# Patient Record
Sex: FEMALE | Race: WHITE | NOT HISPANIC OR LATINO
[De-identification: names, ages, dates, MRNs, and addresses within clinical notes are randomized per-mention and may not be internally consistent; named-entity substitution may affect disease eponyms.]

---

## 2017-06-06 ENCOUNTER — APPOINTMENT (OUTPATIENT)
Dept: FAMILY MEDICINE | Facility: CLINIC | Age: 82
End: 2017-06-06

## 2017-08-31 ENCOUNTER — APPOINTMENT (OUTPATIENT)
Dept: FAMILY MEDICINE | Facility: CLINIC | Age: 82
End: 2017-08-31
Payer: MEDICARE

## 2017-08-31 VITALS
SYSTOLIC BLOOD PRESSURE: 130 MMHG | HEIGHT: 64 IN | WEIGHT: 130.44 LBS | HEART RATE: 59 BPM | RESPIRATION RATE: 78 BRPM | DIASTOLIC BLOOD PRESSURE: 58 MMHG | OXYGEN SATURATION: 98 % | BODY MASS INDEX: 22.27 KG/M2

## 2017-08-31 DIAGNOSIS — G56.32 LESION OF RADIAL NERVE, LEFT UPPER LIMB: ICD-10-CM

## 2017-08-31 DIAGNOSIS — Z87.440 PERSONAL HISTORY OF URINARY (TRACT) INFECTIONS: ICD-10-CM

## 2017-08-31 PROCEDURE — 99214 OFFICE O/P EST MOD 30 MIN: CPT

## 2017-09-06 ENCOUNTER — OTHER (OUTPATIENT)
Age: 82
End: 2017-09-06

## 2017-09-07 ENCOUNTER — OTHER (OUTPATIENT)
Age: 82
End: 2017-09-07

## 2017-09-25 ENCOUNTER — OTHER (OUTPATIENT)
Age: 82
End: 2017-09-25

## 2017-09-28 ENCOUNTER — OTHER (OUTPATIENT)
Age: 82
End: 2017-09-28

## 2017-11-29 ENCOUNTER — OTHER (OUTPATIENT)
Age: 82
End: 2017-11-29

## 2017-11-30 ENCOUNTER — APPOINTMENT (OUTPATIENT)
Dept: FAMILY MEDICINE | Facility: CLINIC | Age: 82
End: 2017-11-30

## 2017-12-04 ENCOUNTER — APPOINTMENT (OUTPATIENT)
Dept: FAMILY MEDICINE | Facility: CLINIC | Age: 82
End: 2017-12-04

## 2017-12-05 ENCOUNTER — OTHER (OUTPATIENT)
Age: 82
End: 2017-12-05

## 2018-03-29 ENCOUNTER — APPOINTMENT (OUTPATIENT)
Dept: FAMILY MEDICINE | Facility: CLINIC | Age: 83
End: 2018-03-29
Payer: MEDICARE

## 2018-03-29 VITALS
HEIGHT: 64 IN | DIASTOLIC BLOOD PRESSURE: 74 MMHG | SYSTOLIC BLOOD PRESSURE: 122 MMHG | HEART RATE: 56 BPM | OXYGEN SATURATION: 91 % | RESPIRATION RATE: 12 BRPM | WEIGHT: 127.13 LBS | BODY MASS INDEX: 21.71 KG/M2

## 2018-03-29 DIAGNOSIS — M25.519 PAIN IN UNSPECIFIED SHOULDER: ICD-10-CM

## 2018-03-29 DIAGNOSIS — H18.423 BAND KERATOPATHY, BILATERAL: ICD-10-CM

## 2018-03-29 PROCEDURE — 99214 OFFICE O/P EST MOD 30 MIN: CPT

## 2018-03-29 RX ORDER — MELOXICAM 7.5 MG/1
7.5 TABLET ORAL DAILY
Qty: 14 | Refills: 0 | Status: DISCONTINUED | COMMUNITY
Start: 2017-08-31 | End: 2018-03-29

## 2018-03-31 PROBLEM — M25.519 SHOULDER PAIN: Status: ACTIVE | Noted: 2018-03-29

## 2018-07-11 ENCOUNTER — OUTPATIENT (OUTPATIENT)
Dept: OUTPATIENT SERVICES | Facility: HOSPITAL | Age: 83
LOS: 1 days | Discharge: HOME | End: 2018-07-11
Payer: MEDICARE

## 2018-07-11 DIAGNOSIS — S91.309A UNSPECIFIED OPEN WOUND, UNSPECIFIED FOOT, INITIAL ENCOUNTER: ICD-10-CM

## 2018-07-11 PROCEDURE — 87070 CULTURE OTHR SPECIMN AEROBIC: CPT

## 2018-07-11 PROCEDURE — 99204 OFFICE O/P NEW MOD 45 MIN: CPT

## 2018-07-11 PROCEDURE — 87186 SC STD MICRODIL/AGAR DIL: CPT

## 2018-07-11 PROCEDURE — G0463: CPT

## 2018-07-12 ENCOUNTER — APPOINTMENT (OUTPATIENT)
Dept: FAMILY MEDICINE | Facility: CLINIC | Age: 83
End: 2018-07-12

## 2018-07-13 ENCOUNTER — APPOINTMENT (OUTPATIENT)
Dept: FAMILY MEDICINE | Facility: CLINIC | Age: 83
End: 2018-07-13

## 2018-07-13 DIAGNOSIS — M12.9 ARTHROPATHY, UNSPECIFIED: ICD-10-CM

## 2018-07-13 DIAGNOSIS — I25.10 ATHEROSCLEROTIC HEART DISEASE OF NATIVE CORONARY ARTERY WITHOUT ANGINA PECTORIS: ICD-10-CM

## 2018-07-13 DIAGNOSIS — I83.12 VARICOSE VEINS OF LEFT LOWER EXTREMITY WITH INFLAMMATION: ICD-10-CM

## 2018-07-13 DIAGNOSIS — Z79.82 LONG TERM (CURRENT) USE OF ASPIRIN: ICD-10-CM

## 2018-07-13 DIAGNOSIS — Z96.641 PRESENCE OF RIGHT ARTIFICIAL HIP JOINT: ICD-10-CM

## 2018-07-13 DIAGNOSIS — I10 ESSENTIAL (PRIMARY) HYPERTENSION: ICD-10-CM

## 2018-07-13 DIAGNOSIS — L03.116 CELLULITIS OF LEFT LOWER LIMB: ICD-10-CM

## 2018-07-13 DIAGNOSIS — Z79.899 OTHER LONG TERM (CURRENT) DRUG THERAPY: ICD-10-CM

## 2018-07-13 DIAGNOSIS — Z95.3 PRESENCE OF XENOGENIC HEART VALVE: ICD-10-CM

## 2018-07-13 DIAGNOSIS — S81.812D LACERATION WITHOUT FOREIGN BODY, LEFT LOWER LEG, SUBSEQUENT ENCOUNTER: ICD-10-CM

## 2018-07-13 LAB
-  AMPICILLIN/SULBACTAM: SIGNIFICANT CHANGE UP
-  CEFAZOLIN: SIGNIFICANT CHANGE UP
-  CLINDAMYCIN: SIGNIFICANT CHANGE UP
-  DAPTOMYCIN: SIGNIFICANT CHANGE UP
-  ERYTHROMYCIN: SIGNIFICANT CHANGE UP
-  GENTAMICIN: SIGNIFICANT CHANGE UP
-  LINEZOLID: SIGNIFICANT CHANGE UP
-  OXACILLIN: SIGNIFICANT CHANGE UP
-  PENICILLIN: SIGNIFICANT CHANGE UP
-  RIFAMPIN: SIGNIFICANT CHANGE UP
-  TETRACYCLINE: SIGNIFICANT CHANGE UP
-  TRIMETHOPRIM/SULFAMETHOXAZOLE: SIGNIFICANT CHANGE UP
-  VANCOMYCIN: SIGNIFICANT CHANGE UP
CULTURE RESULTS: SIGNIFICANT CHANGE UP
METHOD TYPE: SIGNIFICANT CHANGE UP
ORGANISM # SPEC MICROSCOPIC CNT: SIGNIFICANT CHANGE UP
ORGANISM # SPEC MICROSCOPIC CNT: SIGNIFICANT CHANGE UP
SPECIMEN SOURCE: SIGNIFICANT CHANGE UP

## 2018-07-17 ENCOUNTER — OUTPATIENT (OUTPATIENT)
Dept: OUTPATIENT SERVICES | Facility: HOSPITAL | Age: 83
LOS: 1 days | Discharge: HOME | End: 2018-07-17
Payer: MEDICARE

## 2018-07-17 DIAGNOSIS — L03.116 CELLULITIS OF LEFT LOWER LIMB: ICD-10-CM

## 2018-07-17 PROCEDURE — 99214 OFFICE O/P EST MOD 30 MIN: CPT

## 2018-07-17 PROCEDURE — G0463: CPT

## 2018-07-19 DIAGNOSIS — Z79.899 OTHER LONG TERM (CURRENT) DRUG THERAPY: ICD-10-CM

## 2018-07-19 DIAGNOSIS — S81.812D LACERATION WITHOUT FOREIGN BODY, LEFT LOWER LEG, SUBSEQUENT ENCOUNTER: ICD-10-CM
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
## 2018-07-19 DIAGNOSIS — I10 ESSENTIAL (PRIMARY) HYPERTENSION: ICD-10-CM

## 2018-07-19 DIAGNOSIS — I83.12 VARICOSE VEINS OF LEFT LOWER EXTREMITY WITH INFLAMMATION: ICD-10-CM

## 2018-07-19 DIAGNOSIS — Z96.641 PRESENCE OF RIGHT ARTIFICIAL HIP JOINT: ICD-10-CM
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
## 2018-07-19 DIAGNOSIS — M12.9 ARTHROPATHY, UNSPECIFIED: ICD-10-CM

## 2018-07-19 DIAGNOSIS — I25.10 ATHEROSCLEROTIC HEART DISEASE OF NATIVE CORONARY ARTERY WITHOUT ANGINA PECTORIS: ICD-10-CM
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
## 2018-07-19 DIAGNOSIS — A49.02 METHICILLIN RESISTANT STAPHYLOCOCCUS AUREUS INFECTION, UNSPECIFIED SITE: ICD-10-CM

## 2018-07-19 DIAGNOSIS — Z95.3 PRESENCE OF XENOGENIC HEART VALVE: ICD-10-CM

## 2018-07-19 DIAGNOSIS — L03.116 CELLULITIS OF LEFT LOWER LIMB: ICD-10-CM

## 2018-07-19 DIAGNOSIS — Z79.82 LONG TERM (CURRENT) USE OF ASPIRIN: ICD-10-CM

## 2018-07-25 ENCOUNTER — OUTPATIENT (OUTPATIENT)
Dept: OUTPATIENT SERVICES | Facility: HOSPITAL | Age: 83
LOS: 1 days | Discharge: HOME | End: 2018-07-25
Payer: MEDICARE

## 2018-07-25 DIAGNOSIS — L03.116 CELLULITIS OF LEFT LOWER LIMB: ICD-10-CM

## 2018-07-25 PROCEDURE — G0463: CPT

## 2018-07-25 PROCEDURE — 99214 OFFICE O/P EST MOD 30 MIN: CPT
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
## 2018-07-26 DIAGNOSIS — Z79.82 LONG TERM (CURRENT) USE OF ASPIRIN: ICD-10-CM

## 2018-07-26 DIAGNOSIS — I10 ESSENTIAL (PRIMARY) HYPERTENSION: ICD-10-CM

## 2018-07-26 DIAGNOSIS — I83.12 VARICOSE VEINS OF LEFT LOWER EXTREMITY WITH INFLAMMATION: ICD-10-CM
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
## 2018-07-26 DIAGNOSIS — Z96.641 PRESENCE OF RIGHT ARTIFICIAL HIP JOINT: ICD-10-CM

## 2018-07-26 DIAGNOSIS — S81.812D LACERATION WITHOUT FOREIGN BODY, LEFT LOWER LEG, SUBSEQUENT ENCOUNTER: ICD-10-CM

## 2018-07-26 DIAGNOSIS — I25.10 ATHEROSCLEROTIC HEART DISEASE OF NATIVE CORONARY ARTERY WITHOUT ANGINA PECTORIS: ICD-10-CM

## 2018-07-26 DIAGNOSIS — Z95.3 PRESENCE OF XENOGENIC HEART VALVE: ICD-10-CM

## 2018-07-26 DIAGNOSIS — M12.9 ARTHROPATHY, UNSPECIFIED: ICD-10-CM

## 2018-07-26 DIAGNOSIS — A49.02 METHICILLIN RESISTANT STAPHYLOCOCCUS AUREUS INFECTION, UNSPECIFIED SITE: ICD-10-CM
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
## 2018-07-26 DIAGNOSIS — Z79.899 OTHER LONG TERM (CURRENT) DRUG THERAPY: ICD-10-CM
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
## 2018-07-26 DIAGNOSIS — L53.9 ERYTHEMATOUS CONDITION, UNSPECIFIED: ICD-10-CM

## 2018-08-03 ENCOUNTER — OUTPATIENT (OUTPATIENT)
Dept: OUTPATIENT SERVICES | Facility: HOSPITAL | Age: 83
LOS: 1 days | Discharge: HOME | End: 2018-08-03
Payer: MEDICARE

## 2018-08-03 DIAGNOSIS — L03.116 CELLULITIS OF LEFT LOWER LIMB: ICD-10-CM

## 2018-08-03 PROCEDURE — 99213 OFFICE O/P EST LOW 20 MIN: CPT

## 2018-08-03 PROCEDURE — G0463: CPT

## 2018-08-06 DIAGNOSIS — Z79.899 OTHER LONG TERM (CURRENT) DRUG THERAPY: ICD-10-CM

## 2018-08-06 DIAGNOSIS — Z79.82 LONG TERM (CURRENT) USE OF ASPIRIN: ICD-10-CM
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
## 2018-08-06 DIAGNOSIS — I25.10 ATHEROSCLEROTIC HEART DISEASE OF NATIVE CORONARY ARTERY WITHOUT ANGINA PECTORIS: ICD-10-CM

## 2018-08-06 DIAGNOSIS — Z96.641 PRESENCE OF RIGHT ARTIFICIAL HIP JOINT: ICD-10-CM

## 2018-08-06 DIAGNOSIS — S81.812D LACERATION WITHOUT FOREIGN BODY, LEFT LOWER LEG, SUBSEQUENT ENCOUNTER: ICD-10-CM

## 2018-08-06 DIAGNOSIS — M12.9 ARTHROPATHY, UNSPECIFIED: ICD-10-CM

## 2018-08-06 DIAGNOSIS — I83.91 ASYMPTOMATIC VARICOSE VEINS OF RIGHT LOWER EXTREMITY: ICD-10-CM
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
## 2018-08-06 DIAGNOSIS — A49.02 METHICILLIN RESISTANT STAPHYLOCOCCUS AUREUS INFECTION, UNSPECIFIED SITE: ICD-10-CM

## 2018-08-06 DIAGNOSIS — Z95.3 PRESENCE OF XENOGENIC HEART VALVE: ICD-10-CM
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
## 2018-08-06 DIAGNOSIS — I10 ESSENTIAL (PRIMARY) HYPERTENSION: ICD-10-CM

## 2018-08-06 DIAGNOSIS — I83.12 VARICOSE VEINS OF LEFT LOWER EXTREMITY WITH INFLAMMATION: ICD-10-CM

## 2018-08-10 ENCOUNTER — OUTPATIENT (OUTPATIENT)
Dept: OUTPATIENT SERVICES | Facility: HOSPITAL | Age: 83
LOS: 1 days | Discharge: HOME | End: 2018-08-10
Payer: MEDICARE

## 2018-08-10 DIAGNOSIS — S81.812D LACERATION WITHOUT FOREIGN BODY, LEFT LOWER LEG, SUBSEQUENT ENCOUNTER: ICD-10-CM

## 2018-08-10 PROCEDURE — G0463: CPT

## 2018-08-11 DIAGNOSIS — Z79.82 LONG TERM (CURRENT) USE OF ASPIRIN: ICD-10-CM

## 2018-08-11 DIAGNOSIS — I83.12 VARICOSE VEINS OF LEFT LOWER EXTREMITY WITH INFLAMMATION: ICD-10-CM

## 2018-08-11 DIAGNOSIS — I83.91 ASYMPTOMATIC VARICOSE VEINS OF RIGHT LOWER EXTREMITY: ICD-10-CM
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
## 2018-08-11 DIAGNOSIS — S81.812D LACERATION WITHOUT FOREIGN BODY, LEFT LOWER LEG, SUBSEQUENT ENCOUNTER: ICD-10-CM
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
## 2018-08-11 DIAGNOSIS — I10 ESSENTIAL (PRIMARY) HYPERTENSION: ICD-10-CM

## 2018-08-11 DIAGNOSIS — Z95.3 PRESENCE OF XENOGENIC HEART VALVE: ICD-10-CM

## 2018-08-11 DIAGNOSIS — A49.02 METHICILLIN RESISTANT STAPHYLOCOCCUS AUREUS INFECTION, UNSPECIFIED SITE: ICD-10-CM

## 2018-08-11 DIAGNOSIS — Z79.899 OTHER LONG TERM (CURRENT) DRUG THERAPY: ICD-10-CM

## 2018-08-11 DIAGNOSIS — M12.9 ARTHROPATHY, UNSPECIFIED: ICD-10-CM

## 2018-08-11 DIAGNOSIS — Z96.641 PRESENCE OF RIGHT ARTIFICIAL HIP JOINT: ICD-10-CM

## 2018-08-11 DIAGNOSIS — I25.10 ATHEROSCLEROTIC HEART DISEASE OF NATIVE CORONARY ARTERY WITHOUT ANGINA PECTORIS: ICD-10-CM
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
## 2018-08-17 ENCOUNTER — OUTPATIENT (OUTPATIENT)
Dept: OUTPATIENT SERVICES | Facility: HOSPITAL | Age: 83
LOS: 1 days | Discharge: HOME | End: 2018-08-17
Payer: MEDICARE

## 2018-08-17 DIAGNOSIS — S81.812D LACERATION WITHOUT FOREIGN BODY, LEFT LOWER LEG, SUBSEQUENT ENCOUNTER: ICD-10-CM

## 2018-08-17 PROCEDURE — G0463: CPT

## 2018-08-18 DIAGNOSIS — A49.02 METHICILLIN RESISTANT STAPHYLOCOCCUS AUREUS INFECTION, UNSPECIFIED SITE: ICD-10-CM
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
## 2018-08-18 DIAGNOSIS — I25.10 ATHEROSCLEROTIC HEART DISEASE OF NATIVE CORONARY ARTERY WITHOUT ANGINA PECTORIS: ICD-10-CM

## 2018-08-18 DIAGNOSIS — I83.91 ASYMPTOMATIC VARICOSE VEINS OF RIGHT LOWER EXTREMITY: ICD-10-CM

## 2018-08-18 DIAGNOSIS — S81.812D LACERATION WITHOUT FOREIGN BODY, LEFT LOWER LEG, SUBSEQUENT ENCOUNTER: ICD-10-CM

## 2018-08-18 DIAGNOSIS — Z79.899 OTHER LONG TERM (CURRENT) DRUG THERAPY: ICD-10-CM

## 2018-08-18 DIAGNOSIS — Z79.82 LONG TERM (CURRENT) USE OF ASPIRIN: ICD-10-CM

## 2018-08-18 DIAGNOSIS — I10 ESSENTIAL (PRIMARY) HYPERTENSION: ICD-10-CM
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
## 2018-08-18 DIAGNOSIS — I83.12 VARICOSE VEINS OF LEFT LOWER EXTREMITY WITH INFLAMMATION: ICD-10-CM

## 2018-08-18 DIAGNOSIS — Z95.3 PRESENCE OF XENOGENIC HEART VALVE: ICD-10-CM

## 2018-08-18 DIAGNOSIS — Z96.641 PRESENCE OF RIGHT ARTIFICIAL HIP JOINT: ICD-10-CM

## 2018-08-18 DIAGNOSIS — M12.9 ARTHROPATHY, UNSPECIFIED: ICD-10-CM

## 2018-08-28 ENCOUNTER — OUTPATIENT (OUTPATIENT)
Dept: OUTPATIENT SERVICES | Facility: HOSPITAL | Age: 83
LOS: 1 days | Discharge: HOME | End: 2018-08-28
Payer: MEDICARE

## 2018-08-28 DIAGNOSIS — S81.812D LACERATION WITHOUT FOREIGN BODY, LEFT LOWER LEG, SUBSEQUENT ENCOUNTER: ICD-10-CM

## 2018-08-28 PROCEDURE — 97602 WOUND(S) CARE NON-SELECTIVE: CPT

## 2018-08-30 DIAGNOSIS — M12.9 ARTHROPATHY, UNSPECIFIED: ICD-10-CM
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
## 2018-08-30 DIAGNOSIS — I83.91 ASYMPTOMATIC VARICOSE VEINS OF RIGHT LOWER EXTREMITY: ICD-10-CM

## 2018-08-30 DIAGNOSIS — Z95.3 PRESENCE OF XENOGENIC HEART VALVE: ICD-10-CM
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
## 2018-08-30 DIAGNOSIS — I83.12 VARICOSE VEINS OF LEFT LOWER EXTREMITY WITH INFLAMMATION: ICD-10-CM

## 2018-08-30 DIAGNOSIS — I10 ESSENTIAL (PRIMARY) HYPERTENSION: ICD-10-CM

## 2018-08-30 DIAGNOSIS — Z96.641 PRESENCE OF RIGHT ARTIFICIAL HIP JOINT: ICD-10-CM

## 2018-08-30 DIAGNOSIS — Z79.82 LONG TERM (CURRENT) USE OF ASPIRIN: ICD-10-CM
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
## 2018-08-30 DIAGNOSIS — Z79.899 OTHER LONG TERM (CURRENT) DRUG THERAPY: ICD-10-CM

## 2018-08-30 DIAGNOSIS — I25.10 ATHEROSCLEROTIC HEART DISEASE OF NATIVE CORONARY ARTERY WITHOUT ANGINA PECTORIS: ICD-10-CM

## 2018-08-30 DIAGNOSIS — S81.812D LACERATION WITHOUT FOREIGN BODY, LEFT LOWER LEG, SUBSEQUENT ENCOUNTER: ICD-10-CM

## 2018-09-04 ENCOUNTER — OUTPATIENT (OUTPATIENT)
Dept: OUTPATIENT SERVICES | Facility: HOSPITAL | Age: 83
LOS: 1 days | Discharge: HOME | End: 2018-09-04
Payer: MEDICARE

## 2018-09-04 DIAGNOSIS — S81.812D LACERATION WITHOUT FOREIGN BODY, LEFT LOWER LEG, SUBSEQUENT ENCOUNTER: ICD-10-CM

## 2018-09-04 PROCEDURE — 97602 WOUND(S) CARE NON-SELECTIVE: CPT

## 2018-09-06 DIAGNOSIS — M12.9 ARTHROPATHY, UNSPECIFIED: ICD-10-CM

## 2018-09-06 DIAGNOSIS — I25.10 ATHEROSCLEROTIC HEART DISEASE OF NATIVE CORONARY ARTERY WITHOUT ANGINA PECTORIS: ICD-10-CM
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
## 2018-09-06 DIAGNOSIS — Z79.899 OTHER LONG TERM (CURRENT) DRUG THERAPY: ICD-10-CM

## 2018-09-06 DIAGNOSIS — I83.91 ASYMPTOMATIC VARICOSE VEINS OF RIGHT LOWER EXTREMITY: ICD-10-CM

## 2018-09-06 DIAGNOSIS — I10 ESSENTIAL (PRIMARY) HYPERTENSION: ICD-10-CM
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
## 2018-09-06 DIAGNOSIS — Z96.641 PRESENCE OF RIGHT ARTIFICIAL HIP JOINT: ICD-10-CM

## 2018-09-06 DIAGNOSIS — Z79.82 LONG TERM (CURRENT) USE OF ASPIRIN: ICD-10-CM
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
## 2018-09-06 DIAGNOSIS — I83.12 VARICOSE VEINS OF LEFT LOWER EXTREMITY WITH INFLAMMATION: ICD-10-CM

## 2018-09-06 DIAGNOSIS — Z95.3 PRESENCE OF XENOGENIC HEART VALVE: ICD-10-CM

## 2018-09-06 DIAGNOSIS — S81.812D LACERATION WITHOUT FOREIGN BODY, LEFT LOWER LEG, SUBSEQUENT ENCOUNTER: ICD-10-CM

## 2018-09-10 ENCOUNTER — OTHER (OUTPATIENT)
Age: 83
End: 2018-09-10

## 2018-09-18 ENCOUNTER — OUTPATIENT (OUTPATIENT)
Dept: OUTPATIENT SERVICES | Facility: HOSPITAL | Age: 83
LOS: 1 days | Discharge: HOME | End: 2018-09-18
Payer: MEDICARE

## 2018-09-18 ENCOUNTER — APPOINTMENT (OUTPATIENT)
Dept: FAMILY MEDICINE | Facility: CLINIC | Age: 83
End: 2018-09-18

## 2018-09-18 DIAGNOSIS — S81.812D LACERATION WITHOUT FOREIGN BODY, LEFT LOWER LEG, SUBSEQUENT ENCOUNTER: ICD-10-CM

## 2018-09-18 PROCEDURE — 97602 WOUND(S) CARE NON-SELECTIVE: CPT
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
## 2018-09-19 DIAGNOSIS — S81.812D LACERATION WITHOUT FOREIGN BODY, LEFT LOWER LEG, SUBSEQUENT ENCOUNTER: ICD-10-CM

## 2018-09-19 DIAGNOSIS — I83.91 ASYMPTOMATIC VARICOSE VEINS OF RIGHT LOWER EXTREMITY: ICD-10-CM

## 2018-09-19 DIAGNOSIS — I25.10 ATHEROSCLEROTIC HEART DISEASE OF NATIVE CORONARY ARTERY WITHOUT ANGINA PECTORIS: ICD-10-CM
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
## 2018-09-19 DIAGNOSIS — Z79.899 OTHER LONG TERM (CURRENT) DRUG THERAPY: ICD-10-CM

## 2018-09-19 DIAGNOSIS — I83.12 VARICOSE VEINS OF LEFT LOWER EXTREMITY WITH INFLAMMATION: ICD-10-CM
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
## 2018-09-19 DIAGNOSIS — Z96.641 PRESENCE OF RIGHT ARTIFICIAL HIP JOINT: ICD-10-CM

## 2018-09-19 DIAGNOSIS — I10 ESSENTIAL (PRIMARY) HYPERTENSION: ICD-10-CM

## 2018-09-19 DIAGNOSIS — M12.9 ARTHROPATHY, UNSPECIFIED: ICD-10-CM
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
## 2018-09-19 DIAGNOSIS — Z95.3 PRESENCE OF XENOGENIC HEART VALVE: ICD-10-CM

## 2018-09-19 DIAGNOSIS — Z79.82 LONG TERM (CURRENT) USE OF ASPIRIN: ICD-10-CM

## 2018-09-25 ENCOUNTER — OUTPATIENT (OUTPATIENT)
Dept: OUTPATIENT SERVICES | Facility: HOSPITAL | Age: 83
LOS: 1 days | Discharge: HOME | End: 2018-09-25
Payer: MEDICARE

## 2018-09-25 DIAGNOSIS — S81.812D LACERATION WITHOUT FOREIGN BODY, LEFT LOWER LEG, SUBSEQUENT ENCOUNTER: ICD-10-CM

## 2018-09-25 PROCEDURE — G0463: CPT
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
## 2018-09-26 DIAGNOSIS — I25.10 ATHEROSCLEROTIC HEART DISEASE OF NATIVE CORONARY ARTERY WITHOUT ANGINA PECTORIS: ICD-10-CM
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
## 2018-09-26 DIAGNOSIS — I83.12 VARICOSE VEINS OF LEFT LOWER EXTREMITY WITH INFLAMMATION: ICD-10-CM
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
## 2018-09-26 DIAGNOSIS — M12.9 ARTHROPATHY, UNSPECIFIED: ICD-10-CM

## 2018-09-26 DIAGNOSIS — I83.91 ASYMPTOMATIC VARICOSE VEINS OF RIGHT LOWER EXTREMITY: ICD-10-CM

## 2018-09-26 DIAGNOSIS — Z79.899 OTHER LONG TERM (CURRENT) DRUG THERAPY: ICD-10-CM

## 2018-09-26 DIAGNOSIS — I10 ESSENTIAL (PRIMARY) HYPERTENSION: ICD-10-CM

## 2018-09-26 DIAGNOSIS — Z95.3 PRESENCE OF XENOGENIC HEART VALVE: ICD-10-CM

## 2018-09-26 DIAGNOSIS — Z79.82 LONG TERM (CURRENT) USE OF ASPIRIN: ICD-10-CM

## 2018-09-26 DIAGNOSIS — Z96.641 PRESENCE OF RIGHT ARTIFICIAL HIP JOINT: ICD-10-CM

## 2018-09-26 DIAGNOSIS — S81.812D LACERATION WITHOUT FOREIGN BODY, LEFT LOWER LEG, SUBSEQUENT ENCOUNTER: ICD-10-CM

## 2018-10-02 ENCOUNTER — APPOINTMENT (OUTPATIENT)
Dept: FAMILY MEDICINE | Facility: CLINIC | Age: 83
End: 2018-10-02
Payer: MEDICARE

## 2018-10-02 ENCOUNTER — OUTPATIENT (OUTPATIENT)
Dept: OUTPATIENT SERVICES | Facility: HOSPITAL | Age: 83
LOS: 1 days | Discharge: HOME | End: 2018-10-02
Payer: MEDICARE

## 2018-10-02 ENCOUNTER — OTHER (OUTPATIENT)
Age: 83
End: 2018-10-02

## 2018-10-02 VITALS
HEIGHT: 64 IN | SYSTOLIC BLOOD PRESSURE: 199 MMHG | BODY MASS INDEX: 20.14 KG/M2 | DIASTOLIC BLOOD PRESSURE: 62 MMHG | OXYGEN SATURATION: 96 % | WEIGHT: 118 LBS | HEART RATE: 53 BPM | RESPIRATION RATE: 12 BRPM

## 2018-10-02 DIAGNOSIS — D64.9 ANEMIA, UNSPECIFIED: ICD-10-CM

## 2018-10-02 DIAGNOSIS — Z00.00 ENCOUNTER FOR GENERAL ADULT MEDICAL EXAMINATION W/OUT ABNORMAL FINDINGS: ICD-10-CM

## 2018-10-02 DIAGNOSIS — E55.9 VITAMIN D DEFICIENCY, UNSPECIFIED: ICD-10-CM

## 2018-10-02 DIAGNOSIS — Z23 ENCOUNTER FOR IMMUNIZATION: ICD-10-CM

## 2018-10-02 DIAGNOSIS — S81.812D LACERATION WITHOUT FOREIGN BODY, LEFT LOWER LEG, SUBSEQUENT ENCOUNTER: ICD-10-CM

## 2018-10-02 PROCEDURE — G0439: CPT

## 2018-10-02 PROCEDURE — 90686 IIV4 VACC NO PRSV 0.5 ML IM: CPT

## 2018-10-02 PROCEDURE — G0463: CPT

## 2018-10-02 PROCEDURE — G0008: CPT

## 2018-10-02 RX ORDER — CEPHALEXIN 500 MG/1
500 CAPSULE ORAL
Qty: 14 | Refills: 1 | Status: DISCONTINUED | COMMUNITY
Start: 2017-09-07 | End: 2018-10-02

## 2018-10-02 NOTE — PHYSICAL EXAM
[No Acute Distress] : no acute distress [Well Nourished] : well nourished [Well Developed] : well developed [EOMI] : extraocular movements intact [No Respiratory Distress] : no respiratory distress  [Clear to Auscultation] : lungs were clear to auscultation bilaterally [No Accessory Muscle Use] : no accessory muscle use [No Edema] : there was no peripheral edema [No Joint Swelling] : no joint swelling

## 2018-10-03 DIAGNOSIS — S81.812D LACERATION WITHOUT FOREIGN BODY, LEFT LOWER LEG, SUBSEQUENT ENCOUNTER: ICD-10-CM
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
## 2018-10-03 DIAGNOSIS — Z79.82 LONG TERM (CURRENT) USE OF ASPIRIN: ICD-10-CM

## 2018-10-03 DIAGNOSIS — M12.9 ARTHROPATHY, UNSPECIFIED: ICD-10-CM
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
## 2018-10-03 DIAGNOSIS — I10 ESSENTIAL (PRIMARY) HYPERTENSION: ICD-10-CM

## 2018-10-03 DIAGNOSIS — I25.10 ATHEROSCLEROTIC HEART DISEASE OF NATIVE CORONARY ARTERY WITHOUT ANGINA PECTORIS: ICD-10-CM
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
## 2018-10-03 DIAGNOSIS — Z96.641 PRESENCE OF RIGHT ARTIFICIAL HIP JOINT: ICD-10-CM

## 2018-10-03 DIAGNOSIS — Z79.899 OTHER LONG TERM (CURRENT) DRUG THERAPY: ICD-10-CM

## 2018-10-03 DIAGNOSIS — I83.12 VARICOSE VEINS OF LEFT LOWER EXTREMITY WITH INFLAMMATION: ICD-10-CM

## 2018-10-03 DIAGNOSIS — Z95.3 PRESENCE OF XENOGENIC HEART VALVE: ICD-10-CM

## 2018-10-03 DIAGNOSIS — I83.91 ASYMPTOMATIC VARICOSE VEINS OF RIGHT LOWER EXTREMITY: ICD-10-CM

## 2018-10-04 ENCOUNTER — MED ADMIN CHARGE (OUTPATIENT)
Age: 83
End: 2018-10-04

## 2018-10-05 ENCOUNTER — INPATIENT (INPATIENT)
Facility: HOSPITAL | Age: 83
LOS: 12 days | Discharge: NURSING, REHAB OR LONG-TERM CARE | DRG: 64 | End: 2018-10-18
Attending: STUDENT IN AN ORGANIZED HEALTH CARE EDUCATION/TRAINING PROGRAM | Admitting: INTERNAL MEDICINE
Payer: MEDICARE

## 2018-10-05 VITALS
HEART RATE: 66 BPM | SYSTOLIC BLOOD PRESSURE: 212 MMHG | RESPIRATION RATE: 14 BRPM | TEMPERATURE: 97 F | DIASTOLIC BLOOD PRESSURE: 59 MMHG

## 2018-10-05 DIAGNOSIS — I63.9 CEREBRAL INFARCTION, UNSPECIFIED: ICD-10-CM

## 2018-10-05 DIAGNOSIS — I10 ESSENTIAL (PRIMARY) HYPERTENSION: ICD-10-CM

## 2018-10-05 DIAGNOSIS — Z96.641 PRESENCE OF RIGHT ARTIFICIAL HIP JOINT: Chronic | ICD-10-CM

## 2018-10-05 DIAGNOSIS — Z95.2 PRESENCE OF PROSTHETIC HEART VALVE: Chronic | ICD-10-CM

## 2018-10-05 DIAGNOSIS — R29.898 OTHER SYMPTOMS AND SIGNS INVOLVING THE MUSCULOSKELETAL SYSTEM: ICD-10-CM

## 2018-10-05 DIAGNOSIS — Z29.9 ENCOUNTER FOR PROPHYLACTIC MEASURES, UNSPECIFIED: ICD-10-CM

## 2018-10-05 DIAGNOSIS — Z98.890 OTHER SPECIFIED POSTPROCEDURAL STATES: Chronic | ICD-10-CM

## 2018-10-05 LAB
ALBUMIN SERPL ELPH-MCNC: 3.9 G/DL — SIGNIFICANT CHANGE UP (ref 3.3–5)
ALP SERPL-CCNC: 50 U/L — SIGNIFICANT CHANGE UP (ref 40–120)
ALT FLD-CCNC: 11 U/L DA — SIGNIFICANT CHANGE UP (ref 10–45)
ANION GAP SERPL CALC-SCNC: 10 MMOL/L — SIGNIFICANT CHANGE UP (ref 5–17)
APTT BLD: 23 SEC — LOW (ref 27.5–37.4)
AST SERPL-CCNC: 24 U/L — SIGNIFICANT CHANGE UP (ref 10–40)
BILIRUB SERPL-MCNC: 0.4 MG/DL — SIGNIFICANT CHANGE UP (ref 0.2–1.2)
BUN SERPL-MCNC: 43 MG/DL — HIGH (ref 7–23)
CALCIUM SERPL-MCNC: 10.1 MG/DL — SIGNIFICANT CHANGE UP (ref 8.4–10.5)
CHLORIDE SERPL-SCNC: 100 MMOL/L — SIGNIFICANT CHANGE UP (ref 96–108)
CO2 SERPL-SCNC: 26 MMOL/L — SIGNIFICANT CHANGE UP (ref 22–31)
CREAT SERPL-MCNC: 1.6 MG/DL — HIGH (ref 0.5–1.3)
GIANT PLATELETS BLD QL SMEAR: PRESENT — SIGNIFICANT CHANGE UP
GLUCOSE SERPL-MCNC: 123 MG/DL — HIGH (ref 70–99)
HCT VFR BLD CALC: 39 % — SIGNIFICANT CHANGE UP (ref 34.5–45)
HGB BLD-MCNC: 12.5 G/DL — SIGNIFICANT CHANGE UP (ref 11.5–15.5)
INR BLD: 1 RATIO — SIGNIFICANT CHANGE UP (ref 0.88–1.16)
LYMPHOCYTES # BLD AUTO: 7 % — LOW (ref 13–44)
MACROCYTES BLD QL: SLIGHT — SIGNIFICANT CHANGE UP
MCHC RBC-ENTMCNC: 32.1 GM/DL — SIGNIFICANT CHANGE UP (ref 32–36)
MCHC RBC-ENTMCNC: 32.4 PG — SIGNIFICANT CHANGE UP (ref 27–34)
MCV RBC AUTO: 101 FL — HIGH (ref 80–100)
MONOCYTES NFR BLD AUTO: 3 % — SIGNIFICANT CHANGE UP (ref 2–14)
NEUTROPHILS NFR BLD AUTO: 90 % — HIGH (ref 43–77)
PLAT MORPH BLD: NORMAL — SIGNIFICANT CHANGE UP
PLATELET # BLD AUTO: 193 K/UL — SIGNIFICANT CHANGE UP (ref 150–400)
POTASSIUM SERPL-MCNC: 3.8 MMOL/L — SIGNIFICANT CHANGE UP (ref 3.5–5.3)
POTASSIUM SERPL-SCNC: 3.8 MMOL/L — SIGNIFICANT CHANGE UP (ref 3.5–5.3)
PROT SERPL-MCNC: 9 G/DL — HIGH (ref 6–8.3)
PROTHROM AB SERPL-ACNC: 11.1 SEC — SIGNIFICANT CHANGE UP (ref 9.8–12.7)
RBC # BLD: 3.87 M/UL — SIGNIFICANT CHANGE UP (ref 3.8–5.2)
RBC # FLD: 12.5 % — SIGNIFICANT CHANGE UP (ref 10.3–14.5)
RBC BLD AUTO: ABNORMAL
SODIUM SERPL-SCNC: 136 MMOL/L — SIGNIFICANT CHANGE UP (ref 135–145)
WBC # BLD: 8.2 K/UL — SIGNIFICANT CHANGE UP (ref 3.8–10.5)
WBC # FLD AUTO: 8.2 K/UL — SIGNIFICANT CHANGE UP (ref 3.8–10.5)

## 2018-10-05 PROCEDURE — 70450 CT HEAD/BRAIN W/O DYE: CPT | Mod: 26

## 2018-10-05 PROCEDURE — 99291 CRITICAL CARE FIRST HOUR: CPT

## 2018-10-05 PROCEDURE — 71045 X-RAY EXAM CHEST 1 VIEW: CPT | Mod: 26

## 2018-10-05 PROCEDURE — 93010 ELECTROCARDIOGRAM REPORT: CPT

## 2018-10-05 RX ORDER — SIMVASTATIN 20 MG/1
40 TABLET, FILM COATED ORAL AT BEDTIME
Qty: 0 | Refills: 0 | Status: DISCONTINUED | OUTPATIENT
Start: 2018-10-05 | End: 2018-10-05

## 2018-10-05 RX ORDER — PANTOPRAZOLE SODIUM 20 MG/1
40 TABLET, DELAYED RELEASE ORAL DAILY
Qty: 0 | Refills: 0 | Status: DISCONTINUED | OUTPATIENT
Start: 2018-10-05 | End: 2018-10-18

## 2018-10-05 RX ORDER — INFLUENZA VIRUS VACCINE 15; 15; 15; 15 UG/.5ML; UG/.5ML; UG/.5ML; UG/.5ML
0.5 SUSPENSION INTRAMUSCULAR ONCE
Qty: 0 | Refills: 0 | Status: DISCONTINUED | OUTPATIENT
Start: 2018-10-05 | End: 2018-10-10

## 2018-10-05 RX ORDER — ATORVASTATIN CALCIUM 80 MG/1
20 TABLET, FILM COATED ORAL AT BEDTIME
Qty: 0 | Refills: 0 | Status: DISCONTINUED | OUTPATIENT
Start: 2018-10-05 | End: 2018-10-06

## 2018-10-05 RX ORDER — LOSARTAN POTASSIUM 100 MG/1
50 TABLET, FILM COATED ORAL EVERY 12 HOURS
Qty: 0 | Refills: 0 | Status: DISCONTINUED | OUTPATIENT
Start: 2018-10-05 | End: 2018-10-06

## 2018-10-05 RX ORDER — HEPARIN SODIUM 5000 [USP'U]/ML
5000 INJECTION INTRAVENOUS; SUBCUTANEOUS EVERY 12 HOURS
Qty: 0 | Refills: 0 | Status: DISCONTINUED | OUTPATIENT
Start: 2018-10-05 | End: 2018-10-18

## 2018-10-05 RX ORDER — NICARDIPINE HYDROCHLORIDE 30 MG/1
5 CAPSULE, EXTENDED RELEASE ORAL
Qty: 40 | Refills: 0 | Status: DISCONTINUED | OUTPATIENT
Start: 2018-10-05 | End: 2018-10-06

## 2018-10-05 RX ORDER — ASPIRIN/CALCIUM CARB/MAGNESIUM 324 MG
81 TABLET ORAL DAILY
Qty: 0 | Refills: 0 | Status: DISCONTINUED | OUTPATIENT
Start: 2018-10-05 | End: 2018-10-18

## 2018-10-05 RX ORDER — METOPROLOL TARTRATE 50 MG
50 TABLET ORAL EVERY 12 HOURS
Qty: 0 | Refills: 0 | Status: DISCONTINUED | OUTPATIENT
Start: 2018-10-05 | End: 2018-10-06

## 2018-10-05 RX ORDER — AMLODIPINE BESYLATE 2.5 MG/1
10 TABLET ORAL ONCE
Qty: 0 | Refills: 0 | Status: COMPLETED | OUTPATIENT
Start: 2018-10-05 | End: 2018-10-05

## 2018-10-05 RX ADMIN — Medication 1 PATCH: at 15:57

## 2018-10-05 RX ADMIN — Medication 50 MILLIGRAM(S): at 17:16

## 2018-10-05 RX ADMIN — LOSARTAN POTASSIUM 50 MILLIGRAM(S): 100 TABLET, FILM COATED ORAL at 17:12

## 2018-10-05 RX ADMIN — HEPARIN SODIUM 5000 UNIT(S): 5000 INJECTION INTRAVENOUS; SUBCUTANEOUS at 17:12

## 2018-10-05 RX ADMIN — NICARDIPINE HYDROCHLORIDE 25 MG/HR: 30 CAPSULE, EXTENDED RELEASE ORAL at 15:48

## 2018-10-05 RX ADMIN — Medication 81 MILLIGRAM(S): at 15:55

## 2018-10-05 RX ADMIN — PANTOPRAZOLE SODIUM 40 MILLIGRAM(S): 20 TABLET, DELAYED RELEASE ORAL at 17:13

## 2018-10-05 RX ADMIN — ATORVASTATIN CALCIUM 20 MILLIGRAM(S): 80 TABLET, FILM COATED ORAL at 21:30

## 2018-10-05 RX ADMIN — AMLODIPINE BESYLATE 10 MILLIGRAM(S): 2.5 TABLET ORAL at 13:27

## 2018-10-05 NOTE — H&P ADULT - ATTENDING COMMENTS
pt seen and examined in ED  pt alert, responsive  case d/w Dr. Ibanez who we evaluated pt bedside together  noted Left sided weakness and left hemineglect  as per family HTN chronic and poorly controlled    Assessment  Acute CVA with Left HP x  1 day, not a candidate for acute tpa  uncontrolled bp, elevated BP  VHD s/p AVR    Plan   admit to ICU  permissive HTN goal sbp <180  asa, statin  neuro f/u  echo  us carotids  supportive care

## 2018-10-05 NOTE — H&P ADULT - PROBLEM SELECTOR PLAN 1
Suspected acute R MCA CVA.  Maintain -180, will check HA1c, lipid profile.  For echocardiogram and carotid duplex, ASA and statin therapies.  Repeat CT head in AM.  PT/OT.

## 2018-10-05 NOTE — H&P ADULT - PSH
History of total hip replacement, right  2014 at [facility],  No complications  S/P AVR  2008 at [facility], Dr. Bermudez.  No complications  S/p bilateral carotid endarterectomy  2008 st [facility], no complications

## 2018-10-05 NOTE — H&P ADULT - NSHPREVIEWOFSYSTEMS_GEN_ALL_CORE
Review of Systems:   	CONSTITUTIONAL: Denies fever, weight loss, or fatigue, change in appetite   	EYES: Denies photophobia, visual disturbances  	ENT:  Denies difficulty hearing, tinnitus, vertigo  	NECK: Denies neck , neck stiffness  	RESPIRATORY: Denies cough, wheeze, hemoptysis, shortness of breath  	CARDIOVASCULAR: Denies chest pain, palpitations, irregular HR  	GASTROINTESTINAL:  Denies nausea, vomiting, abdominal pain, diarrhea, constipation, melena, BRBPR, food intolerances  	GENITOURINARY: Denies dysuria, frequency, hematuria, and incontinence  	NEUROLOGICAL: See HPI  	SKIN: Denies rashes, masses, bruising, lesions   	ENDOCRINE: Denies heat or cold intolerance; No hair loss  	MUSCULOSKELETAL: Denies history of OA or gout, joint swelling  	HEME: Denies history of DVT/PE, blood transfusion

## 2018-10-05 NOTE — H&P ADULT - NSHPLABSRESULTS_GEN_ALL_CORE
EXAM:  CT BRAIN STROKE PROTOCOL    PROCEDURE DATE:  10/05/2018    INTERPRETATION:  CT brain without contrast  History facial droop and slurred speech.  There are no prior studies for comparison  There is no acute intracranial hemorrhage, mass effect, cortical edema or   hydrocephalus. The [place] vessels are symmetric in attenuation.   There are small chronic right parietal and left cerebellar infarcts.   There is no skull fracture.  IMPRESSION:  No acute findings  JULIUS KRAFT M.D., ATTENDING RADIOLOGIST  This document has been electronically signed. Oct  5 2018 10:55AM    On review of images with neurology, there seems to be an acute infarct in the right MCA parietal territory

## 2018-10-05 NOTE — H&P ADULT - HISTORY OF PRESENT ILLNESS
89 year old female with PMH HTN and AVR presented to ED at [facility] today via EMS.  Per , patient developed left arm weakness and left facial droop at about 9am yesterday, at that time they did not seek medical intervention.  This morning patient was unsteady in bathroom and  assisted her to the floor.  Called EMS to help with transfer out of bathroom, noted to be hypertensive by EMS and brought to ED against husbands wishes.  In ED SBP's found to be greater than 200.  Denies fever, chills, chest pain, shortness of breath, abdominal pain, lightheadedness, dizziness, headache, visual changes, tremor, syncope.

## 2018-10-05 NOTE — ED PROVIDER NOTE - PROGRESS NOTE DETAILS
AWILDA Gonzalez- CT head negative for acute stroke- will admit for further neuro work up- accepted by Dr. Hampton

## 2018-10-05 NOTE — HEALTH RISK ASSESSMENT
[Good] : ~his/her~  mood as  good [None] : None [With Significant Other] : lives with significant other [] :  [Fully functional (bathing, dressing, toileting, transferring, walking, feeding)] : Fully functional (bathing, dressing, toileting, transferring, walking, feeding) [Fully functional (using the telephone, shopping, preparing meals, housekeeping, doing laundry, using] : Fully functional and needs no help or supervision to perform IADLs (using the telephone, shopping, preparing meals, housekeeping, doing laundry, using transportation, managing medications and managing finances) [Designated Healthcare Proxy] : Designated healthcare proxy [No falls in past year] : Patient reported no falls in the past year [0] : 2) Feeling down, depressed, or hopeless: Not at all (0) [Change in mental status noted] : No change in mental status noted [Language] : denies difficulty with language [Behavior] : difficulty with behavior [Learning/Retaining New Information] : denies difficulty learning/retaining new information [Handling Complex Tasks] : denies difficulty handling complex tasks [Reasoning] : denies difficulty with reasoning [Spatial Ability and Orientation] : denies difficulty with spatial ability and orientation [Sexually Active] : not sexually active [Reports changes in hearing] : Reports no changes in hearing [Reports changes in dental health] : Reports no changes in dental health [Smoke Detector] : no smoke detector [Carbon Monoxide Detector] : no carbon monoxide detector [Safety elements used in home] : no safety elements used in home [Seat Belt] : does not use seat belt [Sunscreen] : does not use sunscreen [Travel to Developing Areas] : does not  travel to developing areas [TB Exposure] : is not being exposed to tuberculosis [Caregiver Concerns] : does not have caregiver concerns [de-identified] : daughter one mile away helps her out [FreeTextEntry4] : discussed living will copy of molts available here\par daughter states her mother does have an advanced directive

## 2018-10-05 NOTE — H&P ADULT - NSHPSOCIALHISTORY_GEN_ALL_CORE
Lives in private home with  who is a retired dentist  One glass of tomato juice and vodka daily, 1 beer once or twice weekly  Denies history of ETOH use or abuse  Denies history of substance abuse

## 2018-10-05 NOTE — H&P ADULT - PROBLEM SELECTOR PLAN 2
Uncontrolled with SBP > 200.  Will start Clonidine TD with home antihypertensives, may require Cardene infusion for BP control.  Restart home antihypertensives with SBP goal 140-180

## 2018-10-05 NOTE — ED ADULT NURSE NOTE - OBJECTIVE STATEMENT
Pt presents as stroke code, pt is A&O to person and place.   States slipped and fell this morning and could not get up, as per  pt did not fall,  states that he lower wife slowly to the ground because she felt week.  states that left arm numbness to hand and fingers started yesterday, states pt's PCP was contacted yesterday.   Lives with , who called EMS today because could not help pt off the floor.   Pt with wound to left lower extremity, states from previous fall and is currently being treated by wound specialist, dressing in place, dry-normal appearing.  Pt states does not ambulate regularly due to weakness and has experienced frequent falls.   No obvious signs of injury noted, no deformity, skin intact.  Hx. 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

## 2018-10-05 NOTE — ED PROVIDER NOTE - OBJECTIVE STATEMENT
89 y/o F with h/o HTN presenting with left arm weakness and left facial droop since 9am yesterday. Lives at home. Denies blood thinner use. Denies fever, chills, chest pain, shortness of breath, abdominal pain.  states she fell on bathroom floor this am. Called EMS just to have her picked up off the floor not to be evaluated for stroke. 89 y/o F with h/o HTN presenting with left arm weakness and left facial droop since 9am yesterday. Lives at home with . Denies blood thinner use. Denies fever, chills, chest pain, shortness of breath, abdominal pain.  via phone states she fell on bathroom floor this am- no head trauma, no LOC. Called EMS just to have her picked up off the floor NOT to be evaluated for stroke.   upon arrival

## 2018-10-05 NOTE — ED PROVIDER NOTE - MEDICAL DECISION MAKING DETAILS
89 y/o F with h/o HTN presenting s/p fall this am with left sided facial droop, left arm weakness and slurred speech since yesterday 9am- Stroke protocol- CT scan head, labs, Admit for further neuro work up

## 2018-10-05 NOTE — ED ADULT NURSE REASSESSMENT NOTE - NS ED NURSE REASSESS COMMENT FT1
Patient pending admission, Tele admit on hold, consult from ICU due to HTN.   Will continue to monitor further, MD Escalante aware of pt's BP.

## 2018-10-05 NOTE — H&P ADULT - NSHPPHYSICALEXAM_GEN_ALL_CORE
T(F): 96.8 (05 Oct 2018 10:56), Max: 96.8 (05 Oct 2018 10:56)  HR: 67 (05 Oct 2018 13:25) (60 - 67)  BP: 192/61 (05 Oct 2018 13:25) (191/72 - 212/59)  RR: 16 (05 Oct 2018 13:25) (14 - 17)  SpO2: 97% RA (05 Oct 2018 13:25) (95% - 97%)    Physical Exam  Gen:  WN/WD Female resting in bed, NAD  ENT:  NC/AT, no JVD noted  Thorax:  Symmetric, no retractions  Lung:  CTA b/l  CV:  S1, S2. RRR  Abd:  Soft, NT/ND.  BS normoactive, no masses to palp  Extrem:  No C/C/E, DP/radial pulses +2  Neuro:  A&O x 3.  Left facial droop noted with left tounge deviation.  Left visual field hemianopsia. LUE strength 2/5, LLE 4/5.

## 2018-10-05 NOTE — H&P ADULT - ASSESSMENT
89 year old female with PMH HTN and AVR, developed right sided weakness yesterday and brought to ED via EMS after  unable to transfer patient out of bathroom, found to have profound systolic hypertension, suspected R MCA CVA.

## 2018-10-05 NOTE — PATIENT PROFILE ADULT. - PSH
Redundant Refill Request for Lipitor refused;    Medication Detail      Disp Refills Start End SYMONE   atorvastatin (LIPITOR) 40 MG tablet 90 tablet 4 5/11/2018  No   Sig - Route: Take 1 tablet (40 mg) by mouth At Bedtime - Oral   Class: E-Prescribe   Order: [id number]   E-Prescribing Status: Receipt confirmed by pharmacy (5/11/2018  2:21 PM CDT)   Printout Tracking   External Result Report   Pharmacy   [id number] - [place], [place] - [address]     Writer notes that this Rx request from the pharmacy was already responded to on 8/1/18 with 7/30/18 refill encounter. Additional refill request has been received at this time. Call placed to [facility] in [place]. Spoke to diane Salas. Advised him of refill encounter on 7/30/18 as well as dose change with new Rx as noted above on 5/11/18. Pharmacist states understanding. Did receive new Rx as noted above on 5/11/18. Will refill Rx as noted above at this time. Nothing further needed per this writer at this time. Writer will refuse Rx request in this encounter.    Unable to complete prescription refill per RN Medication Refill Policy. Armando Gunn 8/7/2018 4:40 PM   History of total hip replacement, right  2014 at [facility],  No complications  S/P AVR  2008 at [facility], Dr. Bermudez.  No complications  S/p bilateral carotid endarterectomy  2008 st [facility], no complications

## 2018-10-05 NOTE — ED PROVIDER NOTE - ATTENDING CONTRIBUTION TO CARE
pt with onset of L sided weakness slurred speech and facial droop 26 hours PTA.  EMS only brought to hospital since  could not get her back into bed.  Pt denies any pain at this time.    Gen: Well appearing in NAD  Head: NC/AT  Neck: trachea midline  Resp:  No distress  CV: RRR  Ext: no deformities  Neuro:  A&O There is L facial droop, LUE flacid paralysis and slurred speech  Skin:  Warm and dry as visualized    Pt with elevated BP and CVA type symptoms.  Out of window for TPA or even endovascular intervention.  WIll get CT and labs and will need admission.  Discussed care multiple times with family

## 2018-10-05 NOTE — CONSULT NOTE ADULT - ASSESSMENT
89 yo woman with acute right hemispheric MCA infarct.  There is h/o HTN, s/p bilateral carotid endarterectomy, AVR.  No TPA since infarct occurred yesterday AM. R/O left MCA/ICA disease vs a cardioembolic event, R/O A Fib.    REC:  I'd not aggressively treat the blood pressure in setting of acute stroke in someone of this age with chronic hypertension in spite of systolic of 220 in ED, continue usual antihypertensives, add ASA 81 mg, Statin, speech swallow eval, PT/OT, carotid sonogram, TTE, lipids, DVT prophylaxis, aspiration precautions, repeat CT head 10/7. 89 yo woman with acute right hemispheric MCA infarct.  There is h/o HTN, s/p bilateral carotid endarterectomy, AVR.  No TPA since infarct occurred yesterday AM. R/O right  MCA/ICA disease vs a cardioembolic event, R/O A Fib.    REC:  I'd not aggressively treat the blood pressure in setting of acute stroke in someone of this age with chronic hypertension in spite of systolic of 220 in ED, continue usual antihypertensives, add ASA 81 mg, Statin, speech swallow eval, PT/OT, carotid sonogram, TTE, lipids, DVT prophylaxis, aspiration precautions, repeat CT head 10/7.

## 2018-10-05 NOTE — PROGRESS NOTE ADULT - SUBJECTIVE AND OBJECTIVE BOX
Patient is a 89y old  Female who presents with a chief complaint of Left sided Weakness (05 Oct 2018 16:24)    PAST MEDICAL & SURGICAL HISTORY:  Hypertension, unspecified type  History of total hip replacement, right: 2014 at [facility],  No complications  S/p bilateral carotid endarterectomy: 2008 [facility], no complications  S/P AVR: 2008 at [facility], Dr. Bermudez.  No complications    AMY STEPHENSON   89y    Female    BRIEF HOSPITAL COURSE:    Review of Systems:                       All other ROS are negative.    Allergies    ACE inhibitors (Other)  hydrALAZINE (Other)    Intolerances          ICU Vital Signs Last 24 Hrs  T(C): 36.6 (05 Oct 2018 21:00), Max: 36.9 (05 Oct 2018 15:53)  T(F): 97.8 (05 Oct 2018 21:00), Max: 98.5 (05 Oct 2018 15:53)  HR: 62 (05 Oct 2018 23:00) (60 - 73)  BP: 163/64 (05 Oct 2018 23:00) (116/97 - 212/61)  BP(mean): 90 (05 Oct 2018 23:00) (72 - 104)  ABP: --  ABP(mean): --  RR: 16 (05 Oct 2018 23:00) (14 - 27)  SpO2: 97% (05 Oct 2018 23:00) (88% - 100%)    Physical Examination:    General:     HEENT:     PULM:     CVS:     ABD:     EXT:     SKIN:     Neuro:          LABS:                        12.5   8.2   )-----------( 193      ( 05 Oct 2018 10:45 )             39.0     10-05    136  |  100  |  43<H>  ----------------------------<  123<H>  3.8   |  26  |  1.60<H>    Ca    10.1      05 Oct 2018 10:45    TPro  9.0<H>  /  Alb  3.9  /  TBili  0.4  /  DBili  x   /  AST  24  /  ALT  11  /  AlkPhos  50  10-05          CAPILLARY BLOOD GLUCOSE      POCT Blood Glucose.: 125 mg/dL (05 Oct 2018 10:38)    PT/INR - ( 05 Oct 2018 10:45 )   PT: 11.1 sec;   INR: 1.00 ratio         PTT - ( 05 Oct 2018 10:45 )  PTT:23.0 sec    CULTURES:      Medications:  MEDICATIONS  (STANDING):  aspirin  chewable 81 milliGRAM(s) Oral daily  atorvastatin 20 milliGRAM(s) Oral at bedtime  cloNIDine Patch 0.1 mG/24Hr(s) 1 patch Transdermal every 7 days  heparin  Injectable 5000 Unit(s) SubCutaneous every 12 hours  influenza   Vaccine 0.5 milliLiter(s) IntraMuscular once  losartan 50 milliGRAM(s) Oral every 12 hours  metoprolol succinate ER 50 milliGRAM(s) Oral every 12 hours  niCARdipine Infusion 5 mG/Hr (25 mL/Hr) IV Continuous <Continuous>  pantoprazole    Tablet 40 milliGRAM(s) Oral daily    MEDICATIONS  (PRN):        10-05 @ 07:01  -  10-05 @ 23:52  --------------------------------------------------------  IN: 412.5 mL / OUT: 250 mL / NET: 162.5 mL        RADIOLOGY/IMAGING/ECHO  < from: CT Brain Stroke Protocol (10.05.18 @ 10:49) >  EXAM:  CT BRAIN STROKE PROTOCOL      PROCEDURE DATE:  10/05/2018        INTERPRETATION:  CT brain without contrast    History facial droop and slurred speech.    There are no prior studies for comparison    There is no acute intracranial hemorrhage, mass effect, cortical edema or   hydrocephalus. The [place] vessels are symmetric in attenuation.   There are small chronic right parietal and left cerebellar infarcts.   There is no skull fracture.    IMPRESSION:    No acute findings      < from: Xray Chest 1 View- PORTABLE-Routine (10.05.18 @ 11:13) >    CXR  clear            Assessment/Plan: Patient is a 89y old  Female who presents with a chief complaint of Left sided Weakness (05 Oct 2018 16:24)    PAST MEDICAL & SURGICAL HISTORY:  Hypertension, unspecified type  History of total hip replacement, right: 2014 at [facility],  No complications  S/p bilateral carotid endarterectomy: 2008 st [facility], no complications  S/P AVR: 2008 at [facility], Dr. Bermudez.  No complications    AMY STEPHENSON   89y    Female    BRIEF HOSPITAL COURSE:  admit with clinical CVA    Review of Systems:  No HA + L arm pain L vc cut                       All other ROS are negative.    Allergies    ACE inhibitors (Other)  hydrALAZINE (Other)    Intolerances          ICU Vital Signs Last 24 Hrs  T(C): 36.6 (05 Oct 2018 21:00), Max: 36.9 (05 Oct 2018 15:53)  T(F): 97.8 (05 Oct 2018 21:00), Max: 98.5 (05 Oct 2018 15:53)  HR: 62 (05 Oct 2018 23:00) (60 - 73)  BP: 163/64 (05 Oct 2018 23:00) (116/97 - 212/61)  BP(mean): 90 (05 Oct 2018 23:00) (72 - 104)  ABP: --  ABP(mean): --  RR: 16 (05 Oct 2018 23:00) (14 - 27)  SpO2: 97% (05 Oct 2018 23:00) (88% - 100%)    Physical Examination:    General:  alert awake     HEENT:  L VC cut    PULM: bilateral BS    CVS: s1 s2 reg     ABD: soft     EXT: no edema     SKIN: warm     Neuro:  L HP           LABS:                        12.5   8.2   )-----------( 193      ( 05 Oct 2018 10:45 )             39.0     10-05    136  |  100  |  43<H>  ----------------------------<  123<H>  3.8   |  26  |  1.60<H>    Ca    10.1      05 Oct 2018 10:45    TPro  9.0<H>  /  Alb  3.9  /  TBili  0.4  /  DBili  x   /  AST  24  /  ALT  11  /  AlkPhos  50  10-05          CAPILLARY BLOOD GLUCOSE      POCT Blood Glucose.: 125 mg/dL (05 Oct 2018 10:38)    PT/INR - ( 05 Oct 2018 10:45 )   PT: 11.1 sec;   INR: 1.00 ratio         PTT - ( 05 Oct 2018 10:45 )  PTT:23.0 sec    CULTURES:      Medications:  MEDICATIONS  (STANDING):  aspirin  chewable 81 milliGRAM(s) Oral daily  atorvastatin 20 milliGRAM(s) Oral at bedtime  cloNIDine Patch 0.1 mG/24Hr(s) 1 patch Transdermal every 7 days  heparin  Injectable 5000 Unit(s) SubCutaneous every 12 hours  influenza   Vaccine 0.5 milliLiter(s) IntraMuscular once  losartan 50 milliGRAM(s) Oral every 12 hours  metoprolol succinate ER 50 milliGRAM(s) Oral every 12 hours  niCARdipine Infusion 5 mG/Hr (25 mL/Hr) IV Continuous <Continuous>  pantoprazole    Tablet 40 milliGRAM(s) Oral daily    MEDICATIONS  (PRN):        10-05 @ 07:01  -  10-05 @ 23:52  --------------------------------------------------------  IN: 412.5 mL / OUT: 250 mL / NET: 162.5 mL        RADIOLOGY/IMAGING/ECHO  < from: CT Brain Stroke Protocol (10.05.18 @ 10:49) >  EXAM:  CT BRAIN STROKE PROTOCOL      PROCEDURE DATE:  10/05/2018        INTERPRETATION:  CT brain without contrast    History facial droop and slurred speech.    There are no prior studies for comparison    There is no acute intracranial hemorrhage, mass effect, cortical edema or   hydrocephalus. The [place] vessels are symmetric in attenuation.   There are small chronic right parietal and left cerebellar infarcts.   There is no skull fracture.    IMPRESSION:    No acute findings      < from: Xray Chest 1 View- PORTABLE-Routine (10.05.18 @ 11:13) >    CXR  clear        Assessment/Plan:      89F  hx HTN AVR carotid endarterectomies admit with clinical R MCA with L HP.  Initial CT neg by radiologist.  Outside of the TPA and catheter based therapies window.  Her BP was > 220 systolic.      Post stroke protocol  carotid doppler repeat CT.  MRI if unrevealing   Statin lipid profile.

## 2018-10-05 NOTE — ED ADULT NURSE NOTE - NSIMPLEMENTINTERV_GEN_ALL_ED
Implemented All Fall with Harm Risk Interventions:  [place] to call system. Call bell, personal items and telephone within reach. Instruct patient to call for assistance. Room bathroom lighting operational. Non-slip footwear when patient is off stretcher. Physically safe environment: no spills, clutter or unnecessary equipment. Stretcher in lowest position, wheels locked, appropriate side rails in place. Provide visual cue, wrist band, yellow gown, etc. Monitor gait and stability. Monitor for mental status changes and reorient to person, place, and time. Review medications for side effects contributing to fall risk. Reinforce activity limits and safety measures with patient and family. Provide visual clues: red socks.

## 2018-10-05 NOTE — ED PROVIDER NOTE - CRITICAL CARE PROVIDED
interpretation of diagnostic studies/consultation with other physicians/telephone consultation with the patient's family/additional history taking/direct patient care (not related to procedure)/documentation

## 2018-10-05 NOTE — CONSULT NOTE ADULT - SUBJECTIVE AND OBJECTIVE BOX
Neurology consult    AMY [id number]     Patient is a 89y old  Female who presents with a chief complaint of left hemiparesis since yesterday morning.  H/o hypertension, bilateral carotid endarterectomy and AVR replacement years ago.    HPI: patient noted to have left hemiparesis on awakening yesterday AM and  today presents for admission.  BP systolic 220 in ED and admitted to CCU.  No headache seizure confusion trauma.  No Prior h/o CVA      REVIEW OF SYSTEMS:    Constitutional: No fever, chills, fatigue, weakness  Eyes: no eye pain, visual disturbances, or discharge  ENT:  No difficulty hearing, tinnitus, vertigo; No sinus or throat pain  Neck: No pain or stiffness  Respiratory: No cough, dyspnea, wheezing   Cardiovascular: No chest pain, palpitations,   Gastrointestinal: No abdominal or epigastric pain. No nausea, vomiting  No diarrhea or constipation.   Genitourinary: No dysuria, frequency, hematuria or incontinence  Neurological: No headaches, lightheadedness, vertigo, numbness or tremors  Psychiatric: No depression, anxiety, mood swings or difficulty sleeping  Musculoskeletal: No joint pain or swelling; No muscle, back or extremity pain  Skin: No itching, burning, rashes or lesions   Lymph Nodes: No enlarged glands  Endocrine: No heat or cold intolerance; No hair loss, No h/o diabetes or thyroid dysfunction  Allergy and Immunologic: No hives or eczema    MEDICATIONS        PMH: Hypertension, unspecified type       PSH: History of total hip replacement, right  S/p bilateral carotid endarterectomy  S/P AVR  No significant past surgical history      Family history:   FAMILY HISTORY:  No pertinent family history in first degree relatives      SOCIAL HISTORY:  No history of tobacco or alcohol use     Allergies    ACE inhibitors (Swelling)  hydrALAZINE (Rash)    Intolerances            Vital Signs Last 24 Hrs  T(C): 36 (05 Oct 2018 10:56), Max: 36 (05 Oct 2018 10:56)  T(F): 96.8 (05 Oct 2018 10:56), Max: 96.8 (05 Oct 2018 10:56)  HR: 67 (05 Oct 2018 13:25) (60 - 67)  BP: 192/61 (05 Oct 2018 13:25) (191/72 - 212/59)  BP(mean): --  RR: 16 (05 Oct 2018 13:25) (14 - 17)  SpO2: 97% (05 Oct 2018 13:25) (95% - 97%)      On Neurological Examination:    Head: normocephalic Neck: supple no carotid bruits    Mental Status - Patient is alert, awake, oriented and no dysarthria no aphasia    Cranial Nerves - PERRL, right gaze preference, left VF cut, left VII paresis, tongue deviates left    Motor Exam :  left hemiparesis arm>leg at 2/5 upper limb and 3 to 4/5 lower limb    Sensory    left sided DSS extinction to touch    Reflexes:  increase left with left babinski.                                                                   RADIOLOGY  CT: My review reveals the acute right MCA predominately parietal infarct.

## 2018-10-05 NOTE — HISTORY OF PRESENT ILLNESS
[FreeTextEntry1] : here for medicare susbsequent visit [de-identified] : here weith daughter\par recentyly cardio, Dr. corey, [facility] \par had ekg which was unchanged, echo pending\par patient asymtpomtic, no chest pain, sob\par sees wound care in [place]

## 2018-10-05 NOTE — ED ADULT TRIAGE NOTE - CHIEF COMPLAINT QUOTE
arrived with [facility] from home with complaints of stroke like symptoms that started approx 9am yesterday, pt fell today and  could not get her up.

## 2018-10-06 LAB
ANION GAP SERPL CALC-SCNC: 10 MMOL/L — SIGNIFICANT CHANGE UP (ref 5–17)
BUN SERPL-MCNC: 38 MG/DL — HIGH (ref 7–23)
CALCIUM SERPL-MCNC: 9.3 MG/DL — SIGNIFICANT CHANGE UP (ref 8.4–10.5)
CHLORIDE SERPL-SCNC: 100 MMOL/L — SIGNIFICANT CHANGE UP (ref 96–108)
CHOLEST SERPL-MCNC: 185 MG/DL — SIGNIFICANT CHANGE UP (ref 10–199)
CO2 SERPL-SCNC: 25 MMOL/L — SIGNIFICANT CHANGE UP (ref 22–31)
CREAT SERPL-MCNC: 1.61 MG/DL — HIGH (ref 0.5–1.3)
GLUCOSE SERPL-MCNC: 178 MG/DL — HIGH (ref 70–99)
HBA1C BLD-MCNC: 5.7 % — HIGH (ref 4–5.6)
HCT VFR BLD CALC: 36.1 % — SIGNIFICANT CHANGE UP (ref 34.5–45)
HDLC SERPL-MCNC: 92 MG/DL — SIGNIFICANT CHANGE UP
HGB BLD-MCNC: 11.7 G/DL — SIGNIFICANT CHANGE UP (ref 11.5–15.5)
LIPID PNL WITH DIRECT LDL SERPL: 77 MG/DL — SIGNIFICANT CHANGE UP
MAGNESIUM SERPL-MCNC: 1.8 MG/DL — SIGNIFICANT CHANGE UP (ref 1.6–2.6)
MCHC RBC-ENTMCNC: 32.3 GM/DL — SIGNIFICANT CHANGE UP (ref 32–36)
MCHC RBC-ENTMCNC: 32.4 PG — SIGNIFICANT CHANGE UP (ref 27–34)
MCV RBC AUTO: 100.3 FL — HIGH (ref 80–100)
PHOSPHATE SERPL-MCNC: 3.1 MG/DL — SIGNIFICANT CHANGE UP (ref 2.5–4.5)
PLATELET # BLD AUTO: 200 K/UL — SIGNIFICANT CHANGE UP (ref 150–400)
POTASSIUM SERPL-MCNC: 3.8 MMOL/L — SIGNIFICANT CHANGE UP (ref 3.5–5.3)
POTASSIUM SERPL-SCNC: 3.8 MMOL/L — SIGNIFICANT CHANGE UP (ref 3.5–5.3)
RBC # BLD: 3.6 M/UL — LOW (ref 3.8–5.2)
RBC # FLD: 12.4 % — SIGNIFICANT CHANGE UP (ref 10.3–14.5)
SODIUM SERPL-SCNC: 135 MMOL/L — SIGNIFICANT CHANGE UP (ref 135–145)
TOTAL CHOLESTEROL/HDL RATIO MEASUREMENT: 2 RATIO — LOW (ref 3.3–7.1)
TRIGL SERPL-MCNC: 81 MG/DL — SIGNIFICANT CHANGE UP (ref 10–149)
WBC # BLD: 5.6 K/UL — SIGNIFICANT CHANGE UP (ref 3.8–10.5)
WBC # FLD AUTO: 5.6 K/UL — SIGNIFICANT CHANGE UP (ref 3.8–10.5)

## 2018-10-06 PROCEDURE — 70450 CT HEAD/BRAIN W/O DYE: CPT | Mod: 26

## 2018-10-06 RX ORDER — METOPROLOL TARTRATE 50 MG
50 TABLET ORAL EVERY 12 HOURS
Qty: 0 | Refills: 0 | Status: DISCONTINUED | OUTPATIENT
Start: 2018-10-06 | End: 2018-10-06

## 2018-10-06 RX ORDER — LOSARTAN POTASSIUM 100 MG/1
50 TABLET, FILM COATED ORAL EVERY 12 HOURS
Qty: 0 | Refills: 0 | Status: DISCONTINUED | OUTPATIENT
Start: 2018-10-06 | End: 2018-10-09

## 2018-10-06 RX ORDER — METOPROLOL TARTRATE 50 MG
50 TABLET ORAL EVERY 12 HOURS
Qty: 0 | Refills: 0 | Status: DISCONTINUED | OUTPATIENT
Start: 2018-10-06 | End: 2018-10-09

## 2018-10-06 RX ORDER — ATORVASTATIN CALCIUM 80 MG/1
40 TABLET, FILM COATED ORAL AT BEDTIME
Qty: 0 | Refills: 0 | Status: DISCONTINUED | OUTPATIENT
Start: 2018-10-06 | End: 2018-10-15

## 2018-10-06 RX ORDER — METOPROLOL TARTRATE 50 MG
2.5 TABLET ORAL ONCE
Qty: 0 | Refills: 0 | Status: COMPLETED | OUTPATIENT
Start: 2018-10-06 | End: 2018-10-06

## 2018-10-06 RX ORDER — DEXAMETHASONE 0.5 MG/5ML
4 ELIXIR ORAL ONCE
Qty: 0 | Refills: 0 | Status: COMPLETED | OUTPATIENT
Start: 2018-10-06 | End: 2018-10-06

## 2018-10-06 RX ORDER — ACETAMINOPHEN 500 MG
650 TABLET ORAL EVERY 6 HOURS
Qty: 0 | Refills: 0 | Status: DISCONTINUED | OUTPATIENT
Start: 2018-10-06 | End: 2018-10-09

## 2018-10-06 RX ORDER — DOCUSATE SODIUM 100 MG
100 CAPSULE ORAL
Qty: 0 | Refills: 0 | Status: DISCONTINUED | OUTPATIENT
Start: 2018-10-06 | End: 2018-10-11

## 2018-10-06 RX ADMIN — Medication 81 MILLIGRAM(S): at 11:30

## 2018-10-06 RX ADMIN — Medication 650 MILLIGRAM(S): at 10:08

## 2018-10-06 RX ADMIN — Medication 1 PATCH: at 11:32

## 2018-10-06 RX ADMIN — ATORVASTATIN CALCIUM 40 MILLIGRAM(S): 80 TABLET, FILM COATED ORAL at 21:33

## 2018-10-06 RX ADMIN — Medication 100 MILLIGRAM(S): at 21:35

## 2018-10-06 RX ADMIN — Medication 650 MILLIGRAM(S): at 22:33

## 2018-10-06 RX ADMIN — Medication 650 MILLIGRAM(S): at 23:30

## 2018-10-06 RX ADMIN — Medication 650 MILLIGRAM(S): at 00:40

## 2018-10-06 RX ADMIN — Medication 650 MILLIGRAM(S): at 10:38

## 2018-10-06 RX ADMIN — LOSARTAN POTASSIUM 50 MILLIGRAM(S): 100 TABLET, FILM COATED ORAL at 18:37

## 2018-10-06 RX ADMIN — Medication 50 MILLIGRAM(S): at 17:32

## 2018-10-06 RX ADMIN — Medication 4 MILLIGRAM(S): at 02:03

## 2018-10-06 RX ADMIN — Medication 50 MILLIGRAM(S): at 08:36

## 2018-10-06 RX ADMIN — HEPARIN SODIUM 5000 UNIT(S): 5000 INJECTION INTRAVENOUS; SUBCUTANEOUS at 05:57

## 2018-10-06 RX ADMIN — Medication 650 MILLIGRAM(S): at 01:00

## 2018-10-06 RX ADMIN — PANTOPRAZOLE SODIUM 40 MILLIGRAM(S): 20 TABLET, DELAYED RELEASE ORAL at 11:30

## 2018-10-06 RX ADMIN — Medication 2.5 MILLIGRAM(S): at 22:27

## 2018-10-06 RX ADMIN — LOSARTAN POTASSIUM 50 MILLIGRAM(S): 100 TABLET, FILM COATED ORAL at 08:36

## 2018-10-06 RX ADMIN — HEPARIN SODIUM 5000 UNIT(S): 5000 INJECTION INTRAVENOUS; SUBCUTANEOUS at 17:32

## 2018-10-06 NOTE — PROGRESS NOTE ADULT - SUBJECTIVE AND OBJECTIVE BOX
Follow-up Critical Care Progress Note  Chief Complaint : Other symptom or sign involving musculoskeletal system      BP improved  neuro status unchanged  protecting airway  overnight dtr bedside stated wanted steroids for h/o carpel tunel spasm, extensive discussion had that pt likely had acute cva and steroids can effect glucose as well as recovery from cva. at this time will recommend to avoid steroids until evaluated by neuro .  Pending repeat CT head this am.     Allergies :ACE inhibitors (Other)  hydrALAZINE (Other)      PAST MEDICAL & SURGICAL HISTORY:  Hypertension, unspecified type  History of total hip replacement, right: 2014 at [facility],  No complications  S/p bilateral carotid endarterectomy: 2008 [facility], no complications  S/P AVR: 2008 at [facility], Dr. Bermudez.  No complications      Medications:  MEDICATIONS  (STANDING):  aspirin  chewable 81 milliGRAM(s) Oral daily  atorvastatin 40 milliGRAM(s) Oral at bedtime  cloNIDine Patch 0.1 mG/24Hr(s) 1 patch Transdermal every 7 days  heparin  Injectable 5000 Unit(s) SubCutaneous every 12 hours  influenza   Vaccine 0.5 milliLiter(s) IntraMuscular once  losartan 50 milliGRAM(s) Oral every 12 hours  pantoprazole    Tablet 40 milliGRAM(s) Oral daily    MEDICATIONS  (PRN):  acetaminophen   Tablet .. 650 milliGRAM(s) Oral every 6 hours PRN Mild Pain (1 - 3)      LABS:                        11.7   5.6   )-----------( 200      ( 06 Oct 2018 05:30 )             36.1     10-06    135  |  100  |  38<H>  ----------------------------<  178<H>  3.8   |  25  |  1.61<H>    Ca    9.3      06 Oct 2018 05:30  Phos  3.1     10-06  Mg     1.8     10-06    TPro  9.0<H>  /  Alb  3.9  /  TBili  0.4  /  DBili  x   /  AST  24  /  ALT  11  /  AlkPhos  50  10-05            PT/INR - ( 05 Oct 2018 10:45 )   PT: 11.1 sec;   INR: 1.00 ratio         PTT - ( 05 Oct 2018 10:45 )  PTT:23.0 sec            CULTURES: (if applicable)          CAPILLARY BLOOD GLUCOSE      POCT Blood Glucose.: 125 mg/dL (05 Oct 2018 10:38)      RADIOLOGY  CXR:  < from: Xray Chest 1 View- PORTABLE-Routine (10.05.18 @ 11:13) >  Impression: No acute pulmonary disease.    < end of copied text >      CT:    Repeat CT pending  ECHO:  Pending    VITALS:  T(C): 36.6 (10-06-18 @ 03:00), Max: 36.9 (10-05-18 @ 15:53)  T(F): 97.8 (10-06-18 @ 03:00), Max: 98.5 (10-05-18 @ 15:53)  HR: 65 (10-06-18 @ 08:38) (60 - 73)  BP: 174/52 (10-06-18 @ 08:38) (116/97 - 212/61)  BP(mean): 89 (10-06-18 @ 08:38) (72 - 104)  ABP: --  ABP(mean): --  RR: 20 (10-06-18 @ 08:38) (13 - 38)  SpO2: 95% (10-06-18 @ 08:38) (88% - 100%)  CVP(mm Hg): --  CVP(cm H2O): --    Ins and Outs     10-05-18 @ 07:01  -  10-06-18 @ 07:00  --------------------------------------------------------  IN: 462.5 mL / OUT: 250 mL / NET: 212.5 mL    10-06-18 @ 07:01  -  10-06-18 @ 08:51  --------------------------------------------------------  IN: 240 mL / OUT: 0 mL / NET: 240 mL        Height (cm): 160.02 (10-05-18 @ 15:13)

## 2018-10-06 NOTE — PROGRESS NOTE ADULT - ASSESSMENT
Physical Examination:  GENERAL:               Alert, Oriented, No acute distress.    HEENT:                    Pupils equal, reactive to light does not pass midline to the left  Symmetric. No JVD, Moist MM  PULM:                     Bilateral air entry, Clear to auscultation bilaterally, no significant sputum production, No Rales, No Rhonchi, No Wheezing  CVS:                         S1, S2,  +Murmur  ABD:                        Soft, nondistended, nontender, normoactive bowel sounds,   EXT:                         No edema, nontender, No Cyanosis or Clubbing   Vascular:                Warm Extremities, Normal Capillary refill, Normal Distal Pulses  SKIN:                       Warm and well perfused, no rashes noted.   NEURO:                  Alert, oriented, interactive, Left HP with 2/5 LUE - LLE 4/5 Right side 5/5, left hemineglect  PSYC:                      Calm, + Insight.        Assessment  Acute CVA with Left HP x  1 day, not a candidate for acute tpa  uncontrolled bp, elevated BP  VHD s/p AVR    Plan   BP control keep < 180  Repeat CT head  asa, statin  Echo  US Coratids   neuro f/u  echo  us carotids  supportive care .    PMD:			Kita	                   Notified(Date): 9/5 attempt by Dr. Escalante in ED - but on vacation  Family Updated: 	DTR	     bedside                            Date: 9/6      Sedation & Analgesia:	on hold  Diet/Nutrition:		as tolerated  GI PPx:			PPI    DVT Ppx:		Heparin  	RISK                                                          Points  	[ ] Previous VTE                                           	3  	[ ] Thrombophilia                                        	2  	[ ] Lower limb paralysis                              	2   	[ ] Current Cancer                                       	2   	[ x] Immobilization > 24 hrs                        	1  	[ x] ICU/CCU stay > 24 hours                       	1  	[x ] Age > 60                                                   	1  		Total:[ 3]      Activity:		               OOB PT  Head of Bed:               35-45 deg  Glycemic Control:        Insulin    Lines:  PIV    Restraints were deemed necessary to prevent removal of life-sustaining devices [  ] YES   [x    ]  NO    Disposition: Can transfer to [facility], case d/w Dr. Bartholomew who will Accept tpt upon transfer.     Goals of Care:Full Code. Physical Examination:  GENERAL:               Alert, Oriented, No acute distress.    HEENT:                    Pupils equal, reactive to light does not pass midline to the left  Symmetric. No JVD, Moist MM  PULM:                     Bilateral air entry, Clear to auscultation bilaterally, no significant sputum production, No Rales, No Rhonchi, No Wheezing  CVS:                         S1, S2,  +Murmur  ABD:                        Soft, nondistended, nontender, normoactive bowel sounds,   EXT:                         No edema, nontender, No Cyanosis or Clubbing   Vascular:                Warm Extremities, Normal Capillary refill, Normal Distal Pulses  SKIN:                       Warm and well perfused, no rashes noted.   NEURO:                  Alert, oriented, interactive, Left HP with 2/5 LUE - LLE 4/5 Right side 5/5, left hemineglect  PSYC:                      Calm, + Insight.        Assessment  Acute CVA with Left HP x  1 day, not a candidate for acute tpa  uncontrolled bp, elevated BP  VHD s/p AVR    Plan   BP control keep < 180  Repeat CT head  asa, statin  Echo  US Coratids   neuro f/u  echo  us carotids  supportive care .    PMD:			Kita	                   Notified(Date): 10/5 attempt by Dr. Escalante in ED - but on vacation  Family Updated: 	DTR	     bedside                            Date: 10/6      Sedation & Analgesia:	on hold  Diet/Nutrition:		as tolerated  GI PPx:			PPI    DVT Ppx:		Heparin  	RISK                                                          Points  	[ ] Previous VTE                                           	3  	[ ] Thrombophilia                                        	2  	[ ] Lower limb paralysis                              	2   	[ ] Current Cancer                                       	2   	[ x] Immobilization > 24 hrs                        	1  	[ x] ICU/CCU stay > 24 hours                       	1  	[x ] Age > 60                                                   	1  		Total:[ 3]      Activity:		               OOB PT  Head of Bed:               35-45 deg  Glycemic Control:        Insulin    Lines:  PIV    Restraints were deemed necessary to prevent removal of life-sustaining devices [  ] YES   [x    ]  NO    Disposition: Can transfer to [facility], case d/w Dr. Bartholomew who will Accept tpt upon transfer.     Goals of Care:Full Code.

## 2018-10-07 DIAGNOSIS — I63.9 CEREBRAL INFARCTION, UNSPECIFIED: ICD-10-CM

## 2018-10-07 DIAGNOSIS — R73.03 PREDIABETES: ICD-10-CM

## 2018-10-07 DIAGNOSIS — I10 ESSENTIAL (PRIMARY) HYPERTENSION: ICD-10-CM

## 2018-10-07 DIAGNOSIS — N17.9 ACUTE KIDNEY FAILURE, UNSPECIFIED: ICD-10-CM

## 2018-10-07 DIAGNOSIS — E87.1 HYPO-OSMOLALITY AND HYPONATREMIA: ICD-10-CM

## 2018-10-07 DIAGNOSIS — D53.9 NUTRITIONAL ANEMIA, UNSPECIFIED: ICD-10-CM

## 2018-10-07 DIAGNOSIS — Z95.2 PRESENCE OF PROSTHETIC HEART VALVE: ICD-10-CM

## 2018-10-07 PROBLEM — Z23 ENCOUNTER FOR IMMUNIZATION: Status: ACTIVE | Noted: 2017-11-29

## 2018-10-07 LAB
ANION GAP SERPL CALC-SCNC: 9 MMOL/L — SIGNIFICANT CHANGE UP (ref 5–17)
BUN SERPL-MCNC: 36 MG/DL — HIGH (ref 7–23)
CALCIUM SERPL-MCNC: 9.3 MG/DL — SIGNIFICANT CHANGE UP (ref 8.4–10.5)
CHLORIDE SERPL-SCNC: 99 MMOL/L — SIGNIFICANT CHANGE UP (ref 96–108)
CO2 SERPL-SCNC: 25 MMOL/L — SIGNIFICANT CHANGE UP (ref 22–31)
CREAT SERPL-MCNC: 1.41 MG/DL — HIGH (ref 0.5–1.3)
GLUCOSE SERPL-MCNC: 109 MG/DL — HIGH (ref 70–99)
HCT VFR BLD CALC: 35.8 % — SIGNIFICANT CHANGE UP (ref 34.5–45)
HGB BLD-MCNC: 11.4 G/DL — LOW (ref 11.5–15.5)
MAGNESIUM SERPL-MCNC: 1.9 MG/DL — SIGNIFICANT CHANGE UP (ref 1.6–2.6)
MCHC RBC-ENTMCNC: 32 GM/DL — SIGNIFICANT CHANGE UP (ref 32–36)
MCHC RBC-ENTMCNC: 32.2 PG — SIGNIFICANT CHANGE UP (ref 27–34)
MCV RBC AUTO: 100.5 FL — HIGH (ref 80–100)
PHOSPHATE SERPL-MCNC: 3.2 MG/DL — SIGNIFICANT CHANGE UP (ref 2.5–4.5)
PLATELET # BLD AUTO: 184 K/UL — SIGNIFICANT CHANGE UP (ref 150–400)
POTASSIUM SERPL-MCNC: 3.6 MMOL/L — SIGNIFICANT CHANGE UP (ref 3.5–5.3)
POTASSIUM SERPL-SCNC: 3.6 MMOL/L — SIGNIFICANT CHANGE UP (ref 3.5–5.3)
RBC # BLD: 3.56 M/UL — LOW (ref 3.8–5.2)
RBC # FLD: 12.3 % — SIGNIFICANT CHANGE UP (ref 10.3–14.5)
SODIUM SERPL-SCNC: 133 MMOL/L — LOW (ref 135–145)
WBC # BLD: 7 K/UL — SIGNIFICANT CHANGE UP (ref 3.8–10.5)
WBC # FLD AUTO: 7 K/UL — SIGNIFICANT CHANGE UP (ref 3.8–10.5)

## 2018-10-07 PROCEDURE — 93880 EXTRACRANIAL BILAT STUDY: CPT | Mod: 26

## 2018-10-07 PROCEDURE — 99222 1ST HOSP IP/OBS MODERATE 55: CPT

## 2018-10-07 PROCEDURE — 93306 TTE W/DOPPLER COMPLETE: CPT | Mod: 26

## 2018-10-07 PROCEDURE — 99233 SBSQ HOSP IP/OBS HIGH 50: CPT

## 2018-10-07 RX ORDER — NIFEDIPINE 30 MG
30 TABLET, EXTENDED RELEASE 24 HR ORAL ONCE
Qty: 0 | Refills: 0 | Status: COMPLETED | OUTPATIENT
Start: 2018-10-07 | End: 2018-10-07

## 2018-10-07 RX ADMIN — PANTOPRAZOLE SODIUM 40 MILLIGRAM(S): 20 TABLET, DELAYED RELEASE ORAL at 11:13

## 2018-10-07 RX ADMIN — LOSARTAN POTASSIUM 50 MILLIGRAM(S): 100 TABLET, FILM COATED ORAL at 05:49

## 2018-10-07 RX ADMIN — Medication 100 MILLIGRAM(S): at 05:48

## 2018-10-07 RX ADMIN — HEPARIN SODIUM 5000 UNIT(S): 5000 INJECTION INTRAVENOUS; SUBCUTANEOUS at 05:48

## 2018-10-07 RX ADMIN — HEPARIN SODIUM 5000 UNIT(S): 5000 INJECTION INTRAVENOUS; SUBCUTANEOUS at 17:32

## 2018-10-07 RX ADMIN — ATORVASTATIN CALCIUM 40 MILLIGRAM(S): 80 TABLET, FILM COATED ORAL at 21:19

## 2018-10-07 RX ADMIN — Medication 100 MILLIGRAM(S): at 17:33

## 2018-10-07 RX ADMIN — Medication 30 MILLIGRAM(S): at 00:59

## 2018-10-07 RX ADMIN — Medication 50 MILLIGRAM(S): at 17:33

## 2018-10-07 RX ADMIN — LOSARTAN POTASSIUM 50 MILLIGRAM(S): 100 TABLET, FILM COATED ORAL at 17:33

## 2018-10-07 RX ADMIN — Medication 81 MILLIGRAM(S): at 11:13

## 2018-10-07 RX ADMIN — Medication 50 MILLIGRAM(S): at 05:48

## 2018-10-07 NOTE — PROGRESS NOTE ADULT - SUBJECTIVE AND OBJECTIVE BOX
Follow-up Critical Care Progress Note  Chief Complaint : Other symptom or sign involving musculoskeletal system    patient transffered out of ICU yesterday  bp now 180s systolic, but overnight episodes of HTN noted  pt comfortable no acute changes  d/w DTR bedside  d/w Dr. Ibanez this am  D/w Dr. Mishra this am.      Allergies :ACE inhibitors (Other)  hydrALAZINE (Other)      PAST MEDICAL & SURGICAL HISTORY:  Hypertension, unspecified type  History of total hip replacement, right: 2014 at [facility],  No complications  S/p bilateral carotid endarterectomy: 2008 [facility], no complications  S/P AVR: 2008 at [facility], Dr. Bermudez.  No complications      Medications:  MEDICATIONS  (STANDING):  aspirin  chewable 81 milliGRAM(s) Oral daily  atorvastatin 40 milliGRAM(s) Oral at bedtime  cloNIDine Patch 0.1 mG/24Hr(s) 1 patch Transdermal every 7 days  docusate sodium 100 milliGRAM(s) Oral two times a day  heparin  Injectable 5000 Unit(s) SubCutaneous every 12 hours  influenza   Vaccine 0.5 milliLiter(s) IntraMuscular once  losartan 50 milliGRAM(s) Oral every 12 hours  metoprolol succinate ER 50 milliGRAM(s) Oral every 12 hours  pantoprazole    Tablet 40 milliGRAM(s) Oral daily    MEDICATIONS  (PRN):  acetaminophen   Tablet .. 650 milliGRAM(s) Oral every 6 hours PRN Mild Pain (1 - 3)      LABS:                        11.4   7.0   )-----------( 184      ( 07 Oct 2018 05:32 )             35.8     10-07    133<L>  |  99  |  36<H>  ----------------------------<  109<H>  3.6   |  25  |  1.41<H>    Ca    9.3      07 Oct 2018 05:32  Phos  3.2     10-07  Mg     1.9     10-07    TPro  9.0<H>  /  Alb  3.9  /  TBili  0.4  /  DBili  x   /  AST  24  /  ALT  11  /  AlkPhos  50  10-05            PT/INR - ( 05 Oct 2018 10:45 )   PT: 11.1 sec;   INR: 1.00 ratio         PTT - ( 05 Oct 2018 10:45 )  PTT:23.0 sec        RADIOLOGY  CXR:      CT:  < from: CT Head No Cont (10.06.18 @ 11:04) >  IMPRESSION: Evolving acute/subacute infarcts within the high right paramedian parietal and right occipital lobes. No hemorrhagic transformation.    < end of copied text >    ECHO:  Pending         VITALS:  T(C): 36.4 (10-07-18 @ 10:08), Max: 36.6 (10-06-18 @ 19:30)  T(F): 97.5 (10-07-18 @ 10:08), Max: 97.8 (10-06-18 @ 19:30)  HR: 55 (10-07-18 @ 10:08) (55 - 69)  BP: 180/53 (10-07-18 @ 10:08) (180/53 - 204/67)  BP(mean): --  ABP: --  ABP(mean): --  RR: 18 (10-07-18 @ 10:08) (15 - 18)  SpO2: 94% (10-07-18 @ 10:08) (94% - 98%)  CVP(mm Hg): --  CVP(cm H2O): --    Ins and Outs     10-06-18 @ 07:01  -  10-07-18 @ 07:00  --------------------------------------------------------  IN: 440 mL / OUT: 0 mL / NET: 440 mL        Height (cm): 160.02 (10-05-18 @ 15:13)            Physical Examination:  GENERAL:               Alert, Oriented, No acute distress.    HEENT:                    Pupils equal, reactive to light.  Symmetric. No JVD, Moist MM  PULM:                     Bilateral air entry, Clear to auscultation bilaterally, no significant sputum production, No Rales, No Rhonchi, No Wheezing  CVS:                         S1, S2,  No Murmur  ABD:                        Soft, nondistended, nontender, normoactive bowel sounds,   EXT:                         No edema, nontender, No Cyanosis or Clubbing   Vascular:                Warm Extremities, Normal Capillary refill, Normal Distal Pulses  SKIN:                       Warm and well perfused, no rashes noted.   NEURO:                  Alert, oriented, interactive, nonfocal, follows commands  PSYC:                      Calm, + Insight. Follow-up Critical Care Progress Note  Chief Complaint : Other symptom or sign involving musculoskeletal system    patient transffered out of ICU yesterday  bp now 180s systolic, but overnight episodes of HTN noted  pt comfortable no acute changes  d/w DTR bedside  d/w Dr. Ibanez this am  D/w Dr. Mishra this am.      Allergies :ACE inhibitors (Other)  hydrALAZINE (Other)      PAST MEDICAL & SURGICAL HISTORY:  Hypertension, unspecified type  History of total hip replacement, right: 2014 at [facility],  No complications  S/p bilateral carotid endarterectomy: 2008 [facility], no complications  S/P AVR: 2008 at [facility], Dr. Bermudez.  No complications      Medications:  MEDICATIONS  (STANDING):  aspirin  chewable 81 milliGRAM(s) Oral daily  atorvastatin 40 milliGRAM(s) Oral at bedtime  cloNIDine Patch 0.1 mG/24Hr(s) 1 patch Transdermal every 7 days  docusate sodium 100 milliGRAM(s) Oral two times a day  heparin  Injectable 5000 Unit(s) SubCutaneous every 12 hours  influenza   Vaccine 0.5 milliLiter(s) IntraMuscular once  losartan 50 milliGRAM(s) Oral every 12 hours  metoprolol succinate ER 50 milliGRAM(s) Oral every 12 hours  pantoprazole    Tablet 40 milliGRAM(s) Oral daily    MEDICATIONS  (PRN):  acetaminophen   Tablet .. 650 milliGRAM(s) Oral every 6 hours PRN Mild Pain (1 - 3)      LABS:                        11.4   7.0   )-----------( 184      ( 07 Oct 2018 05:32 )             35.8     10-07    133<L>  |  99  |  36<H>  ----------------------------<  109<H>  3.6   |  25  |  1.41<H>    Ca    9.3      07 Oct 2018 05:32  Phos  3.2     10-07  Mg     1.9     10-07    TPro  9.0<H>  /  Alb  3.9  /  TBili  0.4  /  DBili  x   /  AST  24  /  ALT  11  /  AlkPhos  50  10-05            PT/INR - ( 05 Oct 2018 10:45 )   PT: 11.1 sec;   INR: 1.00 ratio         PTT - ( 05 Oct 2018 10:45 )  PTT:23.0 sec        RADIOLOGY  CXR:      CT:  < from: CT Head No Cont (10.06.18 @ 11:04) >  IMPRESSION: Evolving acute/subacute infarcts within the high right paramedian parietal and right occipital lobes. No hemorrhagic transformation.    < end of copied text >    ECHO:  Pending         VITALS:  T(C): 36.4 (10-07-18 @ 10:08), Max: 36.6 (10-06-18 @ 19:30)  T(F): 97.5 (10-07-18 @ 10:08), Max: 97.8 (10-06-18 @ 19:30)  HR: 55 (10-07-18 @ 10:08) (55 - 69)  BP: 180/53 (10-07-18 @ 10:08) (180/53 - 204/67)  BP(mean): --  ABP: --  ABP(mean): --  RR: 18 (10-07-18 @ 10:08) (15 - 18)  SpO2: 94% (10-07-18 @ 10:08) (94% - 98%)  CVP(mm Hg): --  CVP(cm H2O): --    Ins and Outs     10-06-18 @ 07:01  -  10-07-18 @ 07:00  --------------------------------------------------------  IN: 440 mL / OUT: 0 mL / NET: 440 mL        Height (cm): 160.02 (10-05-18 @ 15:13)

## 2018-10-07 NOTE — PHYSICAL THERAPY INITIAL EVALUATION ADULT - CRITERIA FOR SKILLED THERAPEUTIC INTERVENTIONS
functional limitations in following categories/therapy frequency/rehab potential/anticipated discharge recommendation

## 2018-10-07 NOTE — CHART NOTE - NSCHARTNOTEFT_GEN_A_CORE
Earlier tonight, BP systolic persistent >200. Daughter reports pt is sx with pain in the neck and flushed and this is her usual symptomatology for high blood pressure.   Lopressor 2.5mg IV x 1 dose given.   called by RN. Repeat pressure now with  and pt with neck pain.   Daughter at bedside, noticeably frustrated and did not appreciate my re-evaluation at bedside.   Advised that I am present to evaluate whether I should treat the pt's pain which will alleviate the BP vs Elevated BP and to reassess pt.   Pt is comfortable and denied any HA, dizziness, SOB or CP or neck pain. She admits to chronic R shoulder pain but is completely unchanged from baseline and no meds or lidocaine patch has helped in the past.   Chart reviewed - neuro consult appreciated to avoid treating HTN aggressively even in setting of . Pt with chronic severe HTN and daughter confirms that pt's BP is difficult to control at baseline and usually can be in the systolic 200s and 220s.   Daughter concerned with elevated BP as I am and wanted me to give meds to aggressively bring it back down in the systolic 140s-150 range. Advised again against aggressive BP control that would be detrimental to cerebral perfusion pressure s/p CVA.  Daughter states that pt is usually on Procardia Xl 30mg at home. will restart procardia XL 30mg x 1 dose now.   Of note, pt was given Dexamethasone 4mg IV earlier which may contribute to her elevated BP.   Daughter wants me to give pt 4mg Dilaudid for her shoulder pain, I advised against this as her pain is no different than her baseline and pt is comfortable.

## 2018-10-07 NOTE — PROGRESS NOTE ADULT - SUBJECTIVE AND OBJECTIVE BOX
Patient is a 89y old  Female who presents with a chief complaint of Weakness (07 Oct 2018 10:40)      Patient seen and examined at bedside.    ALLERGIES:  ACE inhibitors (Other)  hydrALAZINE (Other)    MEDICATIONS:  acetaminophen   Tablet .. 650 milliGRAM(s) Oral every 6 hours PRN  aspirin  chewable 81 milliGRAM(s) Oral daily  atorvastatin 40 milliGRAM(s) Oral at bedtime  cloNIDine Patch 0.1 mG/24Hr(s) 1 patch Transdermal every 7 days  docusate sodium 100 milliGRAM(s) Oral two times a day  heparin  Injectable 5000 Unit(s) SubCutaneous every 12 hours  influenza   Vaccine 0.5 milliLiter(s) IntraMuscular once  losartan 50 milliGRAM(s) Oral every 12 hours  metoprolol succinate ER 50 milliGRAM(s) Oral every 12 hours  pantoprazole    Tablet 40 milliGRAM(s) Oral daily    Vital Signs Last 24 Hrs  T(F): 97.5 (07 Oct 2018 10:08), Max: 97.8 (06 Oct 2018 19:30)  HR: 55 (07 Oct 2018 10:08) (55 - 69)  BP: 180/53 (07 Oct 2018 10:08) (180/53 - 204/67)  RR: 18 (07 Oct 2018 10:08) (15 - 18)  SpO2: 94% (07 Oct 2018 10:08) (94% - 98%)  I&O's Summary    06 Oct 2018 07:01  -  07 Oct 2018 07:00  --------------------------------------------------------  IN: 440 mL / OUT: 0 mL / NET: 440 mL        PHYSICAL EXAM:  General: NAD, A/O x 2  ENT: MMM  Neck: Supple, No JVD  Lungs: Clear to auscultation bilaterally  Cardio: RRR, S1/S2, No murmurs  Abdomen: Soft, Nontender, Nondistended; Bowel sounds present  Extremities: No cyanosis, No edema  neuro: left hemiparesis     LABS:                        11.4   7.0   )-----------( 184      ( 07 Oct 2018 05:32 )             35.8     10-07    133  |  99  |  36  ----------------------------<  109  3.6   |  25  |  1.41    Ca    9.3      07 Oct 2018 05:32  Phos  3.2     10-07  Mg     1.9     10-07    TPro  9.0  /  Alb  3.9  /  TBili  0.4  /  DBili  x   /  AST  24  /  ALT  11  /  AlkPhos  50  10-05    eGFR if Non African American: 32 mL/min/1.73M2 (10-07-18 @ 05:32)  eGFR if African American: 37 mL/min/1.73M2 (10-07-18 @ 05:32)    PT/INR - ( 05 Oct 2018 10:45 )   PT: 11.1 sec;   INR: 1.00 ratio         PTT - ( 05 Oct 2018 10:45 )  PTT:23.0 sec        10-06 Chol 185 mg/dL LDL 77 mg/dL HDL 92 mg/dL Trig 81 mg/dL        CAPILLARY BLOOD GLUCOSE        10-06 [id number] 5.7          RADIOLOGY & ADDITIONAL TESTS:    Care Discussed with Consultants/Other Providers:

## 2018-10-07 NOTE — CONSULT NOTE ADULT - PROBLEM SELECTOR RECOMMENDATION 9
D/w Dr. Ibanez, neurology consultatant.  Carotid duplex, telemetry, echo.  Statin.  Consider event recorder or ILR.  Poor candidate for SENA

## 2018-10-07 NOTE — CONSULT NOTE ADULT - SUBJECTIVE AND OBJECTIVE BOX
CHIEF COMPLAINT:  Patient is a 89y old  Female who presents with a chief complaint of Weakness (07 Oct 2018 10:58)    HPI:  89 year old female with PMH HTN and AVR presented to ED at [facility] today via EMS.  Per , patient developed left arm weakness and left facial droop at about 9am yesterday, at that time they did not seek medical intervention.  This morning patient was unsteady in bathroom and  assisted her to the floor.  Called EMS to help with transfer out of bathroom, noted to be hypertensive by EMS and brought to ED against husbands wishes.  In ED SBP's found to be greater than 200.  Denies fever, chills, chest pain, shortness of breath, abdominal pain, lightheadedness, dizziness, headache, visual changes, tremor, syncope. (05 Oct 2018 13:31)      PMH:   Hypertension, unspecified type      PSH:   History of total hip replacement, right  S/p bilateral carotid endarterectomy  S/P AVR  No significant past surgical history        FAMILY HISTORY:  No pertinent family history in first degree relatives      SOCIAL HISTORY:  Smoking:  not currently  Alcohol: social      ALLERGIES:  ACE inhibitors (Other)  hydrALAZINE (Other)      Home Medications:  Norvasc 2.5 mg oral tablet: orally every 12 hours (05 Oct 2018 14:12)  Procardia XL 30 mg oral tablet, extended release: 1 tab(s) orally once a day (05 Oct 2018 14:11)  Toprol-XL 50 mg oral tablet, extended release: orally every 12 hours (05 Oct 2018 14:11)  valsartan 160 mg oral tablet: 1 tab(s) orally every 12 hours (05 Oct 2018 14:10)      MEDICATIONS:  acetaminophen   Tablet .. 650 milliGRAM(s) Oral every 6 hours PRN  aspirin  chewable 81 milliGRAM(s) Oral daily  atorvastatin 40 milliGRAM(s) Oral at bedtime  cloNIDine Patch 0.1 mG/24Hr(s) 1 patch Transdermal every 7 days  docusate sodium 100 milliGRAM(s) Oral two times a day  heparin  Injectable 5000 Unit(s) SubCutaneous every 12 hours  influenza   Vaccine 0.5 milliLiter(s) IntraMuscular once  losartan 50 milliGRAM(s) Oral every 12 hours  metoprolol succinate ER 50 milliGRAM(s) Oral every 12 hours  pantoprazole    Tablet 40 milliGRAM(s) Oral daily      REVIEW OF SYSTEMS:  CONSTITUTIONAL: No fever, weight loss, or fatigue  EYES: No eye pain, visual disturbances, or discharge  ENMT:  No difficulty hearing, tinnitus, vertigo; No sinus or throat pain  NECK: No pain or stiffness  BREASTS: No pain, masses, or nipple discharge  RESPIRATORY: No cough, wheezing, chills or hemoptysis; No shortness of breath  CARDIOVASCULAR: No chest pain, palpitations, dizziness, or leg swelling  GASTROINTESTINAL: No abdominal or epigastric pain. No nausea, vomiting, or hematemesis; No diarrhea or constipation. No melena or hematochezia.  GENITOURINARY: No dysuria, frequency, hematuria, or incontinence  NEUROLOGICAL:Weakness, as per HPI  SKIN: No itching, burning, rashes, or lesions   LYMPH NODES: No enlarged glands  ENDOCRINE: No heat or cold intolerance; No hair loss  MUSCULOSKELETAL: No joint pain or swelling; No muscle, back, or extremity pain  PSYCHIATRIC: No depression, anxiety, mood swings, or difficulty sleeping  HEME/LYMPH: No easy bruising, or bleeding gums  ALLERGY AND IMMUNOLOGIC: No hives or eczema    PHYSICAL EXAM:  T(C): 36.4 (10-07-18 @ 10:08), Max: 36.6 (10-06-18 @ 19:30)  HR: 55 (10-07-18 @ 10:08) (55 - 69)  BP: 180/53 (10-07-18 @ 10:08) (180/53 - 204/67)  RR: 18 (10-07-18 @ 10:08) (15 - 18)  SpO2: 94% (10-07-18 @ 10:08) (94% - 98%)  Wt(kg): --    GENERAL: NAD, well-groomed, well-developed  HEAD:  Atraumatic, Normocephalic  EYES: EOMI, conjunctiva and sclera clear  ENT: Moist mucous membranes,  NECK: Supple, No JVD, no bruits Neck scars noted  CHEST/LUNG: Clear to ausculation and percussion bilaterally; No rales, rhonchi, wheezing, or rubs  HEART: Regular rate and rhythm; No murmurs, rubs, or gallops PMI non displaced.  ABDOMEN: Soft, Nontender, Nondistended; Bowel sounds present  EXTREMITIES:  Peripheral Pulses, No clubbing, cyanosis, mild ankle edema    NERVOUS SYSTEM:  Alert weakness left     Cardiovascular Diagnostic Testing:  ECG:  < from: 12 Lead ECG (10.05.18 @ 10:58) >  Ventricular Rate 66 BPM    Atrial Rate 66 BPM    P-R Interval 180 ms    QRS Duration 90 ms    Q-T Interval 452 ms    QTC Calculation(Bezet) 473 ms    P Axis 80 degrees    R Axis 57 degrees    T Axis 101 degrees    Diagnosis Line Normal sinus rhythm  LVH , nstwc  Borderline ECG  No previous ECGs available  Confirmed by PALMIRA PALM, SOTERO MEJIAS () on 10/6/2018 8:50:38 AM    < end of copied text >      LABS:                        11.4   7.0   )-----------( 184      ( 07 Oct 2018 05:32 )             35.8     10-    133<L>  |  99  |  36<H>  ----------------------------<  109<H>  3.6   |  25  |  1.41<H>    Ca    9.3      07 Oct 2018 05:32  Phos  3.2     10-07  Mg     1.9     10-07      Total Cholesterol: 185  LDL: 77  HDL: 92  T    Hemoglobin A1C, Whole Blood: 5.7 % (10-06 @ 05:30)        Telemetry:    sinus    IMAGING:    < from: CT Head No Cont (10.06.18 @ 11:04) >    EXAM:  CT BRAIN      PROCEDURE DATE:  10/06/2018        INTERPRETATION:  .    CLINICAL INFORMATION: Cerebrovascular accident.    TECHNIQUE: Multiple axial CT images of the head were obtained without   contrast. Sagittal and coronal reconstructed images were acquired from   the source data.    COMPARISON: Prior head CT examination from 10/5/2018.    FINDINGS: An evolving transcortical infarct is notable within the high   right paramedian parietal lobe. Evolving infarct is also notable within   the right occipital lobe.    There is a chronic infarct within the right posterior frontal lobe. A   chronic infarct is also noted within the left cerebellar hemisphere.   There is also an area of encephalomalacia and gliosis within the right   basal frontal lobe related to prior insult.    There is diffuse cerebral volume loss with prominence of the sulci,   fissures, and cisternal spaces which is normal for the patient's age.   There is mild periventricular white matter hypoattenuation statistically   compatible with microvascular changes given calcific atherosclerotic   disease of the intracranial arteries.    There is no acute intracranial hemorrhage, shift of the midline   structures, or hydrocephalus.    The paranasal sinuses and mastoid air cells are clear. The calvarium   appears intact. Bilateral senile scleral plaques are noted. There is   evidence of bilateral cataract removal.     IMPRESSION: Evolving acute/subacute infarcts within the high right   paramedian parietal and right occipital lobes. No hemorrhagic   transformation.    Other chronic findings, as discussed    < end of copied text >  < from: Xray Chest 1 View- PORTABLE-Routine (10.05.18 @ 11:13) >  EXAM:  XR CHEST PORTABLE ROUTINE 1V      PROCEDURE DATE:  10/05/2018        INTERPRETATION:  History: Slurred speech and left-sided weakness.   Admission chest    Technique:  AP portable    Comparisons:  none    Findings:     No acute infiltrates, congestion or pleural effusions  .      The pulmonary vasculature and aorta are normal for age. Heart size is   unremarkable.     Status post median sternotomy and aortic valve replacement. Multiple   surgical clips clips are present in the right and left neck.    Impression: No acute pulmonary disease.    < end of copied text >

## 2018-10-07 NOTE — PROGRESS NOTE ADULT - SUBJECTIVE AND OBJECTIVE BOX
Neurology Progress Note    Subjective & Objective:  Improved.  Able to pic up images in the left visual field though left neglect persists with right head and eye deviation.  Also left VII and hemiparesis persists with reduced pin and DSS errors to touch.          Vital Signs Last 24 Hrs  T(C): 36.4 (07 Oct 2018 10:08), Max: 36.6 (06 Oct 2018 19:30)  T(F): 97.5 (07 Oct 2018 10:08), Max: 97.8 (06 Oct 2018 19:30)  HR: 55 (07 Oct 2018 10:08) (55 - 69)  BP: 180/53 (07 Oct 2018 10:08) (180/53 - 204/67)  BP(mean): --  RR: 18 (07 Oct 2018 10:08) (15 - 18)  SpO2: 94% (07 Oct 2018 10:08) (94% - 98%)                          total cholesterol 185 mg/dL  HDL 92 mg/dL  LDL 77 mg/dL                 Radiology: CT< from: CT Head No Cont (10.06.18 @ 11:04) >  CLINICAL INFORMATION: Cerebrovascular accident.    TECHNIQUE: Multiple axial CT images of the head were obtained without   contrast. Sagittal and coronal reconstructed images were acquired from   the source data.    COMPARISON: Prior head CT examination from 10/5/2018.    FINDINGS: An evolving transcortical infarct is notable within the high   right paramedian parietal lobe. Evolving infarct is also notable within   the right occipital lobe.    There is a chronic infarct within the right posterior frontal lobe. A   chronic infarct is also noted within the left cerebellar hemisphere.   There is also an area of encephalomalacia and gliosis within the right   basal frontal lobe related to prior insult.    There is diffuse cerebral volume loss with prominence of the sulci,   fissures, and cisternal spaces which is normal for the patient's age.   There is mild periventricular white matter hypoattenuation statistically   compatible with microvascular changes given calcific atherosclerotic   disease of the intracranial arteries.    There is no acute intracranial hemorrhage, shift of the midline   structures, or hydrocephalus.    The paranasal sinuses and mastoid air cells are clear. The calvarium   appears intact. Bilateral senile scleral plaques are noted. There is   evidence of bilateral cataract removal.     IMPRESSION: Evolving acute/subacute infarcts within the high right   paramedian parietal and right occipital lobes. No hemorrhagic     < end of copied text >                             MEDICATIONS  (STANDING):  aspirin  chewable 81 milliGRAM(s) Oral daily  atorvastatin 40 milliGRAM(s) Oral at bedtime  cloNIDine Patch 0.1 mG/24Hr(s) 1 patch Transdermal every 7 days  docusate sodium 100 milliGRAM(s) Oral two times a day  heparin  Injectable 5000 Unit(s) SubCutaneous every 12 hours  influenza   Vaccine 0.5 milliLiter(s) IntraMuscular once  losartan 50 milliGRAM(s) Oral every 12 hours  metoprolol succinate ER 50 milliGRAM(s) Oral every 12 hours  pantoprazole    Tablet 40 milliGRAM(s) Oral daily    MEDICATIONS  (PRN):  acetaminophen   Tablet .. 650 milliGRAM(s) Oral every 6 hours PRN Mild Pain (1 - 3) Neurology Progress Note    Subjective & Objective:  Improved.  Able to pic up images in the left visual field though left neglect persists with right head and eye deviation.  Also left VII and hemiparesis persists with reduced pin and DSS errors to touch.    Repeat head CT yesterday reveals the right MCA infarct and there is also an evolving right occipital infarct as well.          Vital Signs Last 24 Hrs  T(C): 36.4 (07 Oct 2018 10:08), Max: 36.6 (06 Oct 2018 19:30)  T(F): 97.5 (07 Oct 2018 10:08), Max: 97.8 (06 Oct 2018 19:30)  HR: 55 (07 Oct 2018 10:08) (55 - 69)  BP: 180/53 (07 Oct 2018 10:08) (180/53 - 204/67)  BP(mean): --  RR: 18 (07 Oct 2018 10:08) (15 - 18)  SpO2: 94% (07 Oct 2018 10:08) (94% - 98%)                          total cholesterol 185 mg/dL  HDL 92 mg/dL  LDL 77 mg/dL                 Radiology: CT< from: CT Head No Cont (10.06.18 @ 11:04) >  CLINICAL INFORMATION: Cerebrovascular accident.    TECHNIQUE: Multiple axial CT images of the head were obtained without   contrast. Sagittal and coronal reconstructed images were acquired from   the source data.    COMPARISON: Prior head CT examination from 10/5/2018.    FINDINGS: An evolving transcortical infarct is notable within the high   right paramedian parietal lobe. Evolving infarct is also notable within   the right occipital lobe.    There is a chronic infarct within the right posterior frontal lobe. A   chronic infarct is also noted within the left cerebellar hemisphere.   There is also an area of encephalomalacia and gliosis within the right   basal frontal lobe related to prior insult.    There is diffuse cerebral volume loss with prominence of the sulci,   fissures, and cisternal spaces which is normal for the patient's age.   There is mild periventricular white matter hypoattenuation statistically   compatible with microvascular changes given calcific atherosclerotic   disease of the intracranial arteries.    There is no acute intracranial hemorrhage, shift of the midline   structures, or hydrocephalus.    The paranasal sinuses and mastoid air cells are clear. The calvarium   appears intact. Bilateral senile scleral plaques are noted. There is   evidence of bilateral cataract removal.     IMPRESSION: Evolving acute/subacute infarcts within the high right   paramedian parietal and right occipital lobes. No hemorrhagic     < end of copied text >                             MEDICATIONS  (STANDING):  aspirin  chewable 81 milliGRAM(s) Oral daily  atorvastatin 40 milliGRAM(s) Oral at bedtime  cloNIDine Patch 0.1 mG/24Hr(s) 1 patch Transdermal every 7 days  docusate sodium 100 milliGRAM(s) Oral two times a day  heparin  Injectable 5000 Unit(s) SubCutaneous every 12 hours  influenza   Vaccine 0.5 milliLiter(s) IntraMuscular once  losartan 50 milliGRAM(s) Oral every 12 hours  metoprolol succinate ER 50 milliGRAM(s) Oral every 12 hours  pantoprazole    Tablet 40 milliGRAM(s) Oral daily    MEDICATIONS  (PRN):  acetaminophen   Tablet .. 650 milliGRAM(s) Oral every 6 hours PRN Mild Pain (1 - 3)

## 2018-10-07 NOTE — PROGRESS NOTE ADULT - ASSESSMENT
S/P right hemispheric infarct with signs of right cerebral dysfunction in setting of HTN, prior carotid endarterectomy and AVR.  No evidence for A Fib.    REC:  medications as ordered, speech, PT/OT, evaluate for rehab, telemetry to r/o A Fib, check echocardiogram, medications reviewed, cautious BP control as you are doing. S/P right hemispheric infarcts in the right parietal and occipital lobes seen on the most recent CT.  Therefore emboli should be considered in this setting though no evidence for A Fib.    REC:  medications as ordered, speech, PT/OT, evaluate for rehab, telemetry to r/o A Fib, check echocardiogram cautious BP control as you are doing, consider cardiac consult to r/o cardioembolic event.

## 2018-10-07 NOTE — PROGRESS NOTE ADULT - ASSESSMENT
Physical Examination:  GENERAL:               Alert, Oriented, No acute distress.    HEENT:                    Pupils equal, reactive to light does not pass midline to the left  Symmetric. No JVD, Moist MM  PULM:                     Bilateral air entry, Clear to auscultation bilaterally, no significant sputum production, No Rales, No Rhonchi, No Wheezing  CVS:                         S1, S2,  +Murmur  ABD:                        Soft, nondistended, nontender, normoactive bowel sounds,   EXT:                         No edema, nontender, No Cyanosis or Clubbing   Vascular:                Warm Extremities, Normal Capillary refill, Normal Distal Pulses  SKIN:                       Warm and well perfused, no rashes noted.   NEURO:                  Alert, oriented, interactive, Left HP with 2/5 LUE - LLE 4/5 Right side 5/5, left hemineglect  PSYC:                      Calm, + Insight.        Assessment  Acute CVA with Left HP , not a candidate for tpa on presentation  uncontrolled bp, HTN  VHD s/p AVR    Plan   BP control keep < 180, BP control   asa, statin  Echo  US Carotids   neuro f/u  supportive care .    PMD:			Kita	                   Notified(Date): 9/5 attempt by Dr. Escalante in ED - but on vacation  Family Updated: 	DTR	     bedside                            Date: 9/7      Sedation & Analgesia:	on hold  Diet/Nutrition:		as tolerated  GI PPx:			PPI    DVT Ppx:		Heparin    Activity:		               OOB PT  Head of Bed:               35-45 deg  Glycemic Control:        Insulin    Lines:  PIV    Restraints were deemed necessary to prevent removal of life-sustaining devices [  ] YES   [x    ]  NO    Disposition: continue care on tele    Goals of Care:Full Code. Physical Examination:  GENERAL:               Alert, Oriented, No acute distress.    HEENT:                    Pupils equal, reactive to light does not pass midline to the left  Symmetric. No JVD, Moist MM  PULM:                     Bilateral air entry, Clear to auscultation bilaterally, no significant sputum production, No Rales, No Rhonchi, No Wheezing  CVS:                         S1, S2,  +Murmur  ABD:                        Soft, nondistended, nontender, normoactive bowel sounds,   EXT:                         No edema, nontender, No Cyanosis or Clubbing   Vascular:                Warm Extremities, Normal Capillary refill, Normal Distal Pulses  SKIN:                       Warm and well perfused, no rashes noted.   NEURO:                  Alert, oriented, interactive, Left HP with 2/5 LUE - LLE 4/5 Right side 5/5, left hemineglect  PSYC:                      Calm, + Insight.        Assessment  Acute CVA with Left HP , not a candidate for tpa on presentation  uncontrolled bp, HTN  VHD s/p AVR    Plan   BP control keep < 180, BP control   asa, statin  Echo  US Carotids   neuro f/u  supportive care .    PMD:			Kita	                   Notified(Date): 10/5 attempt by Dr. Escalante in ED - but on vacation  Family Updated: 	DTR	     bedside                            Date: 10/7      Sedation & Analgesia:	on hold  Diet/Nutrition:		as tolerated  GI PPx:			PPI    DVT Ppx:		Heparin    Activity:		               OOB PT  Head of Bed:               35-45 deg  Glycemic Control:        Insulin    Lines:  PIV    Restraints were deemed necessary to prevent removal of life-sustaining devices [  ] YES   [x    ]  NO    Disposition: continue care on tele    Goals of Care:Full Code.

## 2018-10-07 NOTE — PHYSICAL THERAPY INITIAL EVALUATION ADULT - PATIENT/FAMILY/SIGNIFICANT OTHER GOALS STATEMENT, PT EVAL
Patient's daughter would like patient to go to acute rehab. 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

## 2018-10-07 NOTE — PROGRESS NOTE ADULT - PROBLEM SELECTOR PLAN 1
acute right mca and right occiptal cva cw asa statin  PT CONSULT  echo done awaiting results ck carotid doppler lipid panel complete  speech and swallow consult fu neuro  place cardio consult r/o cardio embolic disease cw tele monitoring

## 2018-10-07 NOTE — PHYSICAL THERAPY INITIAL EVALUATION ADULT - GAIT TRAINING, PT EVAL
In 1-3 sessions, patient will ambulate 50 feet using least restrictive assistive device with mod assist

## 2018-10-07 NOTE — PHYSICAL THERAPY INITIAL EVALUATION ADULT - RANGE OF MOTION EXAMINATION, REHAB EVAL
right shoulder not assessed due to painful arthritic right shoulder, elbow and wrist WFL, left UE shoulder to 90 degrees, elbow and wrist WFL

## 2018-10-08 LAB
ANION GAP SERPL CALC-SCNC: 8 MMOL/L — SIGNIFICANT CHANGE UP (ref 5–17)
BUN SERPL-MCNC: 39 MG/DL — HIGH (ref 7–23)
CALCIUM SERPL-MCNC: 9 MG/DL — SIGNIFICANT CHANGE UP (ref 8.4–10.5)
CHLORIDE SERPL-SCNC: 96 MMOL/L — SIGNIFICANT CHANGE UP (ref 96–108)
CO2 SERPL-SCNC: 25 MMOL/L — SIGNIFICANT CHANGE UP (ref 22–31)
CREAT SERPL-MCNC: 1.5 MG/DL — HIGH (ref 0.5–1.3)
GLUCOSE SERPL-MCNC: 103 MG/DL — HIGH (ref 70–99)
POTASSIUM SERPL-MCNC: 4.1 MMOL/L — SIGNIFICANT CHANGE UP (ref 3.5–5.3)
POTASSIUM SERPL-SCNC: 4.1 MMOL/L — SIGNIFICANT CHANGE UP (ref 3.5–5.3)
SODIUM SERPL-SCNC: 129 MMOL/L — LOW (ref 135–145)

## 2018-10-08 PROCEDURE — 99233 SBSQ HOSP IP/OBS HIGH 50: CPT | Mod: GC

## 2018-10-08 PROCEDURE — 70450 CT HEAD/BRAIN W/O DYE: CPT | Mod: 26

## 2018-10-08 RX ORDER — HYDRALAZINE HCL 50 MG
10 TABLET ORAL ONCE
Qty: 0 | Refills: 0 | Status: COMPLETED | OUTPATIENT
Start: 2018-10-08 | End: 2018-10-08

## 2018-10-08 RX ORDER — ONDANSETRON 8 MG/1
4 TABLET, FILM COATED ORAL ONCE
Qty: 0 | Refills: 0 | Status: COMPLETED | OUTPATIENT
Start: 2018-10-08 | End: 2018-10-08

## 2018-10-08 RX ORDER — TRAMADOL HYDROCHLORIDE 50 MG/1
25 TABLET ORAL ONCE
Qty: 0 | Refills: 0 | Status: DISCONTINUED | OUTPATIENT
Start: 2018-10-08 | End: 2018-10-08

## 2018-10-08 RX ORDER — METOPROLOL TARTRATE 50 MG
5 TABLET ORAL ONCE
Qty: 0 | Refills: 0 | Status: COMPLETED | OUTPATIENT
Start: 2018-10-08 | End: 2018-10-08

## 2018-10-08 RX ADMIN — Medication 81 MILLIGRAM(S): at 12:15

## 2018-10-08 RX ADMIN — LOSARTAN POTASSIUM 50 MILLIGRAM(S): 100 TABLET, FILM COATED ORAL at 17:34

## 2018-10-08 RX ADMIN — TRAMADOL HYDROCHLORIDE 25 MILLIGRAM(S): 50 TABLET ORAL at 18:36

## 2018-10-08 RX ADMIN — Medication 50 MILLIGRAM(S): at 17:34

## 2018-10-08 RX ADMIN — HEPARIN SODIUM 5000 UNIT(S): 5000 INJECTION INTRAVENOUS; SUBCUTANEOUS at 05:38

## 2018-10-08 RX ADMIN — LOSARTAN POTASSIUM 50 MILLIGRAM(S): 100 TABLET, FILM COATED ORAL at 05:38

## 2018-10-08 RX ADMIN — Medication 650 MILLIGRAM(S): at 07:31

## 2018-10-08 RX ADMIN — TRAMADOL HYDROCHLORIDE 25 MILLIGRAM(S): 50 TABLET ORAL at 19:00

## 2018-10-08 RX ADMIN — ONDANSETRON 4 MILLIGRAM(S): 8 TABLET, FILM COATED ORAL at 22:48

## 2018-10-08 RX ADMIN — Medication 100 MILLIGRAM(S): at 05:38

## 2018-10-08 RX ADMIN — HEPARIN SODIUM 5000 UNIT(S): 5000 INJECTION INTRAVENOUS; SUBCUTANEOUS at 17:34

## 2018-10-08 RX ADMIN — Medication 100 MILLIGRAM(S): at 17:34

## 2018-10-08 RX ADMIN — Medication 650 MILLIGRAM(S): at 08:05

## 2018-10-08 RX ADMIN — Medication 50 MILLIGRAM(S): at 05:37

## 2018-10-08 RX ADMIN — PANTOPRAZOLE SODIUM 40 MILLIGRAM(S): 20 TABLET, DELAYED RELEASE ORAL at 12:15

## 2018-10-08 NOTE — PROGRESS NOTE ADULT - PROBLEM SELECTOR PLAN 1
acute right mca and right occipital cva cw asa statin  PT CONSULT  echo done awaiting results ck carotid doppler lipid panel complete  speech and swallow consult fu neuro  place cardio consult r/o cardio embolic disease cw tele monitoring

## 2018-10-08 NOTE — PROGRESS NOTE ADULT - ASSESSMENT
Physical Examination:  GENERAL:               Alert, Oriented, No acute distress.    HEENT:                    Pupils equal, reactive to light does not pass midline to the left  Symmetric. No JVD, Moist MM  PULM:                     Bilateral air entry, Clear to auscultation bilaterally, no significant sputum production, No Rales, No Rhonchi, No Wheezing  CVS:                         S1, S2,  +Murmur  ABD:                        Soft, nondistended, nontender, normoactive bowel sounds,   EXT:                         No edema, nontender, No Cyanosis or Clubbing   Vascular:                Warm Extremities, Normal Capillary refill, Normal Distal Pulses  SKIN:                       Warm and well perfused, no rashes noted.   NEURO:                  Alert, oriented, interactive, Left HP with 2/5 LUE - LLE 4/5 Right side 5/5, left hemineglect  PSYC:                      Calm, + Insight.        Assessment  Acute CVA with Left HP , not a candidate for tpa on presentation r/o cardio embolic as multiple locations on ct.  uncontrolled bp, HTN  VHD s/p AVR      Plan   BP control keep < 180, BP control   asa, statin  Echo  US Carotids   neuro f/u  supportive care .  Family Updated: 	DTR	     bedside                            Date: 9/8      Sedation & Analgesia:	on hold  Diet/Nutrition:		as tolerated  GI PPx:			PPI    DVT Ppx:		Heparin    Disposition: continue care on tele    Goals of Care:Full Code.    No active ccm issues reconsult as needed

## 2018-10-08 NOTE — PROGRESS NOTE ADULT - PROBLEM SELECTOR PLAN 1
Acute right MCA and right occiptal CVA-continue with Aspirin and statin  OT and PT consulted  Echo showed EF 55-60%  Carotid doppler showed no significant stenosis   lipid panel complete  speech and swallow consult fu neuro  Cardio following  Continue with tele monitoring r/o embolic disease Acute right MCA and right occipital CVA-continue with Aspirin and statin  OT and PT consulted  Echo showed EF 55-60%  Carotid doppler showed no significant stenosis   lipid panel complete  speech and swallow consult fu neuro  Cardio following  Continue with tele monitoring r/o embolic disease

## 2018-10-08 NOTE — PROGRESS NOTE ADULT - SUBJECTIVE AND OBJECTIVE BOX
Patient is a 89y old  Female who presents with a chief complaint of Weakness      Patient seen and examined at bedside. Lying comfortable in bed, asleep but easily arousable. No apparent distress. Daughter at bedside.    ALLERGIES:  ACE inhibitors (Other)  hydrALAZINE (Other)    MEDICATIONS  (STANDING):  aspirin  chewable 81 milliGRAM(s) Oral daily  atorvastatin 40 milliGRAM(s) Oral at bedtime  cloNIDine Patch 0.1 mG/24Hr(s) 1 patch Transdermal every 7 days  docusate sodium 100 milliGRAM(s) Oral two times a day  heparin  Injectable 5000 Unit(s) SubCutaneous every 12 hours  influenza   Vaccine 0.5 milliLiter(s) IntraMuscular once  losartan 50 milliGRAM(s) Oral every 12 hours  metoprolol succinate ER 50 milliGRAM(s) Oral every 12 hours  pantoprazole    Tablet 40 milliGRAM(s) Oral daily    MEDICATIONS  (PRN):  acetaminophen   Tablet .. 650 milliGRAM(s) Oral every 6 hours PRN Mild Pain (1 - 3)    Vital Signs Last 24 Hrs  T(F): 97.4 (08 Oct 2018 05:28), Max: 97.8 (07 Oct 2018 12:41)  HR: 51 (08 Oct 2018 05:28) (51 - 56)  BP: 189/57 (08 Oct 2018 05:28) (151/59 - 189/57)  RR: 15 (08 Oct 2018 05:28) (15 - 16)  SpO2: 99% (08 Oct 2018 05:28) (96% - 99%)  I&O's Summary    07 Oct 2018 07:01  -  08 Oct 2018 07:00  --------------------------------------------------------  IN: 300 mL / OUT: 0 mL / NET: 300 mL        PHYSICAL EXAM:  General: NAD, A/O x 3  Neck: Supple, No JVD  Lungs: Clear to auscultation bilaterally  Cardio: RRR, S1/S2, No murmurs  Abdomen: Soft, Nontender, Nondistended; Bowel sounds present  Extremities: No calf tenderness, No pitting edema    LABS:                        11.4   7.0   )-----------( 184      ( 07 Oct 2018 05:32 )             35.8       10-08    129  |  96  |  39  ----------------------------<  103  4.1   |  25  |  1.50    Ca    9.0      08 Oct 2018 06:00  Phos  3.2     10-07  Mg     1.9     10-07    TPro  9.0  /  Alb  3.9  /  TBili  0.4  /  DBili  x   /  AST  24  /  ALT  11  /  AlkPhos  50  10-05     eGFR if Non African American: 30 mL/min/1.73M2 (10-08-18 @ 06:00)  eGFR if African American: 34 mL/min/1.73M2 (10-08-18 @ 06:00)    PT/INR - ( 05 Oct 2018 10:45 )   PT: 11.1 sec;   INR: 1.00 ratio         PTT - ( 05 Oct 2018 10:45 )  PTT:23.0 sec         10-06 Chol 185 mg/dL LDL 77 mg/dL HDL 92 mg/dL Trig 81 mg/dL    10-06 [id number] 5.7      RADIOLOGY & ADDITIONAL TESTS:  < from: US Duplex Carotid Arteries Complete, Bilateral (10.07.18 @ 12:54) >    No evidence of hemodynamically significant stenosis by velocity   measurements.    < from: CT Head No Cont (10.06.18 @ 11:04) >  IMPRESSION: Evolving acute/subacute infarcts within the high right   paramedian parietal and right occipital lobes. No hemorrhagic   transformation.    Care Discussed with Consultants/Other Providers: Dr. Petit Patient is a 89y old  Female who presents with a chief complaint of Weakness      Patient seen and examined at bedside. Lying comfortable in bed, asleep but easily arousable. No apparent distress. Daughter at bedside.    ALLERGIES:  ACE inhibitors (Other)  hydrALAZINE (Other)    MEDICATIONS  (STANDING):  aspirin  chewable 81 milliGRAM(s) Oral daily  atorvastatin 40 milliGRAM(s) Oral at bedtime  cloNIDine Patch 0.1 mG/24Hr(s) 1 patch Transdermal every 7 days  docusate sodium 100 milliGRAM(s) Oral two times a day  heparin  Injectable 5000 Unit(s) SubCutaneous every 12 hours  influenza   Vaccine 0.5 milliLiter(s) IntraMuscular once  losartan 50 milliGRAM(s) Oral every 12 hours  metoprolol succinate ER 50 milliGRAM(s) Oral every 12 hours  pantoprazole    Tablet 40 milliGRAM(s) Oral daily    MEDICATIONS  (PRN):  acetaminophen   Tablet .. 650 milliGRAM(s) Oral every 6 hours PRN Mild Pain (1 - 3)    Vital Signs Last 24 Hrs  T(F): 97.4 (08 Oct 2018 05:28), Max: 97.8 (07 Oct 2018 12:41)  HR: 51 (08 Oct 2018 05:28) (51 - 56)  BP: 189/57 (08 Oct 2018 05:28) (151/59 - 189/57)  RR: 15 (08 Oct 2018 05:28) (15 - 16)  SpO2: 99% (08 Oct 2018 05:28) (96% - 99%)  I&O's Summary    07 Oct 2018 07:01  -  08 Oct 2018 07:00  --------------------------------------------------------  IN: 300 mL / OUT: 0 mL / NET: 300 mL        PHYSICAL EXAM:  General: NAD, A/O x 3  Neck: Supple, No JVD  Lungs: Clear to auscultation bilaterally  Cardio: RRR, S1/S2, No murmurs  Abdomen: Soft, Nontender, Nondistended; Bowel sounds present  Extremities: No calf tenderness, No pitting edema, LUE weakness    LABS:                        11.4   7.0   )-----------( 184      ( 07 Oct 2018 05:32 )             35.8       10-08    129  |  96  |  39  ----------------------------<  103  4.1   |  25  |  1.50    Ca    9.0      08 Oct 2018 06:00  Phos  3.2     10-07  Mg     1.9     10-07    TPro  9.0  /  Alb  3.9  /  TBili  0.4  /  DBili  x   /  AST  24  /  ALT  11  /  AlkPhos  50  10-05     eGFR if Non African American: 30 mL/min/1.73M2 (10-08-18 @ 06:00)  eGFR if African American: 34 mL/min/1.73M2 (10-08-18 @ 06:00)    PT/INR - ( 05 Oct 2018 10:45 )   PT: 11.1 sec;   INR: 1.00 ratio         PTT - ( 05 Oct 2018 10:45 )  PTT:23.0 sec         10-06 Chol 185 mg/dL LDL 77 mg/dL HDL 92 mg/dL Trig 81 mg/dL    10-06 [id number] 5.7      RADIOLOGY & ADDITIONAL TESTS:  < from: US Duplex Carotid Arteries Complete, Bilateral (10.07.18 @ 12:54) >    No evidence of hemodynamically significant stenosis by velocity   measurements.    < from: CT Head No Cont (10.06.18 @ 11:04) >  IMPRESSION: Evolving acute/subacute infarcts within the high right   paramedian parietal and right occipital lobes. No hemorrhagic   transformation.    Care Discussed with Consultants/Other Providers: Dr. Petit

## 2018-10-08 NOTE — PROGRESS NOTE ADULT - ASSESSMENT
89 year old female with PMH: HTN, AVR. 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 Pt, OT, and Acute rehab consulted and pending. Elevated blood pressures-essential HTN will continue with current meds no aggressive BP control. Cardiology following, pending carotid duplex and echo. Continue with  tele monitoring r/o cardioembolic disease 89 year old female with PMH: HTN, AVR. 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 Pt, OT, and Acute rehab consulted and pending. Elevated blood pressures-essential HTN will continue with current meds no aggressive BP control. Cardiology following, Carotid duplex and echo completed.

## 2018-10-08 NOTE — PROGRESS NOTE ADULT - SUBJECTIVE AND OBJECTIVE BOX
Follow-up Critical Care Progress Note  Chief Complaint : Other symptom or sign involving musculoskeletal system      BP controlled  comfortable  no cp, sob, pal, n/v  LUE weakness continues      Allergies :ACE inhibitors (Other)  hydrALAZINE (Other)      PAST MEDICAL & SURGICAL HISTORY:  Hypertension, unspecified type  History of total hip replacement, right: 2014 at [facility],  No complications  S/p bilateral carotid endarterectomy: 2008 st [facility], no complications  S/P AVR: 2008 at [facility], Dr. Bermudez.  No complications      Medications:  MEDICATIONS  (STANDING):  aspirin  chewable 81 milliGRAM(s) Oral daily  atorvastatin 40 milliGRAM(s) Oral at bedtime  cloNIDine Patch 0.1 mG/24Hr(s) 1 patch Transdermal every 7 days  docusate sodium 100 milliGRAM(s) Oral two times a day  heparin  Injectable 5000 Unit(s) SubCutaneous every 12 hours  influenza   Vaccine 0.5 milliLiter(s) IntraMuscular once  losartan 50 milliGRAM(s) Oral every 12 hours  metoprolol succinate ER 50 milliGRAM(s) Oral every 12 hours  pantoprazole    Tablet 40 milliGRAM(s) Oral daily    MEDICATIONS  (PRN):  acetaminophen   Tablet .. 650 milliGRAM(s) Oral every 6 hours PRN Mild Pain (1 - 3)      LABS:                        11.4   7.0   )-----------( 184      ( 07 Oct 2018 05:32 )             35.8     10-08    129<L>  |  96  |  39<H>  ----------------------------<  103<H>  4.1   |  25  |  1.50<H>    Ca    9.0      08 Oct 2018 06:00  Phos  3.2     10-07  Mg     1.9     10-07                          CULTURES: (if applicable)          CAPILLARY BLOOD GLUCOSE    < from: CT Head No Cont (10.06.18 @ 11:04) >  IMPRESSION: Evolving acute/subacute infarcts within the high right paramedian parietal and right occipital lobes. No hemorrhagic transformation.    Other chronic findings, as discussed    < end of copied text >      VITALS:  T(C): 36.6 (10-08-18 @ 10:30), Max: 36.6 (10-08-18 @ 10:30)  T(F): 97.9 (10-08-18 @ 10:30), Max: 97.9 (10-08-18 @ 10:30)  HR: 52 (10-08-18 @ 10:30) (51 - 56)  BP: 151/56 (10-08-18 @ 10:30) (151/56 - 189/57)  BP(mean): --  ABP: --  ABP(mean): --  RR: 15 (10-08-18 @ 05:28) (15 - 16)  SpO2: 99% (10-08-18 @ 05:28) (96% - 99%)  CVP(mm Hg): --  CVP(cm H2O): --    Ins and Outs     10-07-18 @ 07:01  -  10-08-18 @ 07:00  --------------------------------------------------------  IN: 535 mL / OUT: 0 mL / NET: 535 mL    10-08-18 @ 07:01  -  10-08-18 @ 14:11  --------------------------------------------------------  IN: 300 mL / OUT: 0 mL / NET: 300 mL        Height (cm): 160.02 (10-05-18 @ 15:13)

## 2018-10-08 NOTE — CONSULT NOTE ADULT - ASSESSMENT
89 yo woman with h/o HTN and arthritis with acute right hemispheric MCA infarct with left hemiparesis, gait and ADL dysfunction, visual field deficits.     Recommend: Continue management per neurology and primary team. Continue to monitor telemetry.   Will await ST and PT evaluations. Will re-evaluate patient after evaluations before making final recommendations for rehab services. 89 yo woman with h/o HTN and arthritis with acute right hemispheric MCA infarct with left hemiparesis, gait and ADL dysfunction, visual field deficits.     Recommend: Continue management per neurology and primary team. Continue to monitor telemetry.   Will await ST and PT evaluations. Will have to f/u with cardiology regarding prolonged hear monitoring and if AC will need to be started. Will re-evaluate patient after evaluations before making final recommendations for rehab services.

## 2018-10-08 NOTE — OCCUPATIONAL THERAPY INITIAL EVALUATION ADULT - PERTINENT HX OF CURRENT PROBLEM, REHAB EVAL
Patient felt left UE weakness and had a left facial droop day before admission. Had an assisted fall in bathroom,  unable to pick patient up. EMS was called

## 2018-10-08 NOTE — CONSULT NOTE ADULT - SUBJECTIVE AND OBJECTIVE BOX
Patient is a 89y old  female who presented to [facility] on 10/5 with a chief complaint of left sided weakness since that preceding morning.  H/o hypertension, bilateral carotid endarterectomy and AVR replacement years ago.  Daughter at bedside reports that patient's BPs have been hard to control over the years and had been on BP medication at home but often her systolic BPs are in the 180-220s. In the ED, her SBP was 220. Out of window for tPA. Initial CT of head did not reveal any abnormalities however on repeat CT on the 6th, evolving acute/subacute infarcts within the high right paramedian parietal and right occipital lobes were noted. No hemorrhagic transformation seen. Neurology and cardiology were consulted. TTE and carotid dopplers without significant findings. No arrythmia found on telemetry monitoring. SBPs have continued to trend around 180 systolically. Catapress patch was applied on 10/6. SBP in the 150s around lunch time per nursing.   PT was unable to see patient due to SBP in the 180s. OT saw patient this am and patient was mod to max assist for bed mobility and transfers. PT will try to re-evaluate patient this afternoon.   PM&R consulted to weigh in on disposition planning.     PAST MEDICAL & SURGICAL HISTORY:  Hypertension, unspecified type  History of total hip replacement, right: 2014 at [facility],  No complications  S/p bilateral carotid endarterectomy: 2008 st [facility], no complications  S/P AVR: 2008 at [facility], Dr. Bermudez.  No complications  Right shoulder arthritis  Left radial nerve palsy after fall in 2017  Left carpel tunnel   macular degeneration       Allergies    ACE inhibitors (Other)  hydrALAZINE (Other)      Social Hx: Lives in  with disabled spouse. Spouse has SCI and in mostly WC bound. Pt will bring spouse clothes and meals, but he is able to bath and dress himself. Patient was independent for all of her ADLs prior to stroke. Pt used a cane for balance. No longer drives due to macular degeneration.     TODAY'S SUBJECTIVE & REVIEW OF SYMPTOMS:  [ x  ] Constitutional WNL  [   ] Cardio WNL  [ x  ] Resp WNL  [ x  ] GI WNL  [ x  ] Heme WNL  [ x  ] Endo WNL  [ x  ] Skin WNL  [   ] MSK WNL  [   ] Neuro WNL  [ x  ] Cognitive WNL  [ x  ] Psych WNL  Patient denies HA, dizziness, N/V/D, abdominal pain, fever, chills. Patient states that she has weakness to left arm and leg and having difficulty with left peripheral vision.  Daughter reports that she is sleepier than she has been since admission, however, per daughter, patient has not slept well in 3 days. Patient easily aroused with verbal stimuli.      Vital Signs Last 24 Hrs  T(C): 36.6 (08 Oct 2018 10:30), Max: 36.6 (08 Oct 2018 10:30)  T(F): 97.9 (08 Oct 2018 10:30), Max: 97.9 (08 Oct 2018 10:30)  HR: 52 (08 Oct 2018 10:30) (51 - 56)  BP: 151/56 (08 Oct 2018 10:30) (151/56 - 189/57)  RR: 15 (08 Oct 2018 05:28) (15 - 16)  SpO2: 99% (08 Oct 2018 05:28) (96% - 99%)      10-08    129<L>  |  96  |  39<H>  ----------------------------<  103<H>  4.1   |  25  |  1.50<H>    Ca    9.0      08 Oct 2018 06:00  Phos  3.2     10-07  Mg     1.9     10-07                            11.4   7.0   )-----------( 184      ( 07 Oct 2018 05:32 )             35.8       < from: CT Head No Cont (10.06.18 @ 11:04) >  EXAM:  CT BRAIN      PROCEDURE DATE:  10/06/2018        INTERPRETATION:  .    CLINICAL INFORMATION: Cerebrovascular accident.    TECHNIQUE: Multiple axial CT images of the head were obtained without   contrast. Sagittal and coronal reconstructed images were acquired from   the source data.    COMPARISON: Prior head CT examination from 10/5/2018.    FINDINGS: An evolving transcortical infarct is notable within the high   right paramedian parietal lobe. Evolving infarct is also notable within   the right occipital lobe.    There is a chronic infarct within the right posterior frontal lobe. A   chronic infarct is also noted within the left cerebellar hemisphere.   There is also an area of encephalomalacia and gliosis within the right   basal frontal lobe related to prior insult.    There is diffuse cerebral volume loss with prominence of the sulci,   fissures, and cisternal spaces which is normal for the patient's age.   There is mild periventricular white matter hypoattenuation statistically   compatible with microvascular changes given calcific atherosclerotic   disease of the intracranial arteries.    There is no acute intracranial hemorrhage, shift of the midline   structures, or hydrocephalus.    The paranasal sinuses and mastoid air cells are clear. The calvarium   appears intact. Bilateral senile scleral plaques are noted. There is   evidence of bilateral cataract removal.     IMPRESSION: Evolving acute/subacute infarcts within the high right   paramedian parietal and right occipital lobes. No hemorrhagic   transformation.    Other chronic findings, as discussed      < end of copied text >        On Neurological Examination:  Mental Status - Patient is alert, awake, oriented X3. Speech is fluent, able to  name and repeat. Slightly impaired attention and concentration.  Follows commands well and able to answer questions appropriately. Mood and affect  normal. Able to recall 5/5 words.   Cranial Nerves - left facial asymmetry no dysarthria, left field cut, left sided neglect   Motor Exam -   Right upper diminished AROM secondary to pain, 4-/5 shoulder and elbow strengths  Left upper 2/5 shoulder and elbow   Right lower 4+/5 hip flexor, 5/5 knee and DF/PF  Left lower 3/5 hip flexor, 4/5 knee and DF/PF  Sensory    Impaired to light touch left side/extinction to touch  No tremors  DTS Reflexes: 3+ left side, +babinski      Toes are flexor     GENERAL Exam:     Nontoxic , No Acute Distress   HEENT:  normocephalic, atraumatic		  LUNGS:	Clear bilaterally  No Wheeze  Decreased bilaterally  HEART:	 Normal S1S2   No murmur RRR        GI/ ABDOMEN:  Soft  Non tender  EXTREMITIES:   No Edema   SKIN: Dressing intact to left lower extremity

## 2018-10-08 NOTE — PROGRESS NOTE ADULT - ATTENDING COMMENTS
I have personally seen and examined patient on the above date.  I discussed the case with  and I agree with findings and plan as detailed per note above, which I have amended where appropriate. I have personally seen and examined patient on the above date.  I discussed the case with  and I agree with findings and plan as detailed per note above, which I have amended where appropriate.    89 yo f pmh htn, AVR pw acute right mca cva and right occipital lobe cva w left hemiparesis PT consult placed recommend rehab awaiting evaluation from PMR and OT  essential HTN c/w current meds no aggressive BP control   loop recorder on discharge as per cardio r/o cardioembolic

## 2018-10-08 NOTE — OCCUPATIONAL THERAPY INITIAL EVALUATION ADULT - RANGE OF MOTION EXAMINATION, UPPER EXTREMITY
Left UE Passive ROM was WFL  (within functional limits)/Left elbow flexion with minimal movement. no AROM in shoulder or digits. Right UE decrease shoulder flexion premorbidly.

## 2018-10-09 DIAGNOSIS — I67.83 POSTERIOR REVERSIBLE ENCEPHALOPATHY SYNDROME: ICD-10-CM

## 2018-10-09 DIAGNOSIS — G93.40 ENCEPHALOPATHY, UNSPECIFIED: ICD-10-CM

## 2018-10-09 LAB
ALBUMIN SERPL ELPH-MCNC: 2.5 G/DL — LOW (ref 3.3–5)
ALP SERPL-CCNC: 48 U/L — SIGNIFICANT CHANGE UP (ref 40–120)
ALT FLD-CCNC: 24 U/L DA — SIGNIFICANT CHANGE UP (ref 10–45)
ANION GAP SERPL CALC-SCNC: 11 MMOL/L — SIGNIFICANT CHANGE UP (ref 5–17)
ANION GAP SERPL CALC-SCNC: 5 MMOL/L — SIGNIFICANT CHANGE UP (ref 5–17)
ANION GAP SERPL CALC-SCNC: 6 MMOL/L — SIGNIFICANT CHANGE UP (ref 5–17)
ANION GAP SERPL CALC-SCNC: 8 MMOL/L — SIGNIFICANT CHANGE UP (ref 5–17)
ANION GAP SERPL CALC-SCNC: 9 MMOL/L — SIGNIFICANT CHANGE UP (ref 5–17)
APAP SERPL-MCNC: 5 UG/ML — LOW (ref 10–30)
APTT BLD: 25.6 SEC — LOW (ref 27.5–37.4)
AST SERPL-CCNC: 46 U/L — HIGH (ref 10–40)
BILIRUB DIRECT SERPL-MCNC: 0.1 MG/DL — SIGNIFICANT CHANGE UP (ref 0–0.2)
BILIRUB INDIRECT FLD-MCNC: 0.3 MG/DL — SIGNIFICANT CHANGE UP (ref 0.2–1)
BILIRUB SERPL-MCNC: 0.4 MG/DL — SIGNIFICANT CHANGE UP (ref 0.2–1.2)
BLOOD GAS COMMENTS ARTERIAL: SIGNIFICANT CHANGE UP
BUN SERPL-MCNC: 32 MG/DL — HIGH (ref 7–23)
BUN SERPL-MCNC: 32 MG/DL — HIGH (ref 7–23)
BUN SERPL-MCNC: 33 MG/DL — HIGH (ref 7–23)
BUN SERPL-MCNC: 38 MG/DL — HIGH (ref 7–23)
BUN SERPL-MCNC: 38 MG/DL — HIGH (ref 7–23)
C PEPTIDE SERPL-MCNC: 0.8 NG/ML — LOW (ref 0.9–7.1)
CALCIUM SERPL-MCNC: 8.4 MG/DL — SIGNIFICANT CHANGE UP (ref 8.4–10.5)
CALCIUM SERPL-MCNC: 8.5 MG/DL — SIGNIFICANT CHANGE UP (ref 8.4–10.5)
CALCIUM SERPL-MCNC: 8.5 MG/DL — SIGNIFICANT CHANGE UP (ref 8.4–10.5)
CALCIUM SERPL-MCNC: 9.1 MG/DL — SIGNIFICANT CHANGE UP (ref 8.4–10.5)
CALCIUM SERPL-MCNC: 9.5 MG/DL — SIGNIFICANT CHANGE UP (ref 8.4–10.5)
CHLORIDE SERPL-SCNC: 92 MMOL/L — LOW (ref 96–108)
CHLORIDE SERPL-SCNC: 93 MMOL/L — LOW (ref 96–108)
CHLORIDE SERPL-SCNC: 97 MMOL/L — SIGNIFICANT CHANGE UP (ref 96–108)
CHLORIDE SERPL-SCNC: 97 MMOL/L — SIGNIFICANT CHANGE UP (ref 96–108)
CHLORIDE SERPL-SCNC: 98 MMOL/L — SIGNIFICANT CHANGE UP (ref 96–108)
CHLORIDE UR-SCNC: 37 MMOL/L — SIGNIFICANT CHANGE UP
CHLORIDE UR-SCNC: 39 MMOL/L — SIGNIFICANT CHANGE UP
CO2 BLDA-SCNC: 20 MMOL/L — LOW (ref 22–30)
CO2 SERPL-SCNC: 21 MMOL/L — LOW (ref 22–31)
CO2 SERPL-SCNC: 21 MMOL/L — LOW (ref 22–31)
CO2 SERPL-SCNC: 22 MMOL/L — SIGNIFICANT CHANGE UP (ref 22–31)
CO2 SERPL-SCNC: 24 MMOL/L — SIGNIFICANT CHANGE UP (ref 22–31)
CO2 SERPL-SCNC: 25 MMOL/L — SIGNIFICANT CHANGE UP (ref 22–31)
CREAT ?TM UR-MCNC: 49 MG/DL — SIGNIFICANT CHANGE UP
CREAT ?TM UR-MCNC: 50 MG/DL — SIGNIFICANT CHANGE UP
CREAT SERPL-MCNC: 1.44 MG/DL — HIGH (ref 0.5–1.3)
CREAT SERPL-MCNC: 1.52 MG/DL — HIGH (ref 0.5–1.3)
CREAT SERPL-MCNC: 1.54 MG/DL — HIGH (ref 0.5–1.3)
CREAT SERPL-MCNC: 1.58 MG/DL — HIGH (ref 0.5–1.3)
CREAT SERPL-MCNC: 1.61 MG/DL — HIGH (ref 0.5–1.3)
GAS PNL BLDA: SIGNIFICANT CHANGE UP
GLUCOSE BLDC GLUCOMTR-MCNC: 105 MG/DL — HIGH (ref 70–99)
GLUCOSE BLDC GLUCOMTR-MCNC: 106 MG/DL — HIGH (ref 70–99)
GLUCOSE BLDC GLUCOMTR-MCNC: 113 MG/DL — HIGH (ref 70–99)
GLUCOSE BLDC GLUCOMTR-MCNC: 153 MG/DL — HIGH (ref 70–99)
GLUCOSE BLDC GLUCOMTR-MCNC: 240 MG/DL — HIGH (ref 70–99)
GLUCOSE BLDC GLUCOMTR-MCNC: 83 MG/DL — SIGNIFICANT CHANGE UP (ref 70–99)
GLUCOSE SERPL-MCNC: 133 MG/DL — HIGH (ref 70–99)
GLUCOSE SERPL-MCNC: 135 MG/DL — HIGH (ref 70–99)
GLUCOSE SERPL-MCNC: 140 MG/DL — HIGH (ref 70–99)
GLUCOSE SERPL-MCNC: 35 MG/DL — CRITICAL LOW (ref 70–99)
GLUCOSE SERPL-MCNC: 72 MG/DL — SIGNIFICANT CHANGE UP (ref 70–99)
HCT VFR BLD CALC: 38.4 % — SIGNIFICANT CHANGE UP (ref 34.5–45)
HCT VFR BLD CALC: 39.9 % — SIGNIFICANT CHANGE UP (ref 34.5–45)
HGB BLD-MCNC: 12.4 G/DL — SIGNIFICANT CHANGE UP (ref 11.5–15.5)
HGB BLD-MCNC: 13.5 G/DL — SIGNIFICANT CHANGE UP (ref 11.5–15.5)
HOROWITZ INDEX BLDA+IHG-RTO: SIGNIFICANT CHANGE UP
INR BLD: 0.99 RATIO — SIGNIFICANT CHANGE UP (ref 0.88–1.16)
INSULIN SERPL-MCNC: 1.6 UU/ML — LOW (ref 3–17)
LACTATE SERPL-SCNC: 2.8 MMOL/L — HIGH (ref 0.7–2)
MAGNESIUM SERPL-MCNC: 1.5 MG/DL — LOW (ref 1.6–2.6)
MAGNESIUM SERPL-MCNC: 2.2 MG/DL — SIGNIFICANT CHANGE UP (ref 1.6–2.6)
MAGNESIUM SERPL-MCNC: 2.2 MG/DL — SIGNIFICANT CHANGE UP (ref 1.6–2.6)
MAGNESIUM SERPL-MCNC: 2.5 MG/DL — SIGNIFICANT CHANGE UP (ref 1.6–2.6)
MCHC RBC-ENTMCNC: 32.3 GM/DL — SIGNIFICANT CHANGE UP (ref 32–36)
MCHC RBC-ENTMCNC: 32.5 PG — SIGNIFICANT CHANGE UP (ref 27–34)
MCHC RBC-ENTMCNC: 33 PG — SIGNIFICANT CHANGE UP (ref 27–34)
MCHC RBC-ENTMCNC: 33.8 GM/DL — SIGNIFICANT CHANGE UP (ref 32–36)
MCV RBC AUTO: 100.8 FL — HIGH (ref 80–100)
MCV RBC AUTO: 97.7 FL — SIGNIFICANT CHANGE UP (ref 80–100)
PCO2 BLDA: 34 MMHG — SIGNIFICANT CHANGE UP (ref 32–46)
PH BLDA: 7.37 — SIGNIFICANT CHANGE UP (ref 7.35–7.45)
PHOSPHATE SERPL-MCNC: 2.9 MG/DL — SIGNIFICANT CHANGE UP (ref 2.5–4.5)
PHOSPHATE SERPL-MCNC: 3 MG/DL — SIGNIFICANT CHANGE UP (ref 2.5–4.5)
PHOSPHATE SERPL-MCNC: 3 MG/DL — SIGNIFICANT CHANGE UP (ref 2.5–4.5)
PLATELET # BLD AUTO: 195 K/UL — SIGNIFICANT CHANGE UP (ref 150–400)
PLATELET # BLD AUTO: 254 K/UL — SIGNIFICANT CHANGE UP (ref 150–400)
PO2 BLDA: 69 MMHG — LOW (ref 74–108)
POTASSIUM SERPL-MCNC: 4.5 MMOL/L — SIGNIFICANT CHANGE UP (ref 3.5–5.3)
POTASSIUM SERPL-MCNC: 4.5 MMOL/L — SIGNIFICANT CHANGE UP (ref 3.5–5.3)
POTASSIUM SERPL-MCNC: 4.7 MMOL/L — SIGNIFICANT CHANGE UP (ref 3.5–5.3)
POTASSIUM SERPL-MCNC: 4.8 MMOL/L — SIGNIFICANT CHANGE UP (ref 3.5–5.3)
POTASSIUM SERPL-MCNC: 5 MMOL/L — SIGNIFICANT CHANGE UP (ref 3.5–5.3)
POTASSIUM SERPL-SCNC: 4.5 MMOL/L — SIGNIFICANT CHANGE UP (ref 3.5–5.3)
POTASSIUM SERPL-SCNC: 4.5 MMOL/L — SIGNIFICANT CHANGE UP (ref 3.5–5.3)
POTASSIUM SERPL-SCNC: 4.7 MMOL/L — SIGNIFICANT CHANGE UP (ref 3.5–5.3)
POTASSIUM SERPL-SCNC: 4.8 MMOL/L — SIGNIFICANT CHANGE UP (ref 3.5–5.3)
POTASSIUM SERPL-SCNC: 5 MMOL/L — SIGNIFICANT CHANGE UP (ref 3.5–5.3)
PROLACTIN SERPL-MCNC: 36.1 NG/ML — HIGH (ref 3.4–24.1)
PROT SERPL-MCNC: 6.4 G/DL — SIGNIFICANT CHANGE UP (ref 6–8.3)
PROTHROM AB SERPL-ACNC: 11 SEC — SIGNIFICANT CHANGE UP (ref 9.8–12.7)
RBC # BLD: 3.81 M/UL — SIGNIFICANT CHANGE UP (ref 3.8–5.2)
RBC # BLD: 4.09 M/UL — SIGNIFICANT CHANGE UP (ref 3.8–5.2)
RBC # FLD: 11.7 % — SIGNIFICANT CHANGE UP (ref 10.3–14.5)
RBC # FLD: 12.2 % — SIGNIFICANT CHANGE UP (ref 10.3–14.5)
SAO2 % BLDA: 93 % — SIGNIFICANT CHANGE UP (ref 92–96)
SODIUM SERPL-SCNC: 123 MMOL/L — LOW (ref 135–145)
SODIUM SERPL-SCNC: 124 MMOL/L — LOW (ref 135–145)
SODIUM SERPL-SCNC: 127 MMOL/L — LOW (ref 135–145)
SODIUM SERPL-SCNC: 127 MMOL/L — LOW (ref 135–145)
SODIUM SERPL-SCNC: 128 MMOL/L — LOW (ref 135–145)
SODIUM UR-SCNC: 39 MMOL/L — SIGNIFICANT CHANGE UP
SODIUM UR-SCNC: 40 MMOL/L — SIGNIFICANT CHANGE UP
WBC # BLD: 10.6 K/UL — HIGH (ref 3.8–10.5)
WBC # BLD: 16.3 K/UL — HIGH (ref 3.8–10.5)
WBC # FLD AUTO: 10.6 K/UL — HIGH (ref 3.8–10.5)
WBC # FLD AUTO: 16.3 K/UL — HIGH (ref 3.8–10.5)

## 2018-10-09 PROCEDURE — 76770 US EXAM ABDO BACK WALL COMP: CPT | Mod: 26

## 2018-10-09 PROCEDURE — 76775 US EXAM ABDO BACK WALL LIM: CPT | Mod: 26

## 2018-10-09 PROCEDURE — 99233 SBSQ HOSP IP/OBS HIGH 50: CPT

## 2018-10-09 PROCEDURE — 93970 EXTREMITY STUDY: CPT | Mod: 26

## 2018-10-09 RX ORDER — SODIUM CHLORIDE 9 MG/ML
1000 INJECTION, SOLUTION INTRAVENOUS
Qty: 0 | Refills: 0 | Status: DISCONTINUED | OUTPATIENT
Start: 2018-10-09 | End: 2018-10-10

## 2018-10-09 RX ORDER — SODIUM CHLORIDE 5 G/100ML
100 INJECTION, SOLUTION INTRAVENOUS
Qty: 0 | Refills: 0 | Status: DISCONTINUED | OUTPATIENT
Start: 2018-10-09 | End: 2018-10-09

## 2018-10-09 RX ORDER — SODIUM CHLORIDE 9 MG/ML
1000 INJECTION, SOLUTION INTRAVENOUS
Qty: 0 | Refills: 0 | Status: DISCONTINUED | OUTPATIENT
Start: 2018-10-09 | End: 2018-10-09

## 2018-10-09 RX ORDER — SODIUM CHLORIDE 5 G/100ML
500 INJECTION, SOLUTION INTRAVENOUS
Qty: 0 | Refills: 0 | Status: DISCONTINUED | OUTPATIENT
Start: 2018-10-09 | End: 2018-10-09

## 2018-10-09 RX ORDER — MAGNESIUM SULFATE 500 MG/ML
2 VIAL (ML) INJECTION ONCE
Qty: 0 | Refills: 0 | Status: DISCONTINUED | OUTPATIENT
Start: 2018-10-09 | End: 2018-10-09

## 2018-10-09 RX ORDER — MAGNESIUM SULFATE 500 MG/ML
1 VIAL (ML) INJECTION
Qty: 0 | Refills: 0 | Status: COMPLETED | OUTPATIENT
Start: 2018-10-09 | End: 2018-10-09

## 2018-10-09 RX ORDER — METOPROLOL TARTRATE 50 MG
25 TABLET ORAL DAILY
Qty: 0 | Refills: 0 | Status: DISCONTINUED | OUTPATIENT
Start: 2018-10-09 | End: 2018-10-11

## 2018-10-09 RX ORDER — ACETAMINOPHEN 500 MG
650 TABLET ORAL EVERY 6 HOURS
Qty: 0 | Refills: 0 | Status: DISCONTINUED | OUTPATIENT
Start: 2018-10-09 | End: 2018-10-09

## 2018-10-09 RX ORDER — METOPROLOL TARTRATE 50 MG
5 TABLET ORAL ONCE
Qty: 0 | Refills: 0 | Status: COMPLETED | OUTPATIENT
Start: 2018-10-09 | End: 2018-10-09

## 2018-10-09 RX ORDER — SODIUM CHLORIDE 5 G/100ML
500 INJECTION, SOLUTION INTRAVENOUS
Qty: 0 | Refills: 0 | Status: DISCONTINUED | OUTPATIENT
Start: 2018-10-09 | End: 2018-10-10

## 2018-10-09 RX ORDER — DEXTROSE 50 % IN WATER 50 %
50 SYRINGE (ML) INTRAVENOUS ONCE
Qty: 0 | Refills: 0 | Status: COMPLETED | OUTPATIENT
Start: 2018-10-09 | End: 2018-10-09

## 2018-10-09 RX ORDER — ACETAMINOPHEN 500 MG
650 TABLET ORAL EVERY 6 HOURS
Qty: 0 | Refills: 0 | Status: DISCONTINUED | OUTPATIENT
Start: 2018-10-09 | End: 2018-10-18

## 2018-10-09 RX ORDER — NICARDIPINE HYDROCHLORIDE 30 MG/1
5 CAPSULE, EXTENDED RELEASE ORAL
Qty: 40 | Refills: 0 | Status: DISCONTINUED | OUTPATIENT
Start: 2018-10-09 | End: 2018-10-10

## 2018-10-09 RX ORDER — SODIUM CHLORIDE 5 G/100ML
1000 INJECTION, SOLUTION INTRAVENOUS
Qty: 0 | Refills: 0 | Status: DISCONTINUED | OUTPATIENT
Start: 2018-10-09 | End: 2018-10-09

## 2018-10-09 RX ADMIN — ATORVASTATIN CALCIUM 40 MILLIGRAM(S): 80 TABLET, FILM COATED ORAL at 21:16

## 2018-10-09 RX ADMIN — Medication 10 MILLIGRAM(S): at 00:18

## 2018-10-09 RX ADMIN — SODIUM CHLORIDE 30 MILLILITER(S): 5 INJECTION, SOLUTION INTRAVENOUS at 09:10

## 2018-10-09 RX ADMIN — SODIUM CHLORIDE 50 MILLILITER(S): 5 INJECTION, SOLUTION INTRAVENOUS at 03:05

## 2018-10-09 RX ADMIN — Medication 50 MILLILITER(S): at 03:05

## 2018-10-09 RX ADMIN — SODIUM CHLORIDE 75 MILLILITER(S): 9 INJECTION, SOLUTION INTRAVENOUS at 17:35

## 2018-10-09 RX ADMIN — Medication 650 MILLIGRAM(S): at 11:40

## 2018-10-09 RX ADMIN — Medication 650 MILLIGRAM(S): at 12:10

## 2018-10-09 RX ADMIN — Medication 650 MILLIGRAM(S): at 22:00

## 2018-10-09 RX ADMIN — Medication 5 MILLIGRAM(S): at 00:19

## 2018-10-09 RX ADMIN — NICARDIPINE HYDROCHLORIDE 25 MG/HR: 30 CAPSULE, EXTENDED RELEASE ORAL at 01:02

## 2018-10-09 RX ADMIN — Medication 100 GRAM(S): at 03:35

## 2018-10-09 RX ADMIN — SODIUM CHLORIDE 50 MILLILITER(S): 9 INJECTION, SOLUTION INTRAVENOUS at 08:00

## 2018-10-09 RX ADMIN — SODIUM CHLORIDE 40 MILLILITER(S): 9 INJECTION, SOLUTION INTRAVENOUS at 23:49

## 2018-10-09 RX ADMIN — Medication 5 MILLIGRAM(S): at 01:02

## 2018-10-09 RX ADMIN — Medication 100 GRAM(S): at 05:24

## 2018-10-09 RX ADMIN — Medication 650 MILLIGRAM(S): at 21:48

## 2018-10-09 RX ADMIN — Medication 0.1 MILLIGRAM(S): at 21:16

## 2018-10-09 NOTE — PROGRESS NOTE ADULT - SUBJECTIVE AND OBJECTIVE BOX
Patient transferred to ICU overnight for change in mental status, hypoglycemia, hyponatremia. Patient currently stable in ICU. Daughter is looking into transferring patient to other hospital at this time.   Will follow up with patient upon request of ICU team if patient is to stay at [facility].

## 2018-10-09 NOTE — PROGRESS NOTE ADULT - ASSESSMENT
S/P right hemispheric parietal and occipital infarcts and now a metabolic encephalopathy due to hyponatremia.      REC:  CCU care, follow serum sodium and neurologic status.  Medications reviewed. Telemetry to r/o RICARDO Stevenson

## 2018-10-09 NOTE — PROGRESS NOTE ADULT - ATTENDING COMMENTS
stroke cryptogenic  care coordinated with dr obrien. would rule out thromboembolic etiology for cva with monitor consider for long term event recorder or even an internal loop recorder to rule out same. consider SENA if patient deemed a candidate although probably high risk given logistical issues of anatomy of neck and esophagus etc. evaluate for indications for anticoagulation such as warfarin or a NOAC.     bp  control as per neuro parameters.

## 2018-10-09 NOTE — PROGRESS NOTE ADULT - ASSESSMENT
Physical Examination:  GENERAL:               Alert, Oriented, No acute distress.    HEENT:                    Pupils equal, reactive to light does not pass midline to the left  Symmetric. No JVD, Moist MM  PULM:                     Bilateral air entry, Clear to auscultation bilaterally, no significant sputum production, No Rales, No Rhonchi, No Wheezing  CVS:                         S1, S2,  +Murmur  ABD:                        Soft, nondistended, nontender, normoactive bowel sounds,   EXT:                         No edema, nontender, No Cyanosis or Clubbing   Vascular:                Warm Extremities, Normal Capillary refill, Normal Distal Pulses  SKIN:                       Warm and well perfused, no rashes noted.   NEURO:                  Alert, oriented, interactive, Left HP with 2/5 LUE - LLE 4/5 Right side 5/5, left hemineglect  PSYC:                      Calm, + Insight.        Assessment  Metabolic encephelopathy  acute hyponatremia unsure of etiology at this time ? SIADH  acute hypoglycemia unsure of etiology at this time despite being aggressively fed by DTR.   Acute CVA with Left HP , not a candidate for tpa on presentation r/o cardio embolic as multiple locations on ct.  now with labile BP, in pt with underlying HTN  uncontrolled bp, HTN  VHD s/p AVR  no known history of DM2 -Hemoglobin A1C, Whole Blood: 5.7 %        Plan   Hypertonic saline 30cc x 4 hrs, monitor Na closely  D5 NS for hypoglyemia, monitor FS  Neuro checks  Check EEG  Check Cpeptide,  Insulin levels, cortisol, TSH, acth, serum sulfanurea, urine lytes, urine osms  endo eval  renal eval  EEG  BP control keep < 180, BP control   asa, statin  1:1 observation, r/o munchhausen by proxy  d/w   supportive care .    Family Updated: 	DTR	     bedside                            Date: 10/9    Sedation & Analgesia:	on hold  Diet/Nutrition:		as tolerated, advance as per mental status  GI PPx:			PPI    DVT Ppx:		Heparin    Disposition: ICU monitoring with hypertonic saline    Goals of Care:Full Code. Physical Examination:  GENERAL:               Lethargic responsive, No acute distress.    HEENT:                 not opening eyes for exam  Symmetric. No JVD, Moist MM  PULM:                     Bilateral air entry, Clear to auscultation bilaterally, no significant sputum production, No Rales, No Rhonchi, No Wheezing  CVS:                         S1, S2,  +Murmur  ABD:                        Soft, nondistended, nontender, normoactive bowel sounds,   EXT:                         No edema, nontender, No Cyanosis or Clubbing   Vascular:                Warm Extremities, Normal Capillary refill, Normal Distal Pulses  SKIN:                       Warm and well perfused, no rashes noted.   NEURO:                  lethargic, intermittently  interactive, Left HP with 2/5 LUE - LLE 4/5 Right side 5/5, left hemineglect unable to be assessed   PSYC:                      Calm, No Insight.        Assessment  Metabolic encephelopathy  acute hyponatremia unsure of etiology at this time ? SIADH  acute hypoglycemia unsure of etiology at this time despite being aggressively fed by DTR.   Acute CVA with Left HP , not a candidate for tpa on presentation r/o cardio embolic as multiple locations on ct.  now with labile BP, in pt with underlying HTN  uncontrolled bp, HTN  VHD s/p AVR  no known history of DM2 -Hemoglobin A1C, Whole Blood: 5.7 %        Plan   Hypertonic saline 30cc x 4 hrs, monitor Na closely  D5 NS for hypoglyemia, monitor FS  Neuro checks  Check EEG  Check Cpeptide,  Insulin levels, cortisol, TSH, acth, serum sulfanurea, urine lytes, urine osms  endo eval  renal eval  EEG  BP control keep < 180, BP control   asa, statin  1:1 observation, r/o munchhausen by proxy  d/w   supportive care .    Family Updated: 	DTR	     bedside                            Date: 10/9    Sedation & Analgesia:	on hold  Diet/Nutrition:		as tolerated, advance as per mental status  GI PPx:			PPI    DVT Ppx:		Heparin    Disposition: ICU monitoring with hypertonic saline    Goals of Care:Full Code.

## 2018-10-09 NOTE — PROVIDER CONTACT NOTE (EICU) - ASSESSMENT
Reviewed labs. Noted Na 124, so hypertonic given and urine lytes checked.  Also noted to have glucose 35 on chem 8, glucose given as well. Check insulin level and c-peptide.  Control BP, target 150-170.  Per bedside team, patient now more alert and responsive.  Continue to monitor in ICU at this time.

## 2018-10-09 NOTE — SWALLOW BEDSIDE ASSESSMENT ADULT - COMMENTS
CXR 10/5- no acute infiltrates, CXR 10/5- no acute infiltrates, head CT- evolving acute/subacute infarcts in high right paramedian parietal and right occipital lobes, stable old left cerebellar infarct, old right basal ganglia lacunar infarct burping noted following liquid trials- baseline per daughter

## 2018-10-09 NOTE — PROVIDER CONTACT NOTE (EICU) - BACKGROUND
Admitted 10/5 with LUE weakness and facial droop >24 hours, found to be hypertensive SBP 200s. Admitted to ICU with slowly improving neuro exam.

## 2018-10-09 NOTE — PROGRESS NOTE ADULT - PROBLEM SELECTOR PLAN 1
likely multifactorial from hyponatremia, hypoglycemia, hypertension and evolving infarcts  BMP showed Na 124, given 3% saline 50cc/hr x2 hours and Glucose 35 and 1 amp D50 w/ glucose becoming 240, unclear etiology as  pt w/out hx of DM, BG throughout stay ranging 103-178, and patient ate 3 meals today. Repeat BMP   Hypertension- hypertensive encephalopathy vs PRES syndrome. BP not responsive to IV pushes, started on nicardipine drip  will titrate for goal -170, likely multifactorial from hyponatremia, hypoglycemia, hypertension and evolving infarcts  BMP showed Na 124, given 3% saline 50cc/hr x2 hours and Glucose 35 and 1 amp D50 w/ glucose becoming 240, unclear etiology as  pt w/out hx of DM, BG throughout stay ranging 103-178, and patient ate 3 meals today. Repeat BMP   Hypertension- hypertensive encephalopathy vs PRES syndrome. BP not responsive to IV pushes, started on nicardipine drip  will titrate for goal -170,  Send Prolactin to r/o seizure

## 2018-10-09 NOTE — PROVIDER CONTACT NOTE (EICU) - ACTION/TREATMENT ORDERED:
No acute neurointervention at this time.  BP control target -170s  Follow recommendations of neurointerventionalist.  Currently protecting airway but may need intubation if condition worsens.  If sodium and BP corrected and no evidence of seizure, can get MRI / MRA to evaluate. No acute neurointervention at this time.  BP control target -170s  Follow recommendations of neurointerventionalist.  Repeat all labs, including ABG / lactate / prolactin  Currently protecting airway but may need intubation if condition worsens.  If sodium and BP corrected and no evidence of seizure, can get MRI / MRA to evaluate. No acute neurointervention at this time. No indication for transfer.  BP control target -170s  Follow recommendations of neurointerventionalist.  Repeat all labs, including ABG / lactate / prolactin  Currently protecting airway but may need intubation if condition worsens.  If sodium and BP corrected and no evidence of seizure, can get MRI / MRA to evaluate.

## 2018-10-09 NOTE — PROGRESS NOTE ADULT - SUBJECTIVE AND OBJECTIVE BOX
Follow up for  SUBJ:    lethargic no cardiac symptoms reported daughter antonio black at bedside      PMH  Hypertension, unspecified type      MEDICATIONS  (STANDING):  aspirin  chewable 81 milliGRAM(s) Oral daily  atorvastatin 40 milliGRAM(s) Oral at bedtime  cloNIDine Patch 0.1 mG/24Hr(s) 1 patch Transdermal every 7 days  dextrose 5% + sodium chloride 0.9%. 1000 milliLiter(s) (50 mL/Hr) IV Continuous <Continuous>  docusate sodium 100 milliGRAM(s) Oral two times a day  heparin  Injectable 5000 Unit(s) SubCutaneous every 12 hours  influenza   Vaccine 0.5 milliLiter(s) IntraMuscular once  metoprolol succinate ER 50 milliGRAM(s) Oral every 12 hours  niCARdipine Infusion 5 mG/Hr (25 mL/Hr) IV Continuous <Continuous>  pantoprazole    Tablet 40 milliGRAM(s) Oral daily  sodium chloride 3%. 500 milliLiter(s) (30 mL/Hr) IV Continuous <Continuous>    MEDICATIONS  (PRN):  acetaminophen  Suppository .. 650 milliGRAM(s) Rectal every 6 hours PRN Temp greater or equal to 38C (100.4F), Severe Pain (7 - 10)        PHYSICAL EXAM:  Vital Signs Last 24 Hrs  T(C): 37 (09 Oct 2018 09:30), Max: 37.2 (09 Oct 2018 06:00)  T(F): 98.6 (09 Oct 2018 09:30), Max: 98.9 (09 Oct 2018 06:00)  HR: 68 (09 Oct 2018 11:00) (52 - 119)  BP: 163/55 (09 Oct 2018 11:00) (104/44 - 218/116)  BP(mean): 87 (09 Oct 2018 11:00) (62 - 134)  RR: 21 (09 Oct 2018 11:00) (15 - 23)  SpO2: 100% (09 Oct 2018 11:00) (98% - 100%)    GENERAL: NAD, elerly woman alopecia  HEAD:  Atraumatic, Normocephalic  EYES: EOMI, PERRLA, conjunctiva and sclera clear  ENT: Moist mucous membranes,  NECK: Supple, No JVD, no bruits  CHEST/LUNG: Clear to percussion bilaterally; No rales, rhonchi, wheezing, or rubs  HEART: Regular rate and rhythm; No murmurs, rubs, or gallops PMI non displaced.  ABDOMEN: Soft, Nontender, Nondistended; Bowel sounds present  EXTREMITIES:  2+ Peripheral Pulses, No clubbing, cyanosis, or edema  SKIN: No rashes or lesions  NERVOUS SYSTEM:  Cranial Nerves II-XII intact      TELEMETRY:    rsr    ECG:    < from: 12 Lead ECG (10.05.18 @ 10:58) >    Ventricular Rate 66 BPM    Atrial Rate 66 BPM    P-R Interval 180 ms    QRS Duration 90 ms    Q-T Interval 452 ms    QTC Calculation(Bezet) 473 ms    P Axis 80 degrees    R Axis 57 degrees    T Axis 101 degrees    Diagnosis Line Normal sinus rhythm  LVH , nstwc  Borderline ECG  No previous ECGs available  Confirmed by PALMIRA PALM, GARY MEJIAS ([id number]) on 10/6/2018 8:50:38 AM    < end of copied text >    ECHO:    LABS:                        12.4   10.6  )-----------( 195      ( 09 Oct 2018 05:45 )             38.4     10-09    123<L>  |  93<L>  |  38<H>  ----------------------------<  72  4.7   |  21<L>  |  1.44<H>    Ca    9.1      09 Oct 2018 05:45  Phos  3.0     10-09  Mg     2.5     10-09      I&O's Summary    08 Oct 2018 07:01  -  09 Oct 2018 07:00  --------------------------------------------------------  IN: 715 mL / OUT: 0 mL / NET: 715 mL    09 Oct 2018 07:01  -  09 Oct 2018 12:10  --------------------------------------------------------  IN: 190 mL / OUT: 0 mL / NET: 190 mL      BNP    RADIOLOGY & ADDITIONAL STUDIES:    ECHO:    < from: TTE Echo Complete w/Doppler (10.07.18 @ 09:03) >  Summary:   1. Left ventricular ejection fraction, by visual estimation, is 55 to   60%.   2. Normal global left ventricular systolic function.   3. Increased LV wall thickness.   4. Spectral Doppler shows impaired relaxation pattern of left   ventricular myocardial filling (Grade I diastolic dysfunction).   5. There is mild concentric left ventricular hypertrophy.   6. Thickening of the anterior and posterior mitral valve leaflets.   7. Mild tricuspid regurgitation.   8. Mild aortic regurgitation.   9. Pulmonic valve regurgitation.  10. Structurally normal pulmonic valve, with normal leaflet excursion.  11. Estimated pulmonary artery systolic pressure is 43.8 mmHg assuming a   right atrial pressure of 10 mmHg, which is consistent with mild pulmonary   hypertension.  12. LA volume Index is 71.8 ml/m² ml/m2.  13. The aortic valve mean gradient is 6.7 mmHg consistent with normally   opening aortic valve.    [id number] Gary Rivers MD,[facility] , Electronically signed on 10/7/2018 at   12:52:29 PM       *** Final ***      GARY RIVERS M.D., ATTENDING CARDIOLOGIST  This document has been electronically signed. Oct  7 2018  9:03AM    < end of copied text >

## 2018-10-09 NOTE — PROGRESS NOTE ADULT - ASSESSMENT
89 y/o female pmhx HTN and AVR admitted to  on 10/5 after patient became weak at home and was unable to get up off the floor. Admitted w/ acute CVA, found to have evolving CVA of R MCA infarct. Today patient w/ worsening encephalopathy, likely multifactorial from hypertension, metabolic and evolving infarcts. Transferred to ICU for BP control and concern for ability to protect her airway. 89 y/o female pmhx HTN and AVR admitted to  on 10/5 after patient became weak at home and was unable to get up off the floor. Admitted w/ acute CVA, found to have evolving CVA of R MCA infarct. Today patient w/ worsening encephalopathy, likely multifactorial from hypertension, metabolic and evolving infarcts. Transferred to ICU for BP control and concern for ability to protect her airway.       Case discussed w/ eICU attending Dr. Rivera and ICU attending Dr. Ruelas

## 2018-10-09 NOTE — SWALLOW BEDSIDE ASSESSMENT ADULT - SWALLOW EVAL: DIAGNOSIS
Pt presents with a mild oropharyngeal dysphagia characterized by suspected reduced bolus control of thin liquids and slow, yet functional bolus formation with solids.  Laryngeal elevation was reduced and swallow appeared coordinated.  Noted burping following thin liquid trials- baseline per daughter. No immediate signs of aspiration noted, h/w pt at increased risk if taking large bolus size 2/2 reduced bolus control

## 2018-10-09 NOTE — PROGRESS NOTE ADULT - SUBJECTIVE AND OBJECTIVE BOX
Patient is a 89y old  Female who presents with a chief complaint of Weakness (08 Oct 2018 14:11)      BRIEF HOSPITAL COURSE:  89 y/o female pmhx HTN,     Events last 24 hours: ***    PAST MEDICAL & SURGICAL HISTORY:  Hypertension, unspecified type  History of total hip replacement, right: 2014 at [facility],  No complications  S/p bilateral carotid endarterectomy: 2008 st [facility], no complications  S/P AVR: 2008 at [facility], Dr. Bermudez.  No complications      Review of Systems:  CONSTITUTIONAL: No fever, chills, or fatigue  EYES: No eye pain, visual disturbances, or discharge  ENMT:  No difficulty hearing, tinnitus, vertigo; No sinus or throat pain  NECK: No pain or stiffness  RESPIRATORY: No cough, wheezing, chills or hemoptysis; No shortness of breath  CARDIOVASCULAR: No chest pain, palpitations, dizziness, or leg swelling  GASTROINTESTINAL: No abdominal or epigastric pain. No nausea, vomiting, or hematemesis; No diarrhea or constipation. No melena or hematochezia.  GENITOURINARY: No dysuria, frequency, hematuria, or incontinence  NEUROLOGICAL: No headaches, memory loss, loss of strength, numbness, or tremors  SKIN: No itching, burning, rashes, or lesions   MUSCULOSKELETAL: No joint pain or swelling; No muscle, back, or extremity pain  PSYCHIATRIC: No depression, anxiety, mood swings, or difficulty sleeping      Medications:    cloNIDine Patch 0.1 mG/24Hr(s) 1 patch Transdermal every 7 days  losartan 50 milliGRAM(s) Oral every 12 hours  metoprolol succinate ER 50 milliGRAM(s) Oral every 12 hours  niCARdipine Infusion 5 mG/Hr IV Continuous <Continuous>      acetaminophen   Tablet .. 650 milliGRAM(s) Oral every 6 hours PRN      aspirin  chewable 81 milliGRAM(s) Oral daily  heparin  Injectable 5000 Unit(s) SubCutaneous every 12 hours    docusate sodium 100 milliGRAM(s) Oral two times a day  pantoprazole    Tablet 40 milliGRAM(s) Oral daily      atorvastatin 40 milliGRAM(s) Oral at bedtime      influenza   Vaccine 0.5 milliLiter(s) IntraMuscular once              ICU Vital Signs Last 24 Hrs  T(C): 36.8 (08 Oct 2018 21:56), Max: 36.8 (08 Oct 2018 21:56)  T(F): 98.2 (08 Oct 2018 21:56), Max: 98.2 (08 Oct 2018 21:56)  HR: 86 (08 Oct 2018 21:56) (51 - 86)  BP: 193/61 (08 Oct 2018 21:56) (151/56 - 193/61)  BP(mean): --  ABP: --  ABP(mean): --  RR: 16 (08 Oct 2018 21:56) (15 - 16)  SpO2: 98% (08 Oct 2018 21:56) (98% - 99%)          I&O's Detail    07 Oct 2018 07:01  -  08 Oct 2018 07:00  --------------------------------------------------------  IN:    Oral Fluid: 535 mL  Total IN: 535 mL    OUT:  Total OUT: 0 mL    Total NET: 535 mL      08 Oct 2018 07:01  -  09 Oct 2018 01:08  --------------------------------------------------------  IN:    Oral Fluid: 535 mL  Total IN: 535 mL    OUT:  Total OUT: 0 mL    Total NET: 535 mL            LABS:                        11.4   7.0   )-----------( 184      ( 07 Oct 2018 05:32 )             35.8     10-08    129<L>  |  96  |  39<H>  ----------------------------<  103<H>  4.1   |  25  |  1.50<H>    Ca    9.0      08 Oct 2018 06:00  Phos  3.2     10-07  Mg     1.9     10-07            CAPILLARY BLOOD GLUCOSE            CULTURES:      Physical Examination:    General: No acute distress.  Alert, oriented, interactive, nonfocal    HEENT: Pupils equal, reactive to light.  Symmetric.    PULM: Clear to auscultation bilaterally, no significant sputum production    CVS: Regular rate and rhythm, no murmurs, rubs, or gallops    ABD: Soft, nondistended, nontender, normoactive bowel sounds, no masses    EXT: No edema, nontender    SKIN: Warm and well perfused, no rashes noted.    NEURO: A&Ox3, strength 5/5 all extremities, cranial nerves grossly intact, no focal deficits    RADIOLOGY: ***    CRITICAL CARE TIME SPENT: ***  Evaluating/treating patient, reviewing data/labs/imaging, discussing case with multidisciplinary team, discussing plan/goals of care with patient/family. Non-inclusive of procedure time. Patient is a 89y old  Female who presents with a chief complaint of Weakness (08 Oct 2018 14:11)      BRIEF HOSPITAL COURSE:  89 y/o female pmhx HTN and AVR admitted to  on 10/5 after patient became weak at home and was unable to get up off the floor. Pt presented w/ left sided weakness and facial droop, however was out of the window for TPA. Pt noted to be hypertensive en route to the hospital as well w/ SBP >200. Pt diagnosed w/ acute CVA admitted to CCU. Repeat CT head 24hrs later showed evolving R MCA infarct in the right parietal and occipital lobes. Pt transferred out of CCU 10/ 7.     Events last 24 hours: Tonight approached by RN, as patients has becoming increasingly lethargic and weak on left side strength 1/5 upper and lower extremities. Pt sent for STAT head CT, pre valdez results show read as stable head CT w/ out hemorrhagic conversion or worsening infarcts. Soon after RN noticed patient to be hypertensive to 240's w/ continuos decline in mental status. Pt given 5mg Lopressor x2 w/ short relief both times before BP rebounded to 230's. 1 dose 10mg IV  hydralazine then brought over to ICU, placed on cadinene drip. BP continued to be very labile  on nicardipine. Pt w/ worsening encephalopathy, and new right sided weakness before ultimately becoming poorly responsive and no withdrawal to pain. Left pupil slightly > than Right by 1-2mm. Left reactive to light, however right pupil was not. Spoke w/ eICU attending who contacted transfer center to send patient to [facility] for MRI/ MRA. Neurointerventionalist  at [facility], recommended fixing slowly drifting Na w/ hypertonic fluid, obtaining ABG, urine lytes and prolactin to r/o seizure.   Throughout the night, daughter was demanding transfer to [facility]. After explaining the process and the need for an accepting physician she became irate, yelling at staff and threatening to carry take her mother out of the hospital herself.  We explained she needs to HCP and there approval before she can sign AMA, she then left to go home to bring back the HCP and her wheel chair to carry her out. The HCP was the patients  89 y/o Jonathon Momin, who told me over the phone " let her do what she wants". Ultimately daughter realized her mother would not survive the transfer in her car to [facility] in her current condition and agreed to let us continue with our work up.     PAST MEDICAL & SURGICAL HISTORY:  Hypertension, unspecified type  History of total hip replacement, right: 2014 at [facility],  No complications  S/p bilateral carotid endarterectomy: 2008 st [facility], no complications  S/P AVR: 2008 at [facility], Dr. Bermudez.  No complications      Review of Systems:  Unable to obtain due to clinical condition     Medications:    cloNIDine Patch 0.1 mG/24Hr(s) 1 patch Transdermal every 7 days  losartan 50 milliGRAM(s) Oral every 12 hours  metoprolol succinate ER 50 milliGRAM(s) Oral every 12 hours  niCARdipine Infusion 5 mG/Hr IV Continuous <Continuous>  acetaminophen   Tablet .. 650 milliGRAM(s) Oral every 6 hours PRN  aspirin  chewable 81 milliGRAM(s) Oral daily  heparin  Injectable 5000 Unit(s) SubCutaneous every 12 hours  docusate sodium 100 milliGRAM(s) Oral two times a day  pantoprazole    Tablet 40 milliGRAM(s) Oral daily  atorvastatin 40 milliGRAM(s) Oral at bedtime  influenza   Vaccine 0.5 milliLiter(s) IntraMuscular once              ICU Vital Signs Last 24 Hrs  T(C): 36.8 (08 Oct 2018 21:56), Max: 36.8 (08 Oct 2018 21:56)  T(F): 98.2 (08 Oct 2018 21:56), Max: 98.2 (08 Oct 2018 21:56)  HR: 86 (08 Oct 2018 21:56) (51 - 86)  BP: 193/61 (08 Oct 2018 21:56) (151/56 - 193/61)  RR: 16 (08 Oct 2018 21:56) (15 - 16)  SpO2: 98% (08 Oct 2018 21:56) (98% - 99%)          I&O's Detail    07 Oct 2018 07:01  -  08 Oct 2018 07:00  --------------------------------------------------------  IN:    Oral Fluid: 535 mL  Total IN: 535 mL    OUT:  Total OUT: 0 mL    Total NET: 535 mL      08 Oct 2018 07:01  -  09 Oct 2018 01:08  --------------------------------------------------------  IN:    Oral Fluid: 535 mL  Total IN: 535 mL    OUT:  Total OUT: 0 mL    Total NET: 535 mL            LABS:                        11.4   7.0   )-----------( 184      ( 07 Oct 2018 05:32 )             35.8     10-08    129<L>  |  96  |  39<H>  ----------------------------<  103<H>  4.1   |  25  |  1.50<H>    Ca    9.0      08 Oct 2018 06:00  Phos  3.2     10-07  Mg     1.9     10-07            CAPILLARY BLOOD GLUCOSE            CULTURES:      Physical Examination:    General: Elderly female, stuporous.      HEENT: L >R by 1-2mm. L reactive to light. R non reactive     PULM: Clear to auscultation bilaterally, no significant sputum production. No wheeze/rales/ rhonchi     CVS: +s1/S2. NSR. No JVD     ABD: Soft, nondistended, nontender, normoactive bowel sounds, no masses    EXT: No edema, nontender    SKIN: Warm and well perfused, no rashes noted.    NEURO:  Not alert on interactive. Does not follow commands. Not responding to painful stimuli. Is protecting her airway at this time.         CRITICAL CARE TIME SPENT:  65 min  Evaluating/treating patient, reviewing data/labs/imaging, discussing case with multidisciplinary team, discussing plan/goals of care with patient/family. Non-inclusive of procedure time. Patient is a 89y old  Female who presents with a chief complaint of Weakness (08 Oct 2018 14:11)      BRIEF HOSPITAL COURSE:  89 y/o female pmhx HTN and AVR admitted to  on 10/5 after patient became weak at home and was unable to get up off the floor. Pt presented w/ left sided weakness and facial droop, however was out of the window for TPA. Pt noted to be hypertensive en route to the hospital as well w/ SBP >200. Pt diagnosed w/ acute CVA admitted to CCU. Repeat CT head 24hrs later showed evolving R MCA infarct in the right parietal and occipital lobes. Pt transferred out of CCU 10/ 7.     Events last 24 hours: Tonight approached by RN, as patients has becoming increasingly lethargic and weak on left side strength 1/5 upper and lower extremities. Pt sent for STAT head CT, pre valdez results show read as stable head CT w/ out hemorrhagic conversion or worsening infarcts. Soon after RN noticed patient to be hypertensive to 240's w/ continuos decline in mental status. Pt given 5mg Lopressor x2 w/ short relief both times before BP rebounded to 230's. 1 dose 10mg IV  hydralazine then brought over to ICU, placed on cadinene drip. BP continued to be very labile  on nicardipine. Pt w/ worsening encephalopathy, and new right sided weakness before ultimately becoming poorly responsive and no withdrawal to pain. Left pupil slightly > than Right by 1-2mm. Left reactive to light, however right pupil was not. Spoke w/ eICU attending who contacted transfer center to send patient to [facility] for MRI/ MRA. Neurointerventionalist  at [facility], recommended fixing slowly drifting Na w/ hypertonic fluid, obtaining ABG, urine lytes and prolactin to r/o seizure.  Daughter was refusing CTA due to patient Crt.   Throughout the night, daughter was demanding transfer to [facility]. After explaining the process and the need for an accepting physician she became irate, yelling at staff and threatening to carry take her mother out of the hospital herself.  We explained she needs the HCP and there approval before she can sign AMA, she then left to go home to bring back the HCP and her wheel chair to carry her out. The HCP was the patients  89 y/o Jonathon Momin, who told me over the phone " let her do what she wants". Ultimately daughter realized her mother would not survive the transfer in her car to [facility] in her current condition and agreed to let us continue with our work up.     PAST MEDICAL & SURGICAL HISTORY:  Hypertension, unspecified type  History of total hip replacement, right: 2014 at [facility],  No complications  S/p bilateral carotid endarterectomy: 2008 st [facility], no complications  S/P AVR: 2008 at [facility], Dr. Bermudez.  No complications      Review of Systems:  Unable to obtain due to clinical condition     Medications:    cloNIDine Patch 0.1 mG/24Hr(s) 1 patch Transdermal every 7 days  losartan 50 milliGRAM(s) Oral every 12 hours  metoprolol succinate ER 50 milliGRAM(s) Oral every 12 hours  niCARdipine Infusion 5 mG/Hr IV Continuous <Continuous>  acetaminophen   Tablet .. 650 milliGRAM(s) Oral every 6 hours PRN  aspirin  chewable 81 milliGRAM(s) Oral daily  heparin  Injectable 5000 Unit(s) SubCutaneous every 12 hours  docusate sodium 100 milliGRAM(s) Oral two times a day  pantoprazole    Tablet 40 milliGRAM(s) Oral daily  atorvastatin 40 milliGRAM(s) Oral at bedtime  influenza   Vaccine 0.5 milliLiter(s) IntraMuscular once              ICU Vital Signs Last 24 Hrs  T(C): 36.8 (08 Oct 2018 21:56), Max: 36.8 (08 Oct 2018 21:56)  T(F): 98.2 (08 Oct 2018 21:56), Max: 98.2 (08 Oct 2018 21:56)  HR: 86 (08 Oct 2018 21:56) (51 - 86)  BP: 193/61 (08 Oct 2018 21:56) (151/56 - 193/61)  RR: 16 (08 Oct 2018 21:56) (15 - 16)  SpO2: 98% (08 Oct 2018 21:56) (98% - 99%)          I&O's Detail    07 Oct 2018 07:01  -  08 Oct 2018 07:00  --------------------------------------------------------  IN:    Oral Fluid: 535 mL  Total IN: 535 mL    OUT:  Total OUT: 0 mL    Total NET: 535 mL      08 Oct 2018 07:01  -  09 Oct 2018 01:08  --------------------------------------------------------  IN:    Oral Fluid: 535 mL  Total IN: 535 mL    OUT:  Total OUT: 0 mL    Total NET: 535 mL            LABS:                        11.4   7.0   )-----------( 184      ( 07 Oct 2018 05:32 )             35.8     10-08    129<L>  |  96  |  39<H>  ----------------------------<  103<H>  4.1   |  25  |  1.50<H>    Ca    9.0      08 Oct 2018 06:00  Phos  3.2     10-07  Mg     1.9     10-07            CAPILLARY BLOOD GLUCOSE            CULTURES:      Physical Examination:    General: Elderly female, stuporous.      HEENT: L >R by 1-2mm. L reactive to light. R non reactive     PULM: Clear to auscultation bilaterally, no significant sputum production. No wheeze/rales/ rhonchi     CVS: +s1/S2. NSR. No JVD     ABD: Soft, nondistended, nontender, normoactive bowel sounds, no masses    EXT: No edema, nontender    SKIN: Warm and well perfused, no rashes noted.    NEURO:  Not alert on interactive. Does not follow commands. Not responding to painful stimuli. Is protecting her airway at this time.         CRITICAL CARE TIME SPENT:  65 min  Evaluating/treating patient, reviewing data/labs/imaging, discussing case with multidisciplinary team, discussing plan/goals of care with patient/family. Non-inclusive of procedure time.

## 2018-10-09 NOTE — PROGRESS NOTE ADULT - SUBJECTIVE AND OBJECTIVE BOX
Neurology Progress Note    Subjective & Objective:  Last PM became obtunded with hyponatremia and sodium of 123.  Given hypertonic saline and transferred to CCU.  CT head repeat no change.  BP tends to run high and fluctuates.  Today improved more responsive and verbal.  Repeat serum sodium 127.              Lab:  10-09    127<L>  |  97  |  32<H>  ----------------------------<  140<H>  5.0   |  22  |  1.54<H>    Ca    8.5      09 Oct 2018 14:30  Phos  3.0     10-09  Mg     2.2     10-09                                12.4   10.6  )-----------( 195      ( 09 Oct 2018 05:45 )             38.4         Radiology: CT                      MRI  EEG:      MEDICATIONS  (STANDING):  aspirin  chewable 81 milliGRAM(s) Oral daily  atorvastatin 40 milliGRAM(s) Oral at bedtime  cloNIDine Patch 0.1 mG/24Hr(s) 1 patch Transdermal every 7 days  dextrose 5% + sodium chloride 0.9%. 1000 milliLiter(s) (50 mL/Hr) IV Continuous <Continuous>  docusate sodium 100 milliGRAM(s) Oral two times a day  heparin  Injectable 5000 Unit(s) SubCutaneous every 12 hours  influenza   Vaccine 0.5 milliLiter(s) IntraMuscular once  metoprolol succinate ER 50 milliGRAM(s) Oral every 12 hours  niCARdipine Infusion 5 mG/Hr (25 mL/Hr) IV Continuous <Continuous>  pantoprazole    Tablet 40 milliGRAM(s) Oral daily  sodium chloride 3%. 500 milliLiter(s) (30 mL/Hr) IV Continuous <Continuous>    MEDICATIONS  (PRN):  acetaminophen  Suppository .. 650 milliGRAM(s) Rectal every 6 hours PRN Temp greater or equal to 38C (100.4F), Severe Pain (7 - 10)

## 2018-10-09 NOTE — CONSULT NOTE ADULT - SUBJECTIVE AND OBJECTIVE BOX
NEPHROLOGY CONSULTATION    CHIEF COMPLAINT: CVA    HPI:  Pt is 89 year old female with PMH HTN and AVR presented to ED at [facility] today via EMS.  Per , patient developed left arm weakness and left facial droop at about 9am yesterday, at that time they did not seek medical intervention.  This morning patient was unsteady in bathroom and  assisted her to the floor.  Called EMS to help with transfer out of bathroom, noted to be hypertensive by EMS and brought to ED against husbands wishes.  In ED SBP's found to be greater than 200.  At the time denied  fever, chills, chest pain, shortness of breath, abdominal pain, lightheadedness, dizziness, headache. Dx w/R parietal and occipital CVAs. Patient with hyponatremia since Oct 7 (133), gradually worsening till this am (123). Poorly responsive/lethargic since this am. Hx from chart as pt unable to provide hx or ROS at present. 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 Labile BP noted. S/p 3% saline today x 4 hr w/improvement in Na level.    ROS:  as above    Allergies:  ACE inhibitors (Other)  hydrALAZINE (Other)    PAST MEDICAL & SURGICAL HISTORY:  Hypertension, unspecified type  History of total hip replacement, right: 2014 at [facility],  No complications  S/p bilateral carotid endarterectomy: 2008 st [facility], no complications  S/P AVR: 2008 at [facility], Dr. Bermudez.  No complications  CVA    SOCIAL HISTORY:  negative    FAMILY HISTORY:  No pertinent family history in first degree relatives    MEDICATIONS  (STANDING):  aspirin  chewable 81 milliGRAM(s) Oral daily  atorvastatin 40 milliGRAM(s) Oral at bedtime  cloNIDine Patch 0.1 mG/24Hr(s) 1 patch Transdermal every 7 days  dextrose 5% + sodium chloride 0.9%. 1000 milliLiter(s) (75 mL/Hr) IV Continuous <Continuous>  docusate sodium 100 milliGRAM(s) Oral two times a day  heparin  Injectable 5000 Unit(s) SubCutaneous every 12 hours  influenza   Vaccine 0.5 milliLiter(s) IntraMuscular once  metoprolol succinate ER 50 milliGRAM(s) Oral every 12 hours  niCARdipine Infusion 5 mG/Hr (25 mL/Hr) IV Continuous <Continuous>  pantoprazole    Tablet 40 milliGRAM(s) Oral daily  sodium chloride 3%. 500 milliLiter(s) (30 mL/Hr) IV Continuous <Continuous>    Vital Signs Last 24 Hrs  T(C): 37.1 (10-09-18 @ 18:00), Max: 37.5 (10-09-18 @ 15:30)  T(F): 98.7 (10-09-18 @ 18:00), Max: 99.5 (10-09-18 @ 15:30)  HR: 60 (10-09-18 @ 18:30) (54 - 119)  BP: 148/47 (10-09-18 @ 18:30) (97/41 - 218/116)  BP(mean): 75 (10-09-18 @ 18:30) (57 - 152)  RR: 19 (10-09-18 @ 18:30) (15 - 25)  SpO2: 100% (10-09-18 @ 18:30) (97% - 100%)    Seen in ICU  Lungs good air entry b/l  Abd soft, ND  Extr no edema  Skin no rash    LABS:                        12.4   10.6  )-----------( 195      ( 09 Oct 2018 05:45 )             38.4     10-09    128<L>  |  97  |  33<H>  ----------------------------<  133<H>  4.8   |  25  |  1.61<H>    Ca    8.5      09 Oct 2018 16:47  Phos  3.0     10-09  Mg     2.2     10-09    TPro  6.4  /  Alb  2.5<L>  /  TBili  0.4  /  DBili  0.1  /  AST  46<H>  /  ALT  24  /  AlkPhos  48  10-09    LIVER FUNCTIONS - ( 09 Oct 2018 16:47 )  Alb: 2.5 g/dL / Pro: 6.4 g/dL / ALK PHOS: 48 U/L / ALT: 24 U/L DA / AST: 46 U/L / GGT: x           PT/INR - ( 09 Oct 2018 16:47 )   PT: 11.0 sec;   INR: 0.99 ratio      PTT - ( 09 Oct 2018 16:47 )  PTT:25.6 sec    A/P:    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

## 2018-10-09 NOTE — PROGRESS NOTE ADULT - PROBLEM SELECTOR PLAN 2
patient w/ worsening nuero exam during times of hypertension( SBP 240s) Can not rule out since we do not have definitive MRI head, however given hypertension and DANIELLA strong possibility of RPLS  Continue w/ BP control for goal of 150-170

## 2018-10-09 NOTE — PROVIDER CONTACT NOTE (EICU) - ASSESSMENT
pupils L slightly bigger than R, with R pupil unreactive, L reactive.  L paresis (1/5), but now poorly responsive. No withdrawal to pain.  spO2 100% [RA], eyes slightly open.  Na 129 from 136 on admission  Cr 1.5, bicarb stable @25  Carotid doppler with no hemodynamically significant stenosis but extensive plaques.  Repeat head CT shows no intracranial hemorrhage, stable encephalomalacia at site of insult. pupils L slightly bigger than R, with R pupil unreactive, L reactive.  L paresis (1/5), but now poorly responsive. No withdrawal to pain.  spO2 100% [RA], eyes slightly open.  Na 129 from 136 on admission  Cr 1.5, bicarb stable @25  Carotid doppler with no hemodynamically significant stenosis but extensive plaques.  Repeat head CT shows no intracranial hemorrhage, stable encephalomalacia at site of insult.  Spoke with daughter over camera. She wants urgent transfer to [place] for neurointerventional intervention, and states she will take mother out AMA unless her wishes are met. I explained to her that she would need an accepting physician for transfer. Daughter reiterated she wants transfer immediately. I told her I would speak to the neurointerventionalist.

## 2018-10-09 NOTE — PROVIDER CONTACT NOTE (EICU) - RECOMMENDATIONS
Spoke to Dr Oden (neurointerventionalist at [facility]).  Recommend:  - fluid restriction / urine lytes, small dose 2% (100cc) to improve hyponatremia, repeat Na  - ABG R/O metabolic causes  - prolactin R/O seizure Spoke to Dr Oden (neurointerventionalist at [facility]).  Recommend:  - fluid restriction / urine lytes, small dose 2% (100cc) to improve hyponatremia, repeat Na  - ABG R/O metabolic causes  - prolactin R/O seizure  - No intervention warranted at this time.

## 2018-10-09 NOTE — PROVIDER CONTACT NOTE (OTHER) - ACTION/TREATMENT ORDERED:
continue to monitor 1) PCP Contacted on Admission: (Y/N) --> Name & Phone #: At [facility]   2) Date of Contact with PCP:  3) PCP Contacted at Discharge: (Y/N, N/A)  4) Summary of Handoff Given to PCP:   5) Post-Discharge Appointment Date and Location:

## 2018-10-09 NOTE — DIETITIAN INITIAL EVALUATION ADULT. - OTHER INFO
(+) BM (10/8) , Skin intact, (L) shin skin injury noted.-intact, patient noted hyponatremic, receiving sodium chloride.

## 2018-10-09 NOTE — PROGRESS NOTE ADULT - PROBLEM SELECTOR PROBLEM 2
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

## 2018-10-09 NOTE — PROGRESS NOTE ADULT - SUBJECTIVE AND OBJECTIVE BOX
Follow-up Critical Care Progress Note  Chief Complaint : Other symptom or sign involving musculoskeletal system    Overnight pt had episode of unresponsive and brought to ICU, DTR present overnight with patient  Workup reveal unchanged CT head, Hypoglycemia to 35 and hyponatremia.   brought to ICU for closer monitoring  overnight BP very labile hard to control as had episodes of SBP > 200 , and SBP < 120    Mental status improved from unresponsive to arousable at this time  DTR bedside, stating she wants transfer to Neuro unit, insisting that need stat MRI.   D/w DTR that patient has unexplained Hypoglycemia and hyponatremia with out overt cause , and this can cause AMS.  informed her at this time unable to provide transfer unless there is medical necessity for transfer.     Case d/w Neuro, Dr. Ibanez, who will be by, states no role in MRI at this time and hypontartemia/hypoglyemia can lead to this metabolic enceph.  will get EEG to r/o Seizure.       of note DTR also has bag of pills present in the room on the windowsill ,  however i did not go through her personal belongings to identify these pills      Allergies :ACE inhibitors (Other)  hydrALAZINE (Other)      PAST MEDICAL & SURGICAL HISTORY:  Hypertension, unspecified type  History of total hip replacement, right: 2014 at [facility],  No complications  S/p bilateral carotid endarterectomy: 2008 [facility], no complications  S/P AVR: 2008 at [facility], Dr. Bermudez.  No complications      Medications:  MEDICATIONS  (STANDING):  aspirin  chewable 81 milliGRAM(s) Oral daily  atorvastatin 40 milliGRAM(s) Oral at bedtime  cloNIDine Patch 0.1 mG/24Hr(s) 1 patch Transdermal every 7 days  dextrose 5% + sodium chloride 0.9%. 1000 milliLiter(s) (50 mL/Hr) IV Continuous <Continuous>  docusate sodium 100 milliGRAM(s) Oral two times a day  heparin  Injectable 5000 Unit(s) SubCutaneous every 12 hours  influenza   Vaccine 0.5 milliLiter(s) IntraMuscular once  losartan 50 milliGRAM(s) Oral every 12 hours  metoprolol succinate ER 50 milliGRAM(s) Oral every 12 hours  niCARdipine Infusion 5 mG/Hr (25 mL/Hr) IV Continuous <Continuous> on hold  pantoprazole    Tablet 40 milliGRAM(s) Oral daily  sodium chloride 3%. 500 milliLiter(s) (30 mL/Hr) IV Continuous <Continuous>    MEDICATIONS  (PRN):  acetaminophen   Tablet .. 650 milliGRAM(s) Oral every 6 hours PRN Mild Pain (1 - 3)      LABS:                        12.4   10.6  )-----------( 195      ( 09 Oct 2018 05:45 )             38.4     10-09    123<L>  |  93<L>  |  38<H>  ----------------------------<  72  4.7   |  21<L>  |  1.44<H>    Ca    9.1      09 Oct 2018 05:45  Phos  3.0     10-09  Mg     2.5     10-09      CULTURES: (if applicable)        ABG - ( 09 Oct 2018 01:30 )  pH, Arterial: 7.37  pH, Blood: x     /  pCO2: 34    /  pO2: 69    / HCO3: x     / Base Excess: x     /  SaO2: 93        Glucose Trend  10-09-18 @ 08:47   -  -- -- 106<H>  10-09-18 @ 05:45   -  -- 72 --  10-09-18 @ 05:43   -  -- -- 83  10-09-18 @ 02:43   -  -- -- 240<H>  10-09-18 @ 01:55   -  -- 35<LL> --  10-08-18 @ 06:00   -  -- 103<H> --  10-07-18 @ 05:32   -  -- 109<H> --    Hemoglobin A1C, Whole Blood: 5.7 % <H> (10-06-18 @ 05:30)          CAPILLARY BLOOD GLUCOSE      POCT Blood Glucose.: 106 mg/dL (09 Oct 2018 08:47)      RADIOLOGY  CXR:      CT:  < from: CT Head No Cont (10.08.18 @ 21:25) >  FINDINGS:    Brain:  No intracranial hemorrhage. Age related cerebral volume loss and findings likely related to chronic white matter ischemic disease. Stable encephalomalacia in the right frontal and occipital lobes compatible with   remote insult/injury. Stable old left cerebellar remote infarct. Old right basal ganglia lacunar infarcts.    Ventricles: Normal. No ventriculomegaly.    Bones/joints: Normal. No acute fracture.    Sinuses:  Mucosal thickening/partial opacification of the bilateral sphenoid sinuses which may be on the basis of sinusitis.    Mastoid air cells: Normal as visualized. No mastoid effusion.    Soft tissues: Normal.     IMPRESSION:   1. No intracranial hemorrhage.   2. Age related cerebral volume loss and findings likely related to chronic white matter ischemic disease.   3. Stable encephalomalacia in the right frontal and occipital lobes compatible with chronic injury, probable infarct.   4. Stable old left cerebellar remote infarct.   5. Old right basal ganglia lacunar infarcts.   6. Mucosal thickening/partial opacification of the bilateral sphenoid sinuses which may be on the basis of sinusitis.  < end of copied text >    ECHO:  < from: TTE Echo Complete w/Doppler (10.07.18 @ 09:03) >  Summary:   1. Left ventricular ejection fraction, by visual estimation, is 55 to 60%.   2. Normal global left ventricular systolic function.   3. Increased LV wall thickness.   4. Spectral Doppler shows impaired relaxation pattern of left ventricular myocardial filling (Grade I diastolic dysfunction).   5. There is mild concentric left ventricular hypertrophy.   6. Thickening of the anterior and posterior mitral valve leaflets.   7. Mild tricuspid regurgitation.   8. Mild aortic regurgitation.   9. Pulmonic valve regurgitation.  10. Structurally normal pulmonic valve, with normal leaflet excursion.  11. Estimated pulmonary artery systolic pressure is 43.8 mmHg assuming a right atrial pressure of 10 mmHg, which is consistent with mild pulmonary hypertension.  12. LA volume Index is 71.8 ml/m² ml/m2.  13. The aortic valve mean gradient is 6.7 mmHg consistent with normally opening aortic valve.    < end of copied text >    < from: US Duplex Carotid Arteries Complete, Bilateral (10.07.18 @ 12:54) >  IMPRESSION:    No evidence of hemodynamically significant stenosis by velocity measurements.    Measurement of carotid stenosis is based on velocity parameters that correlate the residual internal carotid diameter with that of the more distal vessel in accordance with a method such as the [employer]   Symptomatic Carotid Endarterectomy Trial (NASCET).     < end of copied text >    VITALS:  T(C): 37 (10-09-18 @ 09:30), Max: 37.2 (10-09-18 @ 06:00)  T(F): 98.6 (10-09-18 @ 09:30), Max: 98.9 (10-09-18 @ 06:00)  HR: 70 (10-09-18 @ 09:30) (52 - 119)  BP: 111/87 (10-09-18 @ 09:30) (104/44 - 218/116)  BP(mean): 95 (10-09-18 @ 09:30) (62 - 134)  ABP: --  ABP(mean): --  RR: 23 (10-09-18 @ 09:30) (16 - 23)  SpO2: 99% (10-09-18 @ 09:30) (98% - 100%)  CVP(mm Hg): --  CVP(cm H2O): --  SBP Now 150  Ins and Outs     10-08-18 @ 07:01  -  10-09-18 @ 07:00  --------------------------------------------------------  IN: 715 mL / OUT: 0 mL / NET: 715 mL    10-09-18 @ 07:01  -  10-09-18 @ 09:50  --------------------------------------------------------  IN: 100 mL / OUT: 0 mL / NET: 100 mL          Weight (kg): 57.8 (10-09-18 @ 06:00)

## 2018-10-10 LAB
ACTH SER-ACNC: 9 PG/ML — SIGNIFICANT CHANGE UP (ref 5–46)
ACTH SER-ACNC: <5 PG/ML — SIGNIFICANT CHANGE UP (ref 5–46)
ANION GAP SERPL CALC-SCNC: 4 MMOL/L — LOW (ref 5–17)
ANION GAP SERPL CALC-SCNC: 5 MMOL/L — SIGNIFICANT CHANGE UP (ref 5–17)
ANION GAP SERPL CALC-SCNC: 6 MMOL/L — SIGNIFICANT CHANGE UP (ref 5–17)
ANION GAP SERPL CALC-SCNC: 7 MMOL/L — SIGNIFICANT CHANGE UP (ref 5–17)
APPEARANCE UR: CLEAR — SIGNIFICANT CHANGE UP
BILIRUB UR-MCNC: NEGATIVE — SIGNIFICANT CHANGE UP
BUN SERPL-MCNC: 27 MG/DL — HIGH (ref 7–23)
BUN SERPL-MCNC: 28 MG/DL — HIGH (ref 7–23)
BUN SERPL-MCNC: 29 MG/DL — HIGH (ref 7–23)
BUN SERPL-MCNC: 30 MG/DL — HIGH (ref 7–23)
CALCIUM SERPL-MCNC: 8.1 MG/DL — LOW (ref 8.4–10.5)
CALCIUM SERPL-MCNC: 8.3 MG/DL — LOW (ref 8.4–10.5)
CALCIUM SERPL-MCNC: 8.4 MG/DL — SIGNIFICANT CHANGE UP (ref 8.4–10.5)
CALCIUM SERPL-MCNC: 8.5 MG/DL — SIGNIFICANT CHANGE UP (ref 8.4–10.5)
CHLORIDE SERPL-SCNC: 97 MMOL/L — SIGNIFICANT CHANGE UP (ref 96–108)
CHLORIDE SERPL-SCNC: 97 MMOL/L — SIGNIFICANT CHANGE UP (ref 96–108)
CHLORIDE SERPL-SCNC: 98 MMOL/L — SIGNIFICANT CHANGE UP (ref 96–108)
CHLORIDE SERPL-SCNC: 98 MMOL/L — SIGNIFICANT CHANGE UP (ref 96–108)
CO2 SERPL-SCNC: 23 MMOL/L — SIGNIFICANT CHANGE UP (ref 22–31)
CO2 SERPL-SCNC: 23 MMOL/L — SIGNIFICANT CHANGE UP (ref 22–31)
CO2 SERPL-SCNC: 24 MMOL/L — SIGNIFICANT CHANGE UP (ref 22–31)
CO2 SERPL-SCNC: 25 MMOL/L — SIGNIFICANT CHANGE UP (ref 22–31)
COLOR SPEC: YELLOW — SIGNIFICANT CHANGE UP
CORTIS AM PEAK SERPL-MCNC: 10.5 UG/DL — SIGNIFICANT CHANGE UP (ref 6–18.4)
CORTIS AM PEAK SERPL-MCNC: 9.4 UG/DL — SIGNIFICANT CHANGE UP (ref 6–18.4)
CREAT SERPL-MCNC: 1.31 MG/DL — HIGH (ref 0.5–1.3)
CREAT SERPL-MCNC: 1.4 MG/DL — HIGH (ref 0.5–1.3)
CREAT SERPL-MCNC: 1.42 MG/DL — HIGH (ref 0.5–1.3)
CREAT SERPL-MCNC: 1.53 MG/DL — HIGH (ref 0.5–1.3)
DIFF PNL FLD: NEGATIVE — SIGNIFICANT CHANGE UP
FSH SERPL-MCNC: 30.2 IU/L — SIGNIFICANT CHANGE UP
GLUCOSE SERPL-MCNC: 106 MG/DL — HIGH (ref 70–99)
GLUCOSE SERPL-MCNC: 118 MG/DL — HIGH (ref 70–99)
GLUCOSE SERPL-MCNC: 127 MG/DL — HIGH (ref 70–99)
GLUCOSE SERPL-MCNC: 157 MG/DL — HIGH (ref 70–99)
GLUCOSE UR QL: NEGATIVE — SIGNIFICANT CHANGE UP
HCT VFR BLD CALC: 32.6 % — LOW (ref 34.5–45)
HGB BLD-MCNC: 11.1 G/DL — LOW (ref 11.5–15.5)
KETONES UR-MCNC: NEGATIVE — SIGNIFICANT CHANGE UP
LEUKOCYTE ESTERASE UR-ACNC: NEGATIVE — SIGNIFICANT CHANGE UP
LH SERPL-ACNC: 41.1 IU/L — SIGNIFICANT CHANGE UP
MAGNESIUM SERPL-MCNC: 2 MG/DL — SIGNIFICANT CHANGE UP (ref 1.6–2.6)
MCHC RBC-ENTMCNC: 33.9 PG — SIGNIFICANT CHANGE UP (ref 27–34)
MCHC RBC-ENTMCNC: 34.1 GM/DL — SIGNIFICANT CHANGE UP (ref 32–36)
MCV RBC AUTO: 99.5 FL — SIGNIFICANT CHANGE UP (ref 80–100)
NITRITE UR-MCNC: NEGATIVE — SIGNIFICANT CHANGE UP
OSMOLALITY UR: 380 MOS/KG — SIGNIFICANT CHANGE UP (ref 50–1200)
PH UR: 6 — SIGNIFICANT CHANGE UP (ref 5–8)
PHOSPHATE SERPL-MCNC: 3.7 MG/DL — SIGNIFICANT CHANGE UP (ref 2.5–4.5)
PLATELET # BLD AUTO: 159 K/UL — SIGNIFICANT CHANGE UP (ref 150–400)
POTASSIUM SERPL-MCNC: 4.2 MMOL/L — SIGNIFICANT CHANGE UP (ref 3.5–5.3)
POTASSIUM SERPL-MCNC: 4.6 MMOL/L — SIGNIFICANT CHANGE UP (ref 3.5–5.3)
POTASSIUM SERPL-MCNC: 4.8 MMOL/L — SIGNIFICANT CHANGE UP (ref 3.5–5.3)
POTASSIUM SERPL-MCNC: 5.2 MMOL/L — SIGNIFICANT CHANGE UP (ref 3.5–5.3)
POTASSIUM SERPL-SCNC: 4.2 MMOL/L — SIGNIFICANT CHANGE UP (ref 3.5–5.3)
POTASSIUM SERPL-SCNC: 4.6 MMOL/L — SIGNIFICANT CHANGE UP (ref 3.5–5.3)
POTASSIUM SERPL-SCNC: 4.8 MMOL/L — SIGNIFICANT CHANGE UP (ref 3.5–5.3)
POTASSIUM SERPL-SCNC: 5.2 MMOL/L — SIGNIFICANT CHANGE UP (ref 3.5–5.3)
PROT UR-MCNC: NEGATIVE — SIGNIFICANT CHANGE UP
RBC # BLD: 3.28 M/UL — LOW (ref 3.8–5.2)
RBC # FLD: 12.2 % — SIGNIFICANT CHANGE UP (ref 10.3–14.5)
SODIUM SERPL-SCNC: 125 MMOL/L — LOW (ref 135–145)
SODIUM SERPL-SCNC: 127 MMOL/L — LOW (ref 135–145)
SODIUM SERPL-SCNC: 127 MMOL/L — LOW (ref 135–145)
SODIUM SERPL-SCNC: 128 MMOL/L — LOW (ref 135–145)
SP GR SPEC: 1.01 — SIGNIFICANT CHANGE UP (ref 1.01–1.02)
TSH SERPL-MCNC: 1.39 UIU/ML — SIGNIFICANT CHANGE UP (ref 0.27–4.2)
TSH SERPL-MCNC: 1.91 UIU/ML — SIGNIFICANT CHANGE UP (ref 0.27–4.2)
TSH SERPL-MCNC: 3.46 UIU/ML — SIGNIFICANT CHANGE UP (ref 0.27–4.2)
URATE SERPL-MCNC: 6.6 MG/DL — SIGNIFICANT CHANGE UP (ref 2.5–7)
UROBILINOGEN FLD QL: NEGATIVE — SIGNIFICANT CHANGE UP
WBC # BLD: 7.5 K/UL — SIGNIFICANT CHANGE UP (ref 3.8–10.5)
WBC # FLD AUTO: 7.5 K/UL — SIGNIFICANT CHANGE UP (ref 3.8–10.5)

## 2018-10-10 PROCEDURE — 99233 SBSQ HOSP IP/OBS HIGH 50: CPT

## 2018-10-10 RX ORDER — SODIUM CHLORIDE 9 MG/ML
1 INJECTION INTRAMUSCULAR; INTRAVENOUS; SUBCUTANEOUS DAILY
Qty: 0 | Refills: 0 | Status: DISCONTINUED | OUTPATIENT
Start: 2018-10-10 | End: 2018-10-10

## 2018-10-10 RX ORDER — SODIUM CHLORIDE 5 G/100ML
500 INJECTION, SOLUTION INTRAVENOUS
Qty: 0 | Refills: 0 | Status: DISCONTINUED | OUTPATIENT
Start: 2018-10-10 | End: 2018-10-10

## 2018-10-10 RX ORDER — SENNA PLUS 8.6 MG/1
2 TABLET ORAL ONCE
Qty: 0 | Refills: 0 | Status: COMPLETED | OUTPATIENT
Start: 2018-10-10 | End: 2018-10-10

## 2018-10-10 RX ORDER — TOLVAPTAN 15 MG/1
15 TABLET ORAL ONCE
Qty: 0 | Refills: 0 | Status: COMPLETED | OUTPATIENT
Start: 2018-10-10 | End: 2018-10-10

## 2018-10-10 RX ORDER — DIPHENHYDRAMINE HCL 50 MG
25 CAPSULE ORAL ONCE
Qty: 0 | Refills: 0 | Status: COMPLETED | OUTPATIENT
Start: 2018-10-10 | End: 2018-10-10

## 2018-10-10 RX ADMIN — HEPARIN SODIUM 5000 UNIT(S): 5000 INJECTION INTRAVENOUS; SUBCUTANEOUS at 05:05

## 2018-10-10 RX ADMIN — TOLVAPTAN 15 MILLIGRAM(S): 15 TABLET ORAL at 22:44

## 2018-10-10 RX ADMIN — Medication 81 MILLIGRAM(S): at 12:39

## 2018-10-10 RX ADMIN — Medication 100 MILLIGRAM(S): at 05:05

## 2018-10-10 RX ADMIN — PANTOPRAZOLE SODIUM 40 MILLIGRAM(S): 20 TABLET, DELAYED RELEASE ORAL at 12:39

## 2018-10-10 RX ADMIN — SENNA PLUS 2 TABLET(S): 8.6 TABLET ORAL at 22:48

## 2018-10-10 RX ADMIN — SODIUM CHLORIDE 30 MILLILITER(S): 5 INJECTION, SOLUTION INTRAVENOUS at 11:26

## 2018-10-10 RX ADMIN — Medication 25 MILLIGRAM(S): at 04:34

## 2018-10-10 RX ADMIN — Medication 0.1 MILLIGRAM(S): at 13:58

## 2018-10-10 RX ADMIN — Medication 650 MILLIGRAM(S): at 11:30

## 2018-10-10 RX ADMIN — Medication 10 MILLIGRAM(S): at 21:17

## 2018-10-10 RX ADMIN — Medication 0.1 MILLIGRAM(S): at 05:05

## 2018-10-10 RX ADMIN — HEPARIN SODIUM 5000 UNIT(S): 5000 INJECTION INTRAVENOUS; SUBCUTANEOUS at 18:27

## 2018-10-10 RX ADMIN — ATORVASTATIN CALCIUM 40 MILLIGRAM(S): 80 TABLET, FILM COATED ORAL at 21:17

## 2018-10-10 RX ADMIN — Medication 650 MILLIGRAM(S): at 03:30

## 2018-10-10 RX ADMIN — Medication 25 MILLIGRAM(S): at 05:05

## 2018-10-10 RX ADMIN — Medication 0.1 MILLIGRAM(S): at 21:15

## 2018-10-10 RX ADMIN — Medication 650 MILLIGRAM(S): at 10:41

## 2018-10-10 RX ADMIN — Medication 650 MILLIGRAM(S): at 03:02

## 2018-10-10 RX ADMIN — Medication 100 MILLIGRAM(S): at 18:26

## 2018-10-10 RX ADMIN — Medication 650 MILLIGRAM(S): at 22:30

## 2018-10-10 RX ADMIN — Medication 10 MILLIGRAM(S): at 11:26

## 2018-10-10 NOTE — PROGRESS NOTE ADULT - SUBJECTIVE AND OBJECTIVE BOX
Patient is a 89y old  Female who presents with a chief complaint of Weakness (09 Oct 2018 18:11)      BRIEF HOSPITAL COURSE:     Events last 24 hours: ***    PAST MEDICAL & SURGICAL HISTORY:  Hypertension, unspecified type  History of total hip replacement, right: 2014 at [facility],  No complications  S/p bilateral carotid endarterectomy: 2008 st [facility], no complications  S/P AVR: 2008 at [facility], Dr. Bermudez.  No complications      Review of Systems:  CONSTITUTIONAL: No fever, chills, or fatigue  EYES: No eye pain, visual disturbances, or discharge  ENMT:  No difficulty hearing, tinnitus, vertigo; No sinus or throat pain  NECK: No pain or stiffness  RESPIRATORY: No cough, wheezing, chills or hemoptysis; No shortness of breath  CARDIOVASCULAR: No chest pain, palpitations, dizziness, or leg swelling  GASTROINTESTINAL: No abdominal or epigastric pain. No nausea, vomiting, or hematemesis; No diarrhea or constipation. No melena or hematochezia.  GENITOURINARY: No dysuria, frequency, hematuria, or incontinence  NEUROLOGICAL: No headaches, memory loss, loss of strength, numbness, or tremors  SKIN: No itching, burning, rashes, or lesions   MUSCULOSKELETAL: No joint pain or swelling; No muscle, back, or extremity pain  PSYCHIATRIC: No depression, anxiety, mood swings, or difficulty sleeping      Medications:    cloNIDine 0.1 milliGRAM(s) Oral every 8 hours  metoprolol succinate ER 25 milliGRAM(s) Oral daily  niCARdipine Infusion 5 mG/Hr IV Continuous <Continuous>      acetaminophen   Tablet .. 650 milliGRAM(s) Oral every 6 hours PRN      aspirin  chewable 81 milliGRAM(s) Oral daily  heparin  Injectable 5000 Unit(s) SubCutaneous every 12 hours    docusate sodium 100 milliGRAM(s) Oral two times a day  pantoprazole    Tablet 40 milliGRAM(s) Oral daily      atorvastatin 40 milliGRAM(s) Oral at bedtime    dextrose 5% + sodium chloride 0.9%. 1000 milliLiter(s) IV Continuous <Continuous>  sodium chloride 3%. 500 milliLiter(s) IV Continuous <Continuous>                ICU Vital Signs Last 24 Hrs  T(C): 36.9 (09 Oct 2018 21:00), Max: 37.5 (09 Oct 2018 15:30)  T(F): 98.4 (09 Oct 2018 21:00), Max: 99.5 (09 Oct 2018 15:30)  HR: 63 (09 Oct 2018 22:00) (54 - 119)  BP: 175/53 (09 Oct 2018 22:00) (97/41 - 218/116)  BP(mean): 90 (09 Oct 2018 22:00) (57 - 152)  ABP: --  ABP(mean): --  RR: 22 (09 Oct 2018 22:00) (15 - 25)  SpO2: 98% (09 Oct 2018 22:00) (85% - 100%)      ABG - ( 09 Oct 2018 01:30 )  pH, Arterial: 7.37  pH, Blood: x     /  pCO2: 34    /  pO2: 69    / HCO3: x     / Base Excess: x     /  SaO2: 93                  I&O's Detail    08 Oct 2018 07:01  -  09 Oct 2018 07:00  --------------------------------------------------------  IN:    Oral Fluid: 535 mL    Solution: 100 mL    Solution: 80 mL  Total IN: 715 mL    OUT:  Total OUT: 0 mL    Total NET: 715 mL      09 Oct 2018 07:01  -  10 Oct 2018 01:06  --------------------------------------------------------  IN:    dextrose 5% + sodium chloride 0.9%: 150 mL    dextrose 5% + sodium chloride 0.9%: 450 mL    Oral Fluid: 240 mL    sodium chloride 3%.: 120 mL  Total IN: 960 mL    OUT:    Voided: 50 mL  Total OUT: 50 mL    Total NET: 910 mL            LABS:                        12.4   10.6  )-----------( 195      ( 09 Oct 2018 05:45 )             38.4     10-09    127<L>  |  98  |  32<H>  ----------------------------<  135<H>  4.5   |  24  |  1.58<H>    Ca    8.4      09 Oct 2018 22:00  Phos  3.0     10-09  Mg     2.2     10-09    TPro  6.4  /  Alb  2.5<L>  /  TBili  0.4  /  DBili  0.1  /  AST  46<H>  /  ALT  24  /  AlkPhos  48  10-09          CAPILLARY BLOOD GLUCOSE      POCT Blood Glucose.: 153 mg/dL (09 Oct 2018 22:08)    PT/INR - ( 09 Oct 2018 16:47 )   PT: 11.0 sec;   INR: 0.99 ratio         PTT - ( 09 Oct 2018 16:47 )  PTT:25.6 sec    CULTURES:      Physical Examination:    General: No acute distress.      HEENT: Pupils equal, reactive to light.  Symmetric.    PULM: Clear to auscultation bilaterally, no significant sputum production    NECK: Supple, no lymphadenopathy, trachea midline    CVS: Regular rate and rhythm, no murmurs, rubs, or gallops    ABD: Soft, nondistended, nontender, normoactive bowel sounds, no masses    EXT: No edema, nontender    SKIN: Warm and well perfused, no rashes noted.    NEURO: Alert, oriented, interactive, nonfocal    DEVICES:     RADIOLOGY: ***    CRITICAL CARE TIME SPENT: *** Patient is a 89y old  Female who presents with a chief complaint of Weakness (09 Oct 2018 18:11)      BRIEF HOSPITAL COURSE: 89 y/o female with pmhx of HTN, bilateral carotid endarterectomy in 2008, AVR in 2008 who presented on 10/5 with left upper extremity weakness and left sided facial droop. She had become symptomatic the day before but family decided to wait to see if symptoms improved. By time she came in to ED she was outside of window for tPA. In addition, she was hypertensive with systolic > 200. CT head showed evolving R MCA infarct. She was transferred out of the CCU on 10/7, then readmitted overnight 10/8-10/9 for worsening weakness, pupil discrepancy, and increasing lethargy as well as htn with sbp again > 200 requiring cardene infusion. [facility] neurointerventionalist recommended correcting hyponatremia and seeing if symptoms improved. repeat CT head showed stable infarct with no expansion or hemorrhagic conversion.     Events last 24 hours: Na stable at 127. currently receiving D5 NS. Patient's much more alert compared to previous notes. BP stable. neuro deficits without improvement.    PAST MEDICAL & SURGICAL HISTORY:  Hypertension, unspecified type  History of total hip replacement, right: 2014 at [facility],  No complications  S/p bilateral carotid endarterectomy: 2008 [facility], no complications  S/P AVR: 2008 at [facility], Dr. Bermudez.  No complications      Review of Systems:  CONSTITUTIONAL: No fever, chills, or fatigue  EYES: No eye pain, visual disturbances, or discharge  ENMT:  No difficulty hearing, tinnitus, vertigo; No sinus or throat pain  NECK: No pain or stiffness  RESPIRATORY: No cough, wheezing, chills or hemoptysis; No shortness of breath  CARDIOVASCULAR: No chest pain, palpitations, dizziness, or leg swelling  GASTROINTESTINAL: No abdominal or epigastric pain. No nausea, vomiting, or hematemesis; No diarrhea or constipation. No melena or hematochezia.  GENITOURINARY: No dysuria, frequency, hematuria, or incontinence  NEUROLOGICAL: + LUE weakness and L facial droop. No headaches, memory loss, numbness, or tremors  SKIN: No itching, burning, rashes, or lesions   MUSCULOSKELETAL: No joint pain or swelling; No muscle, back, or extremity pain  PSYCHIATRIC: No depression, anxiety, mood swings, or difficulty sleeping      Medications:    cloNIDine 0.1 milliGRAM(s) Oral every 8 hours  metoprolol succinate ER 25 milliGRAM(s) Oral daily  niCARdipine Infusion 5 mG/Hr IV Continuous <Continuous>      acetaminophen   Tablet .. 650 milliGRAM(s) Oral every 6 hours PRN      aspirin  chewable 81 milliGRAM(s) Oral daily  heparin  Injectable 5000 Unit(s) SubCutaneous every 12 hours    docusate sodium 100 milliGRAM(s) Oral two times a day  pantoprazole    Tablet 40 milliGRAM(s) Oral daily      atorvastatin 40 milliGRAM(s) Oral at bedtime    dextrose 5% + sodium chloride 0.9%. 1000 milliLiter(s) IV Continuous <Continuous>  sodium chloride 3%. 500 milliLiter(s) IV Continuous <Continuous>                ICU Vital Signs Last 24 Hrs  T(C): 36.9 (09 Oct 2018 21:00), Max: 37.5 (09 Oct 2018 15:30)  T(F): 98.4 (09 Oct 2018 21:00), Max: 99.5 (09 Oct 2018 15:30)  HR: 63 (09 Oct 2018 22:00) (54 - 119)  BP: 175/53 (09 Oct 2018 22:00) (97/41 - 218/116)  BP(mean): 90 (09 Oct 2018 22:00) (57 - 152)  ABP: --  ABP(mean): --  RR: 22 (09 Oct 2018 22:00) (15 - 25)  SpO2: 98% (09 Oct 2018 22:00) (85% - 100%)      ABG - ( 09 Oct 2018 01:30 )  pH, Arterial: 7.37  pH, Blood: x     /  pCO2: 34    /  pO2: 69    / HCO3: x     / Base Excess: x     /  SaO2: 93                  I&O's Detail    08 Oct 2018 07:01  -  09 Oct 2018 07:00  --------------------------------------------------------  IN:    Oral Fluid: 535 mL    Solution: 100 mL    Solution: 80 mL  Total IN: 715 mL    OUT:  Total OUT: 0 mL    Total NET: 715 mL      09 Oct 2018 07:01  -  10 Oct 2018 01:06  --------------------------------------------------------  IN:    dextrose 5% + sodium chloride 0.9%: 150 mL    dextrose 5% + sodium chloride 0.9%: 450 mL    Oral Fluid: 240 mL    sodium chloride 3%.: 120 mL  Total IN: 960 mL    OUT:    Voided: 50 mL  Total OUT: 50 mL    Total NET: 910 mL            LABS:                        12.4   10.6  )-----------( 195      ( 09 Oct 2018 05:45 )             38.4     10-09    127<L>  |  98  |  32<H>  ----------------------------<  135<H>  4.5   |  24  |  1.58<H>    Ca    8.4      09 Oct 2018 22:00  Phos  3.0     10-09  Mg     2.2     10-09    TPro  6.4  /  Alb  2.5<L>  /  TBili  0.4  /  DBili  0.1  /  AST  46<H>  /  ALT  24  /  AlkPhos  48  10-09          CAPILLARY BLOOD GLUCOSE      POCT Blood Glucose.: 153 mg/dL (09 Oct 2018 22:08)    PT/INR - ( 09 Oct 2018 16:47 )   PT: 11.0 sec;   INR: 0.99 ratio         PTT - ( 09 Oct 2018 16:47 )  PTT:25.6 sec    CULTURES:      Physical Examination:    General: No acute distress.  resting comfortably in bed.    HEENT: Pupils equal, reactive to light.  Symmetric, ~3 mm.    PULM: Clear to auscultation bilaterally, no significant sputum production    NECK: Supple, no lymphadenopathy, trachea midline    CVS: Regular rate and rhythm, no murmurs, rubs, or gallops    ABD: Soft, nondistended, nontender, normoactive bowel sounds, no masses    EXT: No edema, nontender    SKIN: Warm and well perfused, no rashes noted.    NEURO: Alert, oriented, interactive, nonfocal. LUE 0/5 strength with very minimal digit movement when asked to squeeze hand. RUE strength 5/5. 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 strength 5/5 in bilateral lower extremities.     DEVICES:     RADIOLOGY:     EXAM:  CT BRAIN      PROCEDURE DATE:  10/08/2018        INTERPRETATION:    EXAM:    CT Head Without Intravenous Contrast     EXAM DATE/TIME:    10/8/2018 9:02 PM     CLINICAL HISTORY:    89 years old, female; Signs and symptoms; Weakness, facial     TECHNIQUE:    Axial computed tomography images of the head/brain without intravenous   contrast.    Coronal and sagittal reformatted images were created and reviewed.     COMPARISON:    CT HEAD 10/6/2018 10:53 AM     FINDINGS:    Brain:  No intracranial hemorrhage. Age related cerebral volume loss and   findings likely related to chronic white matter ischemic disease. Stable   encephalomalacia in the right frontal and occipital lobes compatible with   remote insult/injury. Stable old left cerebellar remote infarct. Old   right   basal ganglia lacunar infarcts.    Ventricles: Normal. No ventriculomegaly.    Bones/joints: Normal. No acute fracture.    Sinuses:  Mucosal thickening/partial opacification of the bilateral   sphenoid   sinuses which may be on the basis of sinusitis.    Mastoid air cells: Normal as visualized. No mastoid effusion.    Soft tissues: Normal.     IMPRESSION:   1. No intracranial hemorrhage.   2. Age related cerebral volume loss and findings likely related to   chronic   white matter ischemic disease.   3. Stable encephalomalacia in the right frontal and occipital lobes   compatible   with chronic injury, probable infarct.   4. Stable old left cerebellar remote infarct.   5. Old right basal ganglia lacunar infarcts.   6. Mucosal thickening/partial opacification of the bilateral sphenoid   sinuses   which may be on the basis of sinusitis.    Preliminary report provided by Staci Lamb M.D.  [facility] RADIOLOGIST          QUIQUE BACON   This document has been electronically signed. Oct  9 2018  8:06AM

## 2018-10-10 NOTE — PROGRESS NOTE ADULT - PROBLEM SELECTOR PLAN 1
-neuro exam currently stable.   -neurology following  -may get transferred to [facility] per family wishes

## 2018-10-10 NOTE — PROGRESS NOTE ADULT - SUBJECTIVE AND OBJECTIVE BOX
Seen in ICU, awake, follows commands    Vital Signs Last 24 Hrs  T(C): 36.9 (10-10-18 @ 17:00), Max: 37 (10-10-18 @ 13:00)  T(F): 98.4 (10-10-18 @ 17:00), Max: 98.6 (10-10-18 @ 13:00)  HR: 63 (10-10-18 @ 18:00) (51 - 65)  BP: 161/61 (10-10-18 @ 18:00) (65/45 - 187/67)  BP(mean): 90 (10-10-18 @ 18:00) (53 - 108)  RR: 17 (10-10-18 @ 18:00) (12 - 22)  SpO2: 100% (10-10-18 @ 18:00) (83% - 100%)    Lungs good air entry b/l  Abd soft, ND  Extr no edema  Skin no rash                        11.1   7.5   )-----------( 159      ( 10 Oct 2018 05:00 )             32.6     10 Oct 2018 16:00    127    |  98     |  29     ----------------------------<  106    4.8     |  25     |  1.31     Ca    8.3        10 Oct 2018 16:00  Phos  3.7       10 Oct 2018 05:00  Mg     2.0       10 Oct 2018 05:00    TPro  6.4    /  Alb  2.5    /  TBili  0.4    /  DBili  0.1    /  AST  46     /  ALT  24     /  AlkPhos  48     09 Oct 2018 16:47    LIVER FUNCTIONS - ( 09 Oct 2018 16:47 )  Alb: 2.5 g/dL / Pro: 6.4 g/dL / ALK PHOS: 48 U/L / ALT: 24 U/L DA / AST: 46 U/L / GGT: x           PT/INR - ( 09 Oct 2018 16:47 )   PT: 11.0 sec;   INR: 0.99 ratio      PTT - ( 09 Oct 2018 16:47 )  PTT:25.6 sec  CAPILLARY BLOOD GLUCOSE    POCT Blood Glucose.: 153 mg/dL (09 Oct 2018 22:08)    Urinalysis Basic - ( 10 Oct 2018 17:30 )    Color: Yellow / Appearance: Clear / S.015 / pH: x  Gluc: x / Ketone: Negative  / Bili: Negative / Urobili: Negative   Blood: x / Protein: Negative / Nitrite: Negative   Leuk Esterase: Negative / RBC: x / WBC x   Sq Epi: x / Non Sq Epi: x / Bacteria: x    acetaminophen   Tablet .. 650 milliGRAM(s) Oral every 6 hours PRN  aspirin  chewable 81 milliGRAM(s) Oral daily  atorvastatin 40 milliGRAM(s) Oral at bedtime  cloNIDine 0.1 milliGRAM(s) Oral every 8 hours  docusate sodium 100 milliGRAM(s) Oral two times a day  heparin  Injectable 5000 Unit(s) SubCutaneous every 12 hours  metoprolol succinate ER 25 milliGRAM(s) Oral daily  pantoprazole    Tablet 40 milliGRAM(s) Oral daily  predniSONE   Tablet 10 milliGRAM(s) Oral every 12 hours  senna 2 Tablet(s) Oral once  sodium chloride 3%. 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

## 2018-10-10 NOTE — PROGRESS NOTE ADULT - ASSESSMENT
89 y/o female with pmhx of HTN, bilateral carotid endarterectomy in 2008, AVR in 2008 now with persistent neurologic deficits secondary to R MCA acute CVA with hospital course complicated by hyponatremia possibly caused by SIADH from neuro event.

## 2018-10-10 NOTE — PROGRESS NOTE ADULT - ASSESSMENT
Physical Examination:  GENERAL:               Alert responsive, No acute distress.    HEENT:                 not opening eyes for exam  Symmetric. No JVD, Moist MM  PULM:                     Bilateral air entry, Clear to auscultation bilaterally, no significant sputum production, No Rales, No Rhonchi, No Wheezing  CVS:                         S1, S2,  +Murmur  ABD:                        Soft, nondistended, nontender, normoactive bowel sounds,   EXT:                         No edema, nontender, No Cyanosis or Clubbing   Vascular:                Warm Extremities, Normal Capillary refill, Normal Distal Pulses  SKIN:                       Warm and well perfused, no rashes noted.   NEURO:                  Alert , interactive, Left HP with 2/5 LUE - LLE 4/5 Right side 5/5, left hemineglect   PSYC:                      Calm, No Insight.        Assessment  Metabolic encephelopathy ; improving   acute hyponatremia ? SIADH vs Cerebral Salt Waisting   acute hypoglycemia unsure of etiology at this time despite being fed by DTR.   Acute CVA with Left HP , not a candidate for tpa on presentation r/o cardio embolic as multiple locations on ct.  now with labile BP, in pt with underlying HTN  uncontrolled bp, HTN  VHD s/p AVR  no known history of DM2 -Hemoglobin A1C, Whole Blood: 5.7 %        Plan   D/W renal another round of hypertonic saline today  D5 NS for hypoglycemia D/W Endo, start prednisone 10mg bid x 3 days, and repeat acth, cortisol, lh, fsh  BP control keep < 180, BP control   asa, statin  1:1 observation to continue  supportive care .    Family Updated: 	DTR	     bedside                            Date: 10/10    Sedation & Analgesia:	on hold  Diet/Nutrition:		as tolerated, advance as per mental status  GI PPx:			PPI    DVT Ppx:		Heparin    Disposition: ICU monitoring with hypertonic saline    Goals of Care: Full Code.

## 2018-10-10 NOTE — PROGRESS NOTE ADULT - SUBJECTIVE AND OBJECTIVE BOX
Follow-up Critical Care Progress Note  Chief Complaint : Other symptom or sign involving musculoskeletal system    pt more alert       Allergies :ACE inhibitors (Other)  hydrALAZINE (Other)      PAST MEDICAL & SURGICAL HISTORY:  Hypertension, unspecified type  History of total hip replacement, right: 2014 at [facility],  No complications  S/p bilateral carotid endarterectomy: 2008 st [facility], no complications  S/P AVR: 2008 at [facility], Dr. Bermudez.  No complications      Medications:  MEDICATIONS  (STANDING):  aspirin  chewable 81 milliGRAM(s) Oral daily  atorvastatin 40 milliGRAM(s) Oral at bedtime  cloNIDine 0.1 milliGRAM(s) Oral every 8 hours  docusate sodium 100 milliGRAM(s) Oral two times a day  heparin  Injectable 5000 Unit(s) SubCutaneous every 12 hours  metoprolol succinate ER 25 milliGRAM(s) Oral daily  pantoprazole    Tablet 40 milliGRAM(s) Oral daily  predniSONE   Tablet 10 milliGRAM(s) Oral every 12 hours  sodium chloride 3%. 500 milliLiter(s) (30 mL/Hr) IV Continuous <Continuous>    MEDICATIONS  (PRN):  acetaminophen   Tablet .. 650 milliGRAM(s) Oral every 6 hours PRN Temp greater or equal to 38C (100.4F), Moderate Pain (4 - 6)      LABS:                        11.1   7.5   )-----------( 159      ( 10 Oct 2018 05:00 )             32.6     10-10    128<L>  |  98  |  27<H>  ----------------------------<  118<H>  4.2   |  24  |  1.53<H>    Ca    8.1<L>      10 Oct 2018 10:30  Phos  3.7     10-10  Mg     2.0     10-10    TPro  6.4  /  Alb  2.5<L>  /  TBili  0.4  /  DBili  0.1  /  AST  46<H>  /  ALT  24  /  AlkPhos  48  10-09            PT/INR - ( 09 Oct 2018 16:47 )   PT: 11.0 sec;   INR: 0.99 ratio         PTT - ( 09 Oct 2018 16:47 )  PTT:25.6 sec            CULTURES: (if applicable)        ABG - ( 09 Oct 2018 01:30 )  pH, Arterial: 7.37  pH, Blood: x     /  pCO2: 34    /  pO2: 69    / HCO3: x     / Base Excess: x     /  SaO2: 93                CAPILLARY BLOOD GLUCOSE      POCT Blood Glucose.: 153 mg/dL (09 Oct 2018 22:08)          VITALS:  T(C): 36.9 (10-09-18 @ 21:00), Max: 37.5 (10-09-18 @ 15:30)  T(F): 98.4 (10-09-18 @ 21:00), Max: 99.5 (10-09-18 @ 15:30)  HR: 59 (10-10-18 @ 09:00) (51 - 66)  BP: 167/51 (10-10-18 @ 09:00) (65/45 - 187/67)  BP(mean): 86 (10-10-18 @ 09:00) (53 - 152)  ABP: --  ABP(mean): --  RR: 20 (10-10-18 @ 09:00) (12 - 25)  SpO2: 97% (10-10-18 @ 09:00) (83% - 100%)  CVP(mm Hg): --  CVP(cm H2O): --    Ins and Outs     10-09-18 @ 07:01  -  10-10-18 @ 07:00  --------------------------------------------------------  IN: 1000 mL / OUT: 50 mL / NET: 950 mL    10-10-18 @ 07:01  -  10-10-18 @ 12:01  --------------------------------------------------------  IN: 400 mL / OUT: 0 mL / NET: 400 mL          Weight (kg): 57.8 (10-10-18 @ 10:43)

## 2018-10-10 NOTE — PROGRESS NOTE ADULT - SUBJECTIVE AND OBJECTIVE BOX
SUBJ:  Patient is a 89y old  Female who presents with a chief complaint of Weakness (10 Oct 2018 09:41)    she is in bed, lethargic, arousable     PAST MEDICAL & SURGICAL HISTORY:  Hypertension, unspecified type  History of total hip replacement, right: 2014 at [facility],  No complications  S/p bilateral carotid endarterectomy: 2008 st [facility], no complications  S/P AVR: 2008 at [facility], Dr. Bermudez.  No complications      MEDICATIONS  (STANDING):  aspirin  chewable 81 milliGRAM(s) Oral daily  atorvastatin 40 milliGRAM(s) Oral at bedtime  cloNIDine 0.1 milliGRAM(s) Oral every 8 hours  dextrose 5% + sodium chloride 0.9%. 1000 milliLiter(s) (40 mL/Hr) IV Continuous <Continuous>  docusate sodium 100 milliGRAM(s) Oral two times a day  heparin  Injectable 5000 Unit(s) SubCutaneous every 12 hours  metoprolol succinate ER 25 milliGRAM(s) Oral daily  niCARdipine Infusion 5 mG/Hr (25 mL/Hr) IV Continuous <Continuous>  pantoprazole    Tablet 40 milliGRAM(s) Oral daily  predniSONE   Tablet 10 milliGRAM(s) Oral every 12 hours  sodium chloride 3%. 500 milliLiter(s) (30 mL/Hr) IV Continuous <Continuous>    MEDICATIONS  (PRN):  acetaminophen   Tablet .. 650 milliGRAM(s) Oral every 6 hours PRN Temp greater or equal to 38C (100.4F), Moderate Pain (4 - 6)          Vital Signs Last 24 Hrs  T(C): 36.9 (09 Oct 2018 21:00), Max: 37.5 (09 Oct 2018 15:30)  T(F): 98.4 (09 Oct 2018 21:00), Max: 99.5 (09 Oct 2018 15:30)  HR: 59 (10 Oct 2018 09:00) (51 - 68)  BP: 167/51 (10 Oct 2018 09:00) (65/45 - 187/67)  BP(mean): 86 (10 Oct 2018 09:00) (53 - 152)  RR: 20 (10 Oct 2018 09:00) (12 - 25)  SpO2: 97% (10 Oct 2018 09:00) (83% - 100%)    REVIEW OF SYSTEMS:  CONSTITUTIONAL: feeling weak   RESPIRATORY: No cough, wheezing, chills or hemoptysis; No shortness of breath  CARDIOVASCULAR: No chest pain or chest pressure.  No shortness of breath or dyspnea on exertion.  No palpitations, dizziness, light headedness, syncope or near syncope.  No edema, no orthopnea.   NEUROLOGICAL:  left arm weakness       PHYSICAL EXAM  Constitutional:  frail elderly woman, no distress   HEENT: normocephalic, atraumatic.  PERRLA. EOMI  Neck : No JVD. no carotid bruits  Lungs:  clear to auscultation bilaterally. no rhonchi. no wheezing  Heart:  S1 and S2. No S3, S4. II/VI systolic murmur.  Abdomen:  soft, non tender.  Extremities: No clubbing, cyanoisis or edema  Nuerologic:  A+O x 3. No focal deficits  Skin:  no rashes    TELEMETRY SR    ECG < from: 12 Lead ECG (10.05.18 @ 10:58) >  Normal sinus rhythm  LVH , nstwc  Borderline ECG  No previous ECGs available    < end of copied text >      TTE < from: TTE Echo Complete w/Doppler (10.07.18 @ 09:03) >   1. Left ventricular ejection fraction, by visual estimation, is 55 to   60%.   2. Normal global left ventricular systolic function.   3. Increased LV wall thickness.   4. Spectral Doppler shows impaired relaxation pattern of left   ventricular myocardial filling (Grade I diastolic dysfunction).   5. There is mild concentric left ventricular hypertrophy.   6. Thickening of the anterior and posterior mitral valve leaflets.   7. Mild tricuspid regurgitation.   8. Mild aortic regurgitation.   9. Pulmonic valve regurgitation.  10. Structurally normal pulmonic valve, with normal leaflet excursion.  11. Estimated pulmonary artery systolic pressure is 43.8 mmHg assuming a   right atrial pressure of 10 mmHg, which is consistent with mild pulmonary   hypertension.  12. LA volume Index is 71.8 ml/m² ml/m2.  13. The aortic valve mean gradient is 6.7 mmHg consistent with normally   opening aortic valve.    < end of copied text >      LABS:                        11.1   7.5   )-----------( 159      ( 10 Oct 2018 05:00 )             32.6     10-10    127<L>  |  97  |  30<H>  ----------------------------<  127<H>  4.6   |  23  |  1.42<H>    Ca    8.5      10 Oct 2018 05:00  Phos  3.7     10-10  Mg     2.0     10-10    TPro  6.4  /  Alb  2.5<L>  /  TBili  0.4  /  DBili  0.1  /  AST  46<H>  /  ALT  24  /  AlkPhos  48  10-09            acetaminophen   Tablet .. 650 milliGRAM(s) Oral every 6 hours PRN  aspirin  chewable 81 milliGRAM(s) Oral daily  atorvastatin 40 milliGRAM(s) Oral at bedtime  cloNIDine 0.1 milliGRAM(s) Oral every 8 hours  dextrose 5% + sodium chloride 0.9%. 1000 milliLiter(s) IV Continuous <Continuous>  docusate sodium 100 milliGRAM(s) Oral two times a day  heparin  Injectable 5000 Unit(s) SubCutaneous every 12 hours  metoprolol succinate ER 25 milliGRAM(s) Oral daily  niCARdipine Infusion 5 mG/Hr IV Continuous <Continuous>  pantoprazole    Tablet 40 milliGRAM(s) Oral daily  predniSONE   Tablet 10 milliGRAM(s) Oral every 12 hours  sodium chloride 3%. 500 milliLiter(s) IV Continuous <Continuous>    I&O's Summary    09 Oct 2018 07:01  -  10 Oct 2018 07:00  --------------------------------------------------------  IN: 1000 mL / OUT: 50 mL / NET: 950 mL    10 Oct 2018 07:01  -  10 Oct 2018 10:43  --------------------------------------------------------  IN: 400 mL / OUT: 0 mL / NET: 400 mL

## 2018-10-10 NOTE — CONSULT NOTE ADULT - ASSESSMENT
89 yrs. old female now in ICU with one episode of hypoglycemia. Pt. has no h/o DM or thyroid disease or adrenal disorder before.  H/o SIADH with hyponatremia, CKD 4, with poor renal conservation Na , losing Na in urine.  low insulin & c-peptide , unlikely Pt. got insulin or sulfonyl urea drugs or insulinoma.  R/o partrial adrenal insufficency .  suggest: acth., cortisol, FSH LH levels now.  trial of small doses of Prednisone 10 mg bid , tapering down later.  discussed with PT. daughter at bedside.and answered her questions.

## 2018-10-10 NOTE — PROGRESS NOTE ADULT - ASSESSMENT
89 year old woman admitted with generalized weakness, left arm weakness.  electrolyte abnormalities, hyponatremia   She has history of HTN (poorly controlled), s/p remote AVR, carotid endarterectomy.   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 Continue to monitor BP and continue clonidine and metoprolol  2. Continue to monitor for AF.... remains in sinus rhythm, no AF thus far documented.  She will follow up with primary cardiologist post discharge and consider long term monitor

## 2018-10-11 LAB
ANION GAP SERPL CALC-SCNC: 6 MMOL/L — SIGNIFICANT CHANGE UP (ref 5–17)
ANION GAP SERPL CALC-SCNC: 8 MMOL/L — SIGNIFICANT CHANGE UP (ref 5–17)
ANION GAP SERPL CALC-SCNC: 9 MMOL/L — SIGNIFICANT CHANGE UP (ref 5–17)
BUN SERPL-MCNC: 28 MG/DL — HIGH (ref 7–23)
BUN SERPL-MCNC: 30 MG/DL — HIGH (ref 7–23)
BUN SERPL-MCNC: 33 MG/DL — HIGH (ref 7–23)
CALCIUM SERPL-MCNC: 8.5 MG/DL — SIGNIFICANT CHANGE UP (ref 8.4–10.5)
CALCIUM SERPL-MCNC: 8.5 MG/DL — SIGNIFICANT CHANGE UP (ref 8.4–10.5)
CALCIUM SERPL-MCNC: 9 MG/DL — SIGNIFICANT CHANGE UP (ref 8.4–10.5)
CHLORIDE SERPL-SCNC: 100 MMOL/L — SIGNIFICANT CHANGE UP (ref 96–108)
CHLORIDE SERPL-SCNC: 98 MMOL/L — SIGNIFICANT CHANGE UP (ref 96–108)
CHLORIDE SERPL-SCNC: 98 MMOL/L — SIGNIFICANT CHANGE UP (ref 96–108)
CO2 SERPL-SCNC: 21 MMOL/L — LOW (ref 22–31)
CO2 SERPL-SCNC: 22 MMOL/L — SIGNIFICANT CHANGE UP (ref 22–31)
CO2 SERPL-SCNC: 23 MMOL/L — SIGNIFICANT CHANGE UP (ref 22–31)
CREAT SERPL-MCNC: 1.37 MG/DL — HIGH (ref 0.5–1.3)
CREAT SERPL-MCNC: 1.41 MG/DL — HIGH (ref 0.5–1.3)
CREAT SERPL-MCNC: 1.45 MG/DL — HIGH (ref 0.5–1.3)
GLUCOSE SERPL-MCNC: 144 MG/DL — HIGH (ref 70–99)
GLUCOSE SERPL-MCNC: 147 MG/DL — HIGH (ref 70–99)
GLUCOSE SERPL-MCNC: 196 MG/DL — HIGH (ref 70–99)
HCT VFR BLD CALC: 32 % — LOW (ref 34.5–45)
HGB BLD-MCNC: 10.8 G/DL — LOW (ref 11.5–15.5)
MAGNESIUM SERPL-MCNC: 1.7 MG/DL — SIGNIFICANT CHANGE UP (ref 1.6–2.6)
MCHC RBC-ENTMCNC: 33.6 GM/DL — SIGNIFICANT CHANGE UP (ref 32–36)
MCHC RBC-ENTMCNC: 33.7 PG — SIGNIFICANT CHANGE UP (ref 27–34)
MCV RBC AUTO: 100.2 FL — HIGH (ref 80–100)
OSMOLALITY UR: 342 MOS/KG — SIGNIFICANT CHANGE UP (ref 50–1200)
PHOSPHATE SERPL-MCNC: 4 MG/DL — SIGNIFICANT CHANGE UP (ref 2.5–4.5)
PLATELET # BLD AUTO: 163 K/UL — SIGNIFICANT CHANGE UP (ref 150–400)
POTASSIUM SERPL-MCNC: 5.1 MMOL/L — SIGNIFICANT CHANGE UP (ref 3.5–5.3)
POTASSIUM SERPL-MCNC: 5.1 MMOL/L — SIGNIFICANT CHANGE UP (ref 3.5–5.3)
POTASSIUM SERPL-MCNC: 5.5 MMOL/L — HIGH (ref 3.5–5.3)
POTASSIUM SERPL-SCNC: 5.1 MMOL/L — SIGNIFICANT CHANGE UP (ref 3.5–5.3)
POTASSIUM SERPL-SCNC: 5.1 MMOL/L — SIGNIFICANT CHANGE UP (ref 3.5–5.3)
POTASSIUM SERPL-SCNC: 5.5 MMOL/L — HIGH (ref 3.5–5.3)
RBC # BLD: 3.19 M/UL — LOW (ref 3.8–5.2)
RBC # FLD: 11.9 % — SIGNIFICANT CHANGE UP (ref 10.3–14.5)
SODIUM SERPL-SCNC: 128 MMOL/L — LOW (ref 135–145)
SODIUM SERPL-SCNC: 128 MMOL/L — LOW (ref 135–145)
SODIUM SERPL-SCNC: 129 MMOL/L — LOW (ref 135–145)
WBC # BLD: 5.7 K/UL — SIGNIFICANT CHANGE UP (ref 3.8–10.5)
WBC # FLD AUTO: 5.7 K/UL — SIGNIFICANT CHANGE UP (ref 3.8–10.5)

## 2018-10-11 PROCEDURE — 99232 SBSQ HOSP IP/OBS MODERATE 35: CPT

## 2018-10-11 RX ORDER — METOPROLOL TARTRATE 50 MG
50 TABLET ORAL
Qty: 0 | Refills: 0 | Status: DISCONTINUED | OUTPATIENT
Start: 2018-10-11 | End: 2018-10-11

## 2018-10-11 RX ORDER — SENNA PLUS 8.6 MG/1
2 TABLET ORAL AT BEDTIME
Qty: 0 | Refills: 0 | Status: DISCONTINUED | OUTPATIENT
Start: 2018-10-11 | End: 2018-10-18

## 2018-10-11 RX ORDER — POLYETHYLENE GLYCOL 3350 17 G/17G
17 POWDER, FOR SOLUTION ORAL
Qty: 0 | Refills: 0 | Status: DISCONTINUED | OUTPATIENT
Start: 2018-10-11 | End: 2018-10-18

## 2018-10-11 RX ORDER — MAGNESIUM SULFATE 500 MG/ML
2 VIAL (ML) INJECTION ONCE
Qty: 0 | Refills: 0 | Status: DISCONTINUED | OUTPATIENT
Start: 2018-10-11 | End: 2018-10-11

## 2018-10-11 RX ORDER — MAGNESIUM SULFATE 500 MG/ML
1 VIAL (ML) INJECTION
Qty: 0 | Refills: 0 | Status: COMPLETED | OUTPATIENT
Start: 2018-10-11 | End: 2018-10-11

## 2018-10-11 RX ORDER — TOLVAPTAN 15 MG/1
15 TABLET ORAL ONCE
Qty: 0 | Refills: 0 | Status: COMPLETED | OUTPATIENT
Start: 2018-10-11 | End: 2018-10-11

## 2018-10-11 RX ORDER — METOPROLOL TARTRATE 50 MG
25 TABLET ORAL
Qty: 0 | Refills: 0 | Status: DISCONTINUED | OUTPATIENT
Start: 2018-10-11 | End: 2018-10-11

## 2018-10-11 RX ORDER — METOPROLOL TARTRATE 50 MG
25 TABLET ORAL
Qty: 0 | Refills: 0 | Status: DISCONTINUED | OUTPATIENT
Start: 2018-10-11 | End: 2018-10-12

## 2018-10-11 RX ORDER — DOCUSATE SODIUM 100 MG
100 CAPSULE ORAL THREE TIMES A DAY
Qty: 0 | Refills: 0 | Status: DISCONTINUED | OUTPATIENT
Start: 2018-10-11 | End: 2018-10-18

## 2018-10-11 RX ADMIN — Medication 0.1 MILLIGRAM(S): at 22:06

## 2018-10-11 RX ADMIN — HEPARIN SODIUM 5000 UNIT(S): 5000 INJECTION INTRAVENOUS; SUBCUTANEOUS at 17:18

## 2018-10-11 RX ADMIN — PANTOPRAZOLE SODIUM 40 MILLIGRAM(S): 20 TABLET, DELAYED RELEASE ORAL at 11:36

## 2018-10-11 RX ADMIN — Medication 25 MILLIGRAM(S): at 05:29

## 2018-10-11 RX ADMIN — Medication 650 MILLIGRAM(S): at 08:35

## 2018-10-11 RX ADMIN — Medication 100 MILLIGRAM(S): at 05:29

## 2018-10-11 RX ADMIN — Medication 650 MILLIGRAM(S): at 00:41

## 2018-10-11 RX ADMIN — Medication 0.1 MILLIGRAM(S): at 05:28

## 2018-10-11 RX ADMIN — HEPARIN SODIUM 5000 UNIT(S): 5000 INJECTION INTRAVENOUS; SUBCUTANEOUS at 05:29

## 2018-10-11 RX ADMIN — Medication 81 MILLIGRAM(S): at 11:36

## 2018-10-11 RX ADMIN — Medication 650 MILLIGRAM(S): at 09:35

## 2018-10-11 RX ADMIN — Medication 100 MILLIGRAM(S): at 22:06

## 2018-10-11 RX ADMIN — TOLVAPTAN 15 MILLIGRAM(S): 15 TABLET ORAL at 12:14

## 2018-10-11 RX ADMIN — Medication 100 GRAM(S): at 09:12

## 2018-10-11 RX ADMIN — Medication 100 GRAM(S): at 07:51

## 2018-10-11 RX ADMIN — Medication 10 MILLIGRAM(S): at 17:18

## 2018-10-11 RX ADMIN — Medication 10 MILLIGRAM(S): at 05:29

## 2018-10-11 RX ADMIN — ATORVASTATIN CALCIUM 40 MILLIGRAM(S): 80 TABLET, FILM COATED ORAL at 22:06

## 2018-10-11 RX ADMIN — POLYETHYLENE GLYCOL 3350 17 GRAM(S): 17 POWDER, FOR SOLUTION ORAL at 17:18

## 2018-10-11 RX ADMIN — Medication 100 MILLIGRAM(S): at 13:27

## 2018-10-11 RX ADMIN — SENNA PLUS 2 TABLET(S): 8.6 TABLET ORAL at 22:06

## 2018-10-11 NOTE — PROGRESS NOTE ADULT - SUBJECTIVE AND OBJECTIVE BOX
Follow up for rule out afib  SUBJ:    comfortable pca at bedside    PMH  Hypertension, unspecified type      MEDICATIONS  (STANDING):  aspirin  chewable 81 milliGRAM(s) Oral daily  atorvastatin 40 milliGRAM(s) Oral at bedtime  cloNIDine 0.1 milliGRAM(s) Oral every 8 hours  docusate sodium 100 milliGRAM(s) Oral three times a day  heparin  Injectable 5000 Unit(s) SubCutaneous every 12 hours  metoprolol tartrate 50 milliGRAM(s) Oral two times a day  pantoprazole    Tablet 40 milliGRAM(s) Oral daily  polyethylene glycol 3350 17 Gram(s) Oral two times a day  predniSONE   Tablet 10 milliGRAM(s) Oral every 12 hours  senna 2 Tablet(s) Oral at bedtime    MEDICATIONS  (PRN):  acetaminophen   Tablet .. 650 milliGRAM(s) Oral every 6 hours PRN Temp greater or equal to 38C (100.4F), Moderate Pain (4 - 6)        PHYSICAL EXAM:  Vital Signs Last 24 Hrs  T(C): 36.6 (11 Oct 2018 15:00), Max: 37 (10 Oct 2018 22:00)  T(F): 97.9 (11 Oct 2018 15:00), Max: 98.6 (10 Oct 2018 22:00)  HR: 53 (11 Oct 2018 16:00) (51 - 63)  BP: 131/40 (11 Oct 2018 16:00) (107/47 - 228/85)  BP(mean): 67 (11 Oct 2018 16:00) (65 - 133)  RR: 18 (11 Oct 2018 16:00) (15 - 25)  SpO2: 95% (11 Oct 2018 16:00) (93% - 100%)    GENERAL: NAD, well-groomed, well-developed  HEAD:  Atraumatic, Normocephalic  EYES: EOMI, PERRLA, conjunctiva and sclera clear  ENT: Moist mucous membranes,  NECK: Supple, No JVD, no bruits  CHEST/LUNG: Clear to percussion bilaterally; No rales, rhonchi, wheezing, or rubs  HEART: Regular rate and rhythm; No murmurs, rubs, or gallops PMI non displaced.  ABDOMEN: Soft, Nontender, Nondistended; Bowel sounds present  EXTREMITIES:  2+ Peripheral Pulses, No clubbing, cyanosis, or edema  SKIN: No rashes or lesions  NERVOUS SYSTEM:  Cranial Nerves II-XII intact      TELEMETRY:    rsr        ECG:    < from: 12 Lead ECG (10.05.18 @ 10:58) >    Ventricular Rate 66 BPM    Atrial Rate 66 BPM    P-R Interval 180 ms    QRS Duration 90 ms    Q-T Interval 452 ms    QTC Calculation(Bezet) 473 ms    P Axis 80 degrees    R Axis 57 degrees    T Axis 101 degrees    Diagnosis Line Normal sinus rhythm  LVH , nstwc  Borderline ECG  No previous ECGs available  Confirmed by PALMIRA PALM, GARY MEJIAS ([id number]) on 10/6/2018 8:50:38 AM    < end of copied text >    ECHO:    < from: TTE Echo Complete w/Doppler (10.07.18 @ 09:03) >  Summary:   1. Left ventricular ejection fraction, by visual estimation, is 55 to   60%.   2. Normal global left ventricular systolic function.   3. Increased LV wall thickness.   4. Spectral Doppler shows impaired relaxation pattern of left   ventricular myocardial filling (Grade I diastolic dysfunction).   5. There is mild concentric left ventricular hypertrophy.   6. Thickening of the anterior and posterior mitral valve leaflets.   7. Mild tricuspid regurgitation.   8. Mild aortic regurgitation.   9. Pulmonic valve regurgitation.  10. Structurally normal pulmonic valve, with normal leaflet excursion.  11. Estimated pulmonary artery systolic pressure is 43.8 mmHg assuming a   right atrial pressure of 10 mmHg, which is consistent with mild pulmonary   hypertension.  12. LA volume Index is 71.8 ml/m² ml/m2.  13. The aortic valve mean gradient is 6.7 mmHg consistent with normally   opening aortic valve.    [id number] Gary Rivers MD,[facility] , Electronically signed on 10/7/2018 at   12:52:29 PM       < end of copied text >      LABS:                        10.8   5.7   )-----------( 163      ( 11 Oct 2018 04:25 )             32.0     10-11    128<L>  |  98  |  28<H>  ----------------------------<  196<H>  5.1   |  22  |  1.41<H>    Ca    9.0      11 Oct 2018 10:05  Phos  4.0     10-11  Mg     1.7     10-11              I&O's Summary    10 Oct 2018 07:01  -  11 Oct 2018 07:00  --------------------------------------------------------  IN: 900 mL / OUT: 553 mL / NET: 347 mL    11 Oct 2018 07:01  -  11 Oct 2018 16:55  --------------------------------------------------------  IN: 730 mL / OUT: 0 mL / NET: 730 mL      BNP    RADIOLOGY & ADDITIONAL STUDIES:    ECHO: Follow up for rule out afib  SUBJ:    comfortable pca at bedside    PMH  Hypertension, unspecified type      MEDICATIONS  (STANDING):  aspirin  chewable 81 milliGRAM(s) Oral daily  atorvastatin 40 milliGRAM(s) Oral at bedtime  cloNIDine 0.1 milliGRAM(s) Oral every 8 hours  docusate sodium 100 milliGRAM(s) Oral three times a day  heparin  Injectable 5000 Unit(s) SubCutaneous every 12 hours  metoprolol tartrate 50 milliGRAM(s) Oral two times a day  pantoprazole    Tablet 40 milliGRAM(s) Oral daily  polyethylene glycol 3350 17 Gram(s) Oral two times a day  predniSONE   Tablet 10 milliGRAM(s) Oral every 12 hours  senna 2 Tablet(s) Oral at bedtime    MEDICATIONS  (PRN):  acetaminophen   Tablet .. 650 milliGRAM(s) Oral every 6 hours PRN Temp greater or equal to 38C (100.4F), Moderate Pain (4 - 6)        PHYSICAL EXAM:  Vital Signs Last 24 Hrs  T(C): 36.6 (11 Oct 2018 15:00), Max: 37 (10 Oct 2018 22:00)  T(F): 97.9 (11 Oct 2018 15:00), Max: 98.6 (10 Oct 2018 22:00)  HR: 53 (11 Oct 2018 16:00) (51 - 63)  BP: 131/40 (11 Oct 2018 16:00) (107/47 - 228/85)  BP(mean): 67 (11 Oct 2018 16:00) (65 - 133)  RR: 18 (11 Oct 2018 16:00) (15 - 25)  SpO2: 95% (11 Oct 2018 16:00) (93% - 100%)    GENERAL: NAD, lethargic elderly woman  HEAD:  Atraumatic, Normocephalic  EYES: EOMI, PERRLA, conjunctiva and sclera clear  ENT: Moist mucous membranes,  NECK: Supple, No JVD, no bruits  CHEST/LUNG: Clear to percussion bilaterally; No rales, rhonchi, wheezing, or rubs  HEART: Regular rate and rhythm; No murmurs, rubs, or gallops PMI non displaced.  ABDOMEN: Soft, Nontender, Nondistended; Bowel sounds present  EXTREMITIES:  2+ Peripheral Pulses, No clubbing, cyanosis, or edema  SKIN: No rashes or lesions  NERVOUS SYSTEM:  Cranial Nerves II-XII intact      TELEMETRY:    rsr        ECG:    < from: 12 Lead ECG (10.05.18 @ 10:58) >    Ventricular Rate 66 BPM    Atrial Rate 66 BPM    P-R Interval 180 ms    QRS Duration 90 ms    Q-T Interval 452 ms    QTC Calculation(Bezet) 473 ms    P Axis 80 degrees    R Axis 57 degrees    T Axis 101 degrees    Diagnosis Line Normal sinus rhythm  LVH , nstwc  Borderline ECG  No previous ECGs available  Confirmed by PALMIRA PALM, GARY MEJIAS ([id number]) on 10/6/2018 8:50:38 AM    < end of copied text >    ECHO:    < from: TTE Echo Complete w/Doppler (10.07.18 @ 09:03) >  Summary:   1. Left ventricular ejection fraction, by visual estimation, is 55 to   60%.   2. Normal global left ventricular systolic function.   3. Increased LV wall thickness.   4. Spectral Doppler shows impaired relaxation pattern of left   ventricular myocardial filling (Grade I diastolic dysfunction).   5. There is mild concentric left ventricular hypertrophy.   6. Thickening of the anterior and posterior mitral valve leaflets.   7. Mild tricuspid regurgitation.   8. Mild aortic regurgitation.   9. Pulmonic valve regurgitation.  10. Structurally normal pulmonic valve, with normal leaflet excursion.  11. Estimated pulmonary artery systolic pressure is 43.8 mmHg assuming a   right atrial pressure of 10 mmHg, which is consistent with mild pulmonary   hypertension.  12. LA volume Index is 71.8 ml/m² ml/m2.  13. The aortic valve mean gradient is 6.7 mmHg consistent with normally   opening aortic valve.    [id number] Gary Rivers MD,[facility] , Electronically signed on 10/7/2018 at   12:52:29 PM       < end of copied text >      LABS:                        10.8   5.7   )-----------( 163      ( 11 Oct 2018 04:25 )             32.0     10-11    128<L>  |  98  |  28<H>  ----------------------------<  196<H>  5.1   |  22  |  1.41<H>    Ca    9.0      11 Oct 2018 10:05  Phos  4.0     10-11  Mg     1.7     10-11              I&O's Summary    10 Oct 2018 07:01  -  11 Oct 2018 07:00  --------------------------------------------------------  IN: 900 mL / OUT: 553 mL / NET: 347 mL    11 Oct 2018 07:01  -  11 Oct 2018 16:55  --------------------------------------------------------  IN: 730 mL / OUT: 0 mL / NET: 730 mL      BNP    RADIOLOGY & ADDITIONAL STUDIES:    ECHO:

## 2018-10-11 NOTE — PROGRESS NOTE ADULT - SUBJECTIVE AND OBJECTIVE BOX
HPI: alert.c/o pain in her joints. eating well. no hypoglycemia    MEDICATIONS  (STANDING):  aspirin  chewable 81 milliGRAM(s) Oral daily  atorvastatin 40 milliGRAM(s) Oral at bedtime  cloNIDine 0.1 milliGRAM(s) Oral every 8 hours  docusate sodium 100 milliGRAM(s) Oral three times a day  heparin  Injectable 5000 Unit(s) SubCutaneous every 12 hours  metoprolol succinate ER 25 milliGRAM(s) Oral daily  pantoprazole    Tablet 40 milliGRAM(s) Oral daily  polyethylene glycol 3350 17 Gram(s) Oral two times a day  predniSONE   Tablet 10 milliGRAM(s) Oral every 12 hours  senna 2 Tablet(s) Oral at bedtime    MEDICATIONS  (PRN):  acetaminophen   Tablet .. 650 milliGRAM(s) Oral every 6 hours PRN Temp greater or equal to 38C (100.4F), Moderate Pain (4 - 6)      Allergies    ACE inhibitors (Other)  hydrALAZINE (Other)    Intolerances        PHYSICAL EXAM:  VITALS: T(C): 36.6 (10-11-18 @ 07:00)  T(F): 97.8 (10-11-18 @ 07:00), Max: 98.6 (10-10-18 @ 13:00)  HR: 53 (10-11-18 @ 10:00) (52 - 63)  BP: 158/49 (10-11-18 @ 10:00) (131/46 - 228/85)  RR:  (16 - 25)  SpO2:  (95% - 100%)  Wt(kg): --  GENERAL: NAD,   EYES, vision reduced  HEENT:  Atraumatic, Normocephalic,   THYROID: Normal size, no palpable nodules  RESPIRATORY: Clear to auscultation bilaterally  CARDIOVASCULAR: Regular rhythm; bradycardia. grade 2 systoilc murmur  GI: Soft, nontender, non distended, normal bowel sounds  SKIN: Dry, intact, No rashes or lesions  MUSCULOSKELETAL: L paresis  NEURO: L side weaknness  PSYCH: Alert and oriented x 1      POCT Blood Glucose.: 153 mg/dL (10-09-18 @ 22:08)  POCT Blood Glucose.: 113 mg/dL (10-09-18 @ 17:44)  POCT Blood Glucose.: 105 mg/dL (10-09-18 @ 12:50)  POCT Blood Glucose.: 106 mg/dL (10-09-18 @ 08:47)  POCT Blood Glucose.: 83 mg/dL (10-09-18 @ 05:43)  POCT Blood Glucose.: 240 mg/dL (10-09-18 @ 02:43)                            10.8   5.7   )-----------( 163      ( 11 Oct 2018 04:25 )             32.0       10-11    128<L>  |  98  |  28<H>  ----------------------------<  196<H>  5.1   |  22  |  1.41<H>    EGFR if : 37<L>  EGFR if non : 32<L>    Ca    9.0      10-11  Mg     1.7     10-11  Phos  4.0     10-11    TPro  6.4  /  Alb  2.5<L>  /  TBili  0.4  /  DBili  0.1  /  AST  46<H>  /  ALT  24  /  AlkPhos  48  10-09      Thyroid Function Tests:  10-10 @ 05:00 TSH 3.46 FreeT4 -- T3 -- Anti TPO -- Anti Thyroglobulin Ab -- TSI --  10-09 @ 16:47 TSH 1.91 FreeT4 -- T3 -- Anti TPO -- Anti Thyroglobulin Ab -- TSI --      Hemoglobin A1C, Whole Blood: 5.7 % <H> [4.0 - 5.6] (10-06-18 @ 05:30)      Hormone Tests:  Acth 9, cortisol 9.4,FSH 30, Lh 40, Tsh 3.4

## 2018-10-11 NOTE — PROGRESS NOTE ADULT - SUBJECTIVE AND OBJECTIVE BOX
Follow-up Critical Care Progress Note  Chief Complaint : Other symptom or sign involving musculoskeletal system    patient more alert today  now acknowledging left sided weakness this am  glucose been stable  however overnight Na noted to be low and received Tolvaptam      Allergies :ACE inhibitors (Other)  hydrALAZINE (Other)      PAST MEDICAL & SURGICAL HISTORY:  Hypertension, unspecified type  History of total hip replacement, right:  at [facility],  No complications  S/p bilateral carotid endarterectomy:  [facility], no complications  S/P AVR:  at [facility], Dr. Bermudez.  No complications      Medications:  MEDICATIONS  (STANDING):  aspirin  chewable 81 milliGRAM(s) Oral daily  atorvastatin 40 milliGRAM(s) Oral at bedtime  cloNIDine 0.1 milliGRAM(s) Oral every 8 hours  docusate sodium 100 milliGRAM(s) Oral two times a day  heparin  Injectable 5000 Unit(s) SubCutaneous every 12 hours  magnesium sulfate  IVPB 1 Gram(s) IV Intermittent every 1 hour  metoprolol succinate ER 25 milliGRAM(s) Oral daily  pantoprazole    Tablet 40 milliGRAM(s) Oral daily  predniSONE   Tablet 10 milliGRAM(s) Oral every 12 hours    MEDICATIONS  (PRN):  acetaminophen   Tablet .. 650 milliGRAM(s) Oral every 6 hours PRN Temp greater or equal to 38C (100.4F), Moderate Pain (4 - 6)      LABS:                        10.8   5.7   )-----------( 163      ( 11 Oct 2018 04:25 )             32.0     10-11    128<L>  |  98  |  30<H>  ----------------------------<  147<H>  5.5<H>   |  21<L>  |  1.37<H>    Ca    8.5      11 Oct 2018 04:25  Phos  4.0     10-11  Mg     1.7     10-    TPro  6.4  /  Alb  2.5<L>  /  TBili  0.4  /  DBili  0.1  /  AST  46<H>  /  ALT  24  /  AlkPhos  48  10-            PT/INR - ( 09 Oct 2018 16:47 )   PT: 11.0 sec;   INR: 0.99 ratio         PTT - ( 09 Oct 2018 16:47 )  PTT:25.6 sec  Urinalysis Basic - ( 10 Oct 2018 17:30 )    Color: Yellow / Appearance: Clear / S.015 / pH: x  Gluc: x / Ketone: Negative  / Bili: Negative / Urobili: Negative   Blood: x / Protein: Negative / Nitrite: Negative   Leuk Esterase: Negative / RBC: x / WBC x   Sq Epi: x / Non Sq Epi: x / Bacteria: x      10-11-18 @ 04:25   -  128<L>  10-10-18 @ 20:05   -  125<L>  10-10-18 @ 16:00   -  127<L>  10-10-18 @ 10:30   -  128<L>  10-10-18 @ 05:00   -  127<L>  10-09-18 @ 22:00   -  127<L>      VITALS:  T(C): 37 (10-10-18 @ 22:00), Max: 37 (10-10-18 @ 13:00)  T(F): 98.6 (10-10-18 @ 22:00), Max: 98.6 (10-10-18 @ 13:00)  HR: 60 (10-11-18 @ 06:00) (53 - 63)  BP: 191/124 (10-11-18 @ 06:00) (131/46 - 228/85)  BP(mean): 133 (10-11-18 @ 06:00) (70 - 133)  ABP: --  ABP(mean): --  RR: 19 (10-11-18 @ 06:00) (16 - 21)  SpO2: 96% (10-11-18 @ 06:00) (95% - 100%)  CVP(mm Hg): --  CVP(cm H2O): --    Ins and Outs     10-10-18 @ 07:01  -  10-11-18 @ 07:00  --------------------------------------------------------  IN: 900 mL / OUT: 553 mL / NET: 347 mL          Weight (kg): 57.8 (10-10-18 @ 10:43)

## 2018-10-11 NOTE — PROGRESS NOTE ADULT - SUBJECTIVE AND OBJECTIVE BOX
Patient is a 89y old  Female who presents with a chief complaint of Weakness (10 Oct 2018 18:55)    PAST MEDICAL & SURGICAL HISTORY:  Hypertension, unspecified type  History of total hip replacement, right:  at [facility],  No complications  S/p bilateral carotid endarterectomy:  st [facility], no complications  S/P AVR:  at [facility], Dr. Bermudez.  No complications    AMY STEPHENSON   89y    Female    BRIEF HOSPITAL COURSE:    89F  hx HTN AVR carotid endarterectomies admit with clinical R MCA with L HP.  Initial CT neg by radiologist.  Outside of the TPA and catheter based therapies window.  Her BP was > 220 systolic.          Review of Systems:  L side [facility]                      All other ROS are negative.    Allergies    ACE inhibitors (Other)  hydrALAZINE (Other)    Intolerances      Weight (kg): 57.8 (10-10-18 @ 10:43)    ICU Vital Signs Last 24 Hrs  T(C): 37 (10 Oct 2018 22:00), Max: 37 (10 Oct 2018 13:00)  T(F): 98.6 (10 Oct 2018 22:00), Max: 98.6 (10 Oct 2018 13:00)  HR: 63 (10 Oct 2018 22:00) (51 - 63)  BP: 158/59 (10 Oct 2018 22:00) (65/45 - 187/67)  BP(mean): 88 (10 Oct 2018 22:00) (53 - 108)  ABP: --  ABP(mean): --  RR: 17 (10 Oct 2018 22:00) (12 - 20)  SpO2: 95% (10 Oct 2018 22:00) (83% - 100%)    Physical Examination:    General: NAD    HEENT:  L field cut     PULM: bilateral bs     CVS: s1 s2 reg    ABD: soft     EXT:  L lrg wound dressed     SKIN: warm    Neuro: L HP arm > L alert hemineglect     ABG - ( 09 Oct 2018 01:30 )  pH, Arterial: 7.37  pH, Blood: x     /  pCO2: 34    /  pO2: 69    / HCO3: x     / Base Excess: x     /  SaO2: 93            LABS:                        11.1   7.5   )-----------( 159      ( 10 Oct 2018 05:00 )             32.6     10-10    125<L>  |  97  |  28<H>  ----------------------------<  157<H>  5.2   |  23  |  1.40<H>    Ca    8.4      10 Oct 2018 20:05  Phos  3.7     1010  Mg     2.0     10-10    TPro  6.4  /  Alb  2.5<L>  /  TBili  0.4  /  DBili  0.1  /  AST  46<H>  /  ALT  24  /  AlkPhos  48  10-09    PT/INR - ( 09 Oct 2018 16:47 )   PT: 11.0 sec;   INR: 0.99 ratio         PTT - ( 09 Oct 2018 16:47 )  PTT:25.6 sec  Urinalysis Basic - ( 10 Oct 2018 17:30 )    Color: Yellow / Appearance: Clear / S.015 / pH: x  Gluc: x / Ketone: Negative  / Bili: Negative / Urobili: Negative   Blood: x / Protein: Negative / Nitrite: Negative   Leuk Esterase: Negative / RBC: x / WBC x   Sq Epi: x / Non Sq Epi: x / Bacteria: x      CULTURES:      Medications:  MEDICATIONS  (STANDING):  aspirin  chewable 81 milliGRAM(s) Oral daily  atorvastatin 40 milliGRAM(s) Oral at bedtime  cloNIDine 0.1 milliGRAM(s) Oral every 8 hours  docusate sodium 100 milliGRAM(s) Oral two times a day  heparin  Injectable 5000 Unit(s) SubCutaneous every 12 hours  metoprolol succinate ER 25 milliGRAM(s) Oral daily  pantoprazole    Tablet 40 milliGRAM(s) Oral daily  predniSONE   Tablet 10 milliGRAM(s) Oral every 12 hours    MEDICATIONS  (PRN):  acetaminophen   Tablet .. 650 milliGRAM(s) Oral every 6 hours PRN Temp greater or equal to 38C (100.4F), Moderate Pain (4 - 6)        10-09 @ :01  -  10-10 @ 07:00  --------------------------------------------------------  IN: 1000 mL / OUT: 50 mL / NET: 950 mL    10-10 @ 07:01  -  10-11 @ 00:24  --------------------------------------------------------  IN: 900 mL / OUT: 550 mL / NET: 350 mL        RADIOLOGY/IMAGING/ECHO    < from: CT Head No Cont (10.06.18 @ 11:04) >  MPRESSION: Evolving acute/subacute infarcts within the high right   paramedian parietal and right occipital lobes. No hemorrhagic   transformation.      Assessment/Plan:    89F with pmhx of HTN, bilateral carotid endarterectomy in , AVR in  who presented on 10/5 with stroke syndrome outside of the TPA/catheter based therapy window,  CT with several areas of acute stroke (right parietal/occipital).    10/9 Developed AMS due to hyponatremia and hypoglycemia.       Persistent hyponatremia despite hypertonic saline.  Urine NA and OSM c/w  SIADH.   CSW is usually seen with intense CNS process.    D/W Dr Foreman.  She suggested a low dose of tolvaptan as vasopressin 2 antagonist for aquaresis.  15mg given at 10pm, getting 4 AM NA+ level .  No elizabeth and she has incontinence, so will be a challenge to determine UOP here.      1200 cc fluid restriction.

## 2018-10-11 NOTE — PROGRESS NOTE ADULT - ASSESSMENT
Physical Examination:  GENERAL:               Alert responsive, No acute distress.    HEENT:                  eyes reactive, do not cross midline to left Symmetric. No JVD, Moist MM  PULM:                     Bilateral air entry, Clear to auscultation bilaterally, no significant sputum production, No Rales, No Rhonchi, No Wheezing  CVS:                         S1, S2,  +Murmur  ABD:                        Soft, nondistended, nontender, normoactive bowel sounds,   EXT:                         No edema, nontender, No Cyanosis or Clubbing   Vascular:                Warm Extremities, Normal Capillary refill, Normal Distal Pulses  SKIN:                       Warm and well perfused, no rashes noted.   NEURO:                  Alert , interactive, Left HP with 2/5 LUE - LLE 4/5 Right side 5/5, left hemineglect   PSYC:                      Calm, No Insight.        Assessment  Metabolic encephelopathy ; Resolved  acute hyponatremia ? SIADH vs Cerebral Salt Waisting   acute hypoglycemia unsure of etiology at this time despite being fed by DTR.   Acute CVA with Left HP , not a candidate for tpa on presentation r/o cardio embolic as multiple locations on ct.  labile BP, in pt with underlying HTN  VHD s/p AVR  no known history of DM2 -Hemoglobin A1C, Whole Blood: 5.7 %        Plan   Renal f/u   bmp q 6 hrs  endo f/u  continue another 2 days of prednisone  sBP control keep 140-180  asa, statin  1:1 observation to continue  supportive care     Family Updated: 	DTR	     bedside                            Date: 10/10    Sedation & Analgesia:	on hold  Diet/Nutrition:		as tolerated, advance as per mental status  GI PPx:			PPI    DVT Ppx:		Heparin    Disposition: ICU monitoring with hypertonic saline    Goals of Care: Full Code.

## 2018-10-11 NOTE — PROGRESS NOTE ADULT - ASSESSMENT
no new hypoglycemia. hyponatremia- continue fluid restriction.  Pit. functions are good. no adrenal problem now.  suggest: stop prednisone.  continue supportive care.

## 2018-10-11 NOTE — CHART NOTE - NSCHARTNOTEFT_GEN_A_CORE
NUTRITION FOLLOW UP    SOURCE: RN/ Medical Record    DIET: Regular with 1200cc fluid restriction    Patient is currently receiving a Regular diet with 1200cc fluid restriction due to patient hyponatremic, however not slowly improving. Patient consuming between % of meals as per flow sheet. No GI distress noted. Patient noted with c/o constipation, RN aware.        CURRENT WEIGHT: 127.4/57.8kg, stable weight since admission    PERTINENT MEDS:   Pertinent Medications: MEDICATIONS  (STANDING):  aspirin  chewable 81 milliGRAM(s) Oral daily  atorvastatin 40 milliGRAM(s) Oral at bedtime  cloNIDine 0.1 milliGRAM(s) Oral every 8 hours  docusate sodium 100 milliGRAM(s) Oral three times a day  heparin  Injectable 5000 Unit(s) SubCutaneous every 12 hours  metoprolol tartrate 50 milliGRAM(s) Oral two times a day  pantoprazole    Tablet 40 milliGRAM(s) Oral daily  polyethylene glycol 3350 17 Gram(s) Oral two times a day  predniSONE   Tablet 10 milliGRAM(s) Oral every 12 hours  senna 2 Tablet(s) Oral at bedtime    MEDICATIONS  (PRN):  acetaminophen   Tablet .. 650 milliGRAM(s) Oral every 6 hours PRN Temp greater or equal to 38C (100.4F), Moderate Pain (4 - 6)      PERTINENT LABS:  Date: 11 Oct 2018 04:25  Hemoglobin 10.8 (L)  Hematocrit 32.0(L)     Date: 10-11  Sodium 128<L>  Potassium 5.1  Glucose Serum 196<H>  BUN 28<H>  Creatinine 1.41(H)    ACCUCHECK  POCT Blood Glucose.: 153 mg/dL (09 Oct 2018 22:08)  POCT Blood Glucose.: 113 mg/dL (09 Oct 2018 17:44)      SKIN: intact , shin skin injury noted.    ESTIMATED NEEDS:   [X] no change since previous assessment  [ ] recalculated:     PREVIOUS NUTRITION DIAGNOSIS: addressed    NUTRITION DIAGNOSIS is   [X] resolved, RD will follow as per nutrition department protocol.        MONITORING AND EVALUATION:   Current diet order is appropriate and is well tolerated, but will monitor for any changes that may be needed    [X] PO intake [X] Tolerance to diet prescription [X] weights [X] follow up per protocol    NUTRITION RECOMMENDATIONS: Encourage po intake and monitor po fluids due to hyponatremia.

## 2018-10-11 NOTE — PROGRESS NOTE ADULT - SUBJECTIVE AND OBJECTIVE BOX
Seen in ICU, awake, appetite fair    Vital Signs Last 24 Hrs  T(C): 36.6 (10-11-18 @ 15:00), Max: 37 (10-10-18 @ 22:00)  T(F): 97.9 (10-11-18 @ 15:00), Max: 98.6 (10-10-18 @ 22:00)  HR: 53 (10-11-18 @ 16:00) (51 - 63)  BP: 131/40 (10-11-18 @ 16:00) (107/47 - 228/85)  BP(mean): 67 (10-11-18 @ 16:00) (65 - 133)  RR: 18 (10-11-18 @ 16:00) (15 - 25)  SpO2: 95% (10-11-18 @ 16:00) (93% - 100%)    Gen no distress  Lungs good air entry b/l  Abd soft, ND  Extr no edema  Skin no rash                                 10.8   5.7   )-----------( 163      ( 11 Oct 2018 04:25 )             32.0     11 Oct 2018 16:45    129    |  100    |  33     ----------------------------<  144    5.1     |  23     |  1.45     Ca    8.5        11 Oct 2018 16:45  Phos  4.0       11 Oct 2018 04:25  Mg     1.7       11 Oct 2018 04:25    Urinalysis Basic - ( 10 Oct 2018 17:30 )    Color: Yellow / Appearance: Clear / S.015 / pH: x  Gluc: x / Ketone: Negative  / Bili: Negative / Urobili: Negative   Blood: x / Protein: Negative / Nitrite: Negative   Leuk Esterase: Negative / RBC: x / WBC x   Sq Epi: x / Non Sq Epi: x / Bacteria: x    acetaminophen   Tablet .. 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

## 2018-10-12 LAB
ALBUMIN SERPL ELPH-MCNC: 2.4 G/DL — LOW (ref 3.3–5)
ALBUMIN SERPL ELPH-MCNC: 2.5 G/DL — LOW (ref 3.3–5)
ALP SERPL-CCNC: 43 U/L — SIGNIFICANT CHANGE UP (ref 40–120)
ALP SERPL-CCNC: 48 U/L — SIGNIFICANT CHANGE UP (ref 40–120)
ALT FLD-CCNC: 25 U/L DA — SIGNIFICANT CHANGE UP (ref 10–45)
ALT FLD-CCNC: 25 U/L DA — SIGNIFICANT CHANGE UP (ref 10–45)
ANION GAP SERPL CALC-SCNC: 5 MMOL/L — SIGNIFICANT CHANGE UP (ref 5–17)
ANION GAP SERPL CALC-SCNC: 6 MMOL/L — SIGNIFICANT CHANGE UP (ref 5–17)
AST SERPL-CCNC: 29 U/L — SIGNIFICANT CHANGE UP (ref 10–40)
AST SERPL-CCNC: 36 U/L — SIGNIFICANT CHANGE UP (ref 10–40)
BASOPHILS # BLD AUTO: 0.1 K/UL — SIGNIFICANT CHANGE UP (ref 0–0.2)
BASOPHILS NFR BLD AUTO: 1 % — SIGNIFICANT CHANGE UP (ref 0–2)
BILIRUB DIRECT SERPL-MCNC: 0 MG/DL — SIGNIFICANT CHANGE UP (ref 0–0.2)
BILIRUB INDIRECT FLD-MCNC: 0.3 MG/DL — SIGNIFICANT CHANGE UP (ref 0.2–1)
BILIRUB SERPL-MCNC: 0.2 MG/DL — SIGNIFICANT CHANGE UP (ref 0.2–1.2)
BILIRUB SERPL-MCNC: 0.3 MG/DL — SIGNIFICANT CHANGE UP (ref 0.2–1.2)
BUN SERPL-MCNC: 34 MG/DL — HIGH (ref 7–23)
BUN SERPL-MCNC: 35 MG/DL — HIGH (ref 7–23)
BUN SERPL-MCNC: 36 MG/DL — HIGH (ref 7–23)
CALCIUM SERPL-MCNC: 8.4 MG/DL — SIGNIFICANT CHANGE UP (ref 8.4–10.5)
CALCIUM SERPL-MCNC: 8.6 MG/DL — SIGNIFICANT CHANGE UP (ref 8.4–10.5)
CALCIUM SERPL-MCNC: 8.8 MG/DL — SIGNIFICANT CHANGE UP (ref 8.4–10.5)
CALCIUM SERPL-MCNC: 9 MG/DL — SIGNIFICANT CHANGE UP (ref 8.4–10.5)
CALCIUM SERPL-MCNC: 9.1 MG/DL — SIGNIFICANT CHANGE UP (ref 8.4–10.5)
CHLORIDE SERPL-SCNC: 100 MMOL/L — SIGNIFICANT CHANGE UP (ref 96–108)
CHLORIDE SERPL-SCNC: 100 MMOL/L — SIGNIFICANT CHANGE UP (ref 96–108)
CHLORIDE SERPL-SCNC: 101 MMOL/L — SIGNIFICANT CHANGE UP (ref 96–108)
CHLORIDE SERPL-SCNC: 102 MMOL/L — SIGNIFICANT CHANGE UP (ref 96–108)
CHLORIDE SERPL-SCNC: 103 MMOL/L — SIGNIFICANT CHANGE UP (ref 96–108)
CO2 SERPL-SCNC: 20 MMOL/L — LOW (ref 22–31)
CO2 SERPL-SCNC: 22 MMOL/L — SIGNIFICANT CHANGE UP (ref 22–31)
CO2 SERPL-SCNC: 24 MMOL/L — SIGNIFICANT CHANGE UP (ref 22–31)
CREAT SERPL-MCNC: 1.35 MG/DL — HIGH (ref 0.5–1.3)
CREAT SERPL-MCNC: 1.35 MG/DL — HIGH (ref 0.5–1.3)
CREAT SERPL-MCNC: 1.44 MG/DL — HIGH (ref 0.5–1.3)
CREAT SERPL-MCNC: 1.45 MG/DL — HIGH (ref 0.5–1.3)
CREAT SERPL-MCNC: 1.47 MG/DL — HIGH (ref 0.5–1.3)
EOSINOPHIL # BLD AUTO: 0.1 K/UL — SIGNIFICANT CHANGE UP (ref 0–0.5)
EOSINOPHIL NFR BLD AUTO: 1.1 % — SIGNIFICANT CHANGE UP (ref 0–6)
GLUCOSE SERPL-MCNC: 111 MG/DL — HIGH (ref 70–99)
GLUCOSE SERPL-MCNC: 112 MG/DL — HIGH (ref 70–99)
GLUCOSE SERPL-MCNC: 121 MG/DL — HIGH (ref 70–99)
GLUCOSE SERPL-MCNC: 152 MG/DL — HIGH (ref 70–99)
GLUCOSE SERPL-MCNC: 158 MG/DL — HIGH (ref 70–99)
HCT VFR BLD CALC: 31.2 % — LOW (ref 34.5–45)
HGB BLD-MCNC: 10.5 G/DL — LOW (ref 11.5–15.5)
LYMPHOCYTES # BLD AUTO: 1.3 K/UL — SIGNIFICANT CHANGE UP (ref 1–3.3)
LYMPHOCYTES # BLD AUTO: 19.3 % — SIGNIFICANT CHANGE UP (ref 13–44)
MAGNESIUM SERPL-MCNC: 2.3 MG/DL — SIGNIFICANT CHANGE UP (ref 1.6–2.6)
MCHC RBC-ENTMCNC: 33.8 GM/DL — SIGNIFICANT CHANGE UP (ref 32–36)
MCHC RBC-ENTMCNC: 33.9 PG — SIGNIFICANT CHANGE UP (ref 27–34)
MCV RBC AUTO: 100.4 FL — HIGH (ref 80–100)
MONOCYTES # BLD AUTO: 0.5 K/UL — SIGNIFICANT CHANGE UP (ref 0–0.9)
MONOCYTES NFR BLD AUTO: 7.2 % — SIGNIFICANT CHANGE UP (ref 2–14)
NEUTROPHILS # BLD AUTO: 4.8 K/UL — SIGNIFICANT CHANGE UP (ref 1.8–7.4)
NEUTROPHILS NFR BLD AUTO: 71.3 % — SIGNIFICANT CHANGE UP (ref 43–77)
PHOSPHATE SERPL-MCNC: 4.3 MG/DL — SIGNIFICANT CHANGE UP (ref 2.5–4.5)
PLATELET # BLD AUTO: 191 K/UL — SIGNIFICANT CHANGE UP (ref 150–400)
POTASSIUM SERPL-MCNC: 5.2 MMOL/L — SIGNIFICANT CHANGE UP (ref 3.5–5.3)
POTASSIUM SERPL-MCNC: 5.3 MMOL/L — SIGNIFICANT CHANGE UP (ref 3.5–5.3)
POTASSIUM SERPL-MCNC: 5.3 MMOL/L — SIGNIFICANT CHANGE UP (ref 3.5–5.3)
POTASSIUM SERPL-MCNC: 5.4 MMOL/L — HIGH (ref 3.5–5.3)
POTASSIUM SERPL-MCNC: 5.8 MMOL/L — HIGH (ref 3.5–5.3)
POTASSIUM SERPL-SCNC: 5.2 MMOL/L — SIGNIFICANT CHANGE UP (ref 3.5–5.3)
POTASSIUM SERPL-SCNC: 5.3 MMOL/L — SIGNIFICANT CHANGE UP (ref 3.5–5.3)
POTASSIUM SERPL-SCNC: 5.3 MMOL/L — SIGNIFICANT CHANGE UP (ref 3.5–5.3)
POTASSIUM SERPL-SCNC: 5.4 MMOL/L — HIGH (ref 3.5–5.3)
POTASSIUM SERPL-SCNC: 5.8 MMOL/L — HIGH (ref 3.5–5.3)
PROT SERPL-MCNC: 6.6 G/DL — SIGNIFICANT CHANGE UP (ref 6–8.3)
PROT SERPL-MCNC: 6.8 G/DL — SIGNIFICANT CHANGE UP (ref 6–8.3)
RBC # BLD: 3.1 M/UL — LOW (ref 3.8–5.2)
RBC # FLD: 12.5 % — SIGNIFICANT CHANGE UP (ref 10.3–14.5)
SODIUM SERPL-SCNC: 125 MMOL/L — LOW (ref 135–145)
SODIUM SERPL-SCNC: 130 MMOL/L — LOW (ref 135–145)
SODIUM SERPL-SCNC: 131 MMOL/L — LOW (ref 135–145)
SODIUM SERPL-SCNC: 131 MMOL/L — LOW (ref 135–145)
SODIUM SERPL-SCNC: 132 MMOL/L — LOW (ref 135–145)
WBC # BLD: 6.7 K/UL — SIGNIFICANT CHANGE UP (ref 3.8–10.5)
WBC # FLD AUTO: 6.7 K/UL — SIGNIFICANT CHANGE UP (ref 3.8–10.5)

## 2018-10-12 PROCEDURE — 99231 SBSQ HOSP IP/OBS SF/LOW 25: CPT

## 2018-10-12 RX ORDER — SODIUM CHLORIDE 9 MG/ML
1 INJECTION INTRAMUSCULAR; INTRAVENOUS; SUBCUTANEOUS ONCE
Qty: 0 | Refills: 0 | Status: COMPLETED | OUTPATIENT
Start: 2018-10-12 | End: 2018-10-12

## 2018-10-12 RX ORDER — LOSARTAN POTASSIUM 100 MG/1
50 TABLET, FILM COATED ORAL DAILY
Qty: 0 | Refills: 0 | Status: DISCONTINUED | OUTPATIENT
Start: 2018-10-12 | End: 2018-10-12

## 2018-10-12 RX ORDER — LABETALOL HCL 100 MG
10 TABLET ORAL ONCE
Qty: 0 | Refills: 0 | Status: COMPLETED | OUTPATIENT
Start: 2018-10-12 | End: 2018-10-12

## 2018-10-12 RX ORDER — AMLODIPINE BESYLATE 2.5 MG/1
5 TABLET ORAL DAILY
Qty: 0 | Refills: 0 | Status: DISCONTINUED | OUTPATIENT
Start: 2018-10-12 | End: 2018-10-13

## 2018-10-12 RX ORDER — LABETALOL HCL 100 MG
100 TABLET ORAL EVERY 8 HOURS
Qty: 0 | Refills: 0 | Status: DISCONTINUED | OUTPATIENT
Start: 2018-10-12 | End: 2018-10-13

## 2018-10-12 RX ADMIN — Medication 0.1 MILLIGRAM(S): at 06:36

## 2018-10-12 RX ADMIN — ATORVASTATIN CALCIUM 40 MILLIGRAM(S): 80 TABLET, FILM COATED ORAL at 21:20

## 2018-10-12 RX ADMIN — Medication 0.1 MILLIGRAM(S): at 21:21

## 2018-10-12 RX ADMIN — Medication 10 MILLIGRAM(S): at 17:03

## 2018-10-12 RX ADMIN — HEPARIN SODIUM 5000 UNIT(S): 5000 INJECTION INTRAVENOUS; SUBCUTANEOUS at 05:25

## 2018-10-12 RX ADMIN — HEPARIN SODIUM 5000 UNIT(S): 5000 INJECTION INTRAVENOUS; SUBCUTANEOUS at 17:03

## 2018-10-12 RX ADMIN — Medication 100 MILLIGRAM(S): at 14:16

## 2018-10-12 RX ADMIN — Medication 100 MILLIGRAM(S): at 21:21

## 2018-10-12 RX ADMIN — Medication 10 MILLIGRAM(S): at 05:25

## 2018-10-12 RX ADMIN — Medication 100 MILLIGRAM(S): at 05:25

## 2018-10-12 RX ADMIN — Medication 10 MILLIGRAM(S): at 08:08

## 2018-10-12 RX ADMIN — Medication 0.1 MILLIGRAM(S): at 14:16

## 2018-10-12 RX ADMIN — AMLODIPINE BESYLATE 5 MILLIGRAM(S): 2.5 TABLET ORAL at 08:23

## 2018-10-12 RX ADMIN — Medication 0.1 MILLIGRAM(S): at 05:25

## 2018-10-12 RX ADMIN — Medication 81 MILLIGRAM(S): at 11:14

## 2018-10-12 RX ADMIN — PANTOPRAZOLE SODIUM 40 MILLIGRAM(S): 20 TABLET, DELAYED RELEASE ORAL at 11:14

## 2018-10-12 RX ADMIN — POLYETHYLENE GLYCOL 3350 17 GRAM(S): 17 POWDER, FOR SOLUTION ORAL at 05:25

## 2018-10-12 RX ADMIN — SODIUM CHLORIDE 1 GRAM(S): 9 INJECTION INTRAMUSCULAR; INTRAVENOUS; SUBCUTANEOUS at 02:40

## 2018-10-12 NOTE — PROGRESS NOTE ADULT - ASSESSMENT
s/p CVA, history of AVR, Improved hyponatremia.   No AF identified on monitoring.  Consider outpatient monitoring post discharge ( loop or event recorder)

## 2018-10-12 NOTE — PROGRESS NOTE ADULT - SUBJECTIVE AND OBJECTIVE BOX
Seen in ICU, awake, appetite fair    Vital Signs Last 24 Hrs  T(C): 36.6 (10-12-18 @ 15:00), Max: 36.6 (10-11-18 @ 21:00)  T(F): 97.8 (10-12-18 @ 15:00), Max: 97.9 (10-11-18 @ 21:00)  HR: 59 (10-12-18 @ 16:00) (51 - 60)  BP: 136/45 (10-12-18 @ 16:00) (121/47 - 217/55)  BP(mean): 68 (10-12-18 @ 16:00) (61 - 127)  RR: 15 (10-12-18 @ 16:00) (13 - 20)  SpO2: 98% (10-12-18 @ 16:00) (91% - 100%)    Gen no distress  Lungs good air entry b/l  Abd soft, ND  Extr no edema  Skin no rash                                          10.5   6.7   )-----------( 191      ( 12 Oct 2018 05:25 )             31.2     12 Oct 2018 11:50    131    |  103    |  34     ----------------------------<  158    5.3     |  22     |  1.35     Ca    8.8        12 Oct 2018 11:50  Phos  4.3       12 Oct 2018 05:25  Mg     2.3       12 Oct 2018 05:25    TPro  6.6    /  Alb  2.5    /  TBili  0.2    /  DBili  x      /  AST  29     /  ALT  25     /  AlkPhos  43     12 Oct 2018 05:25    LIVER FUNCTIONS - ( 12 Oct 2018 05:25 )  Alb: 2.5 g/dL / Pro: 6.6 g/dL / ALK PHOS: 43 U/L / ALT: 25 U/L DA / AST: 29 U/L / GGT: x           Urinalysis Basic - ( 10 Oct 2018 17:30 )    Color: Yellow / Appearance: Clear / S.015 / pH: x  Gluc: x / Ketone: Negative  / Bili: Negative / Urobili: Negative   Blood: x / Protein: Negative / Nitrite: Negative   Leuk Esterase: Negative / RBC: x / WBC x   Sq Epi: x / Non Sq Epi: x / Bacteria: x    acetaminophen   Tablet .. 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

## 2018-10-12 NOTE — PROGRESS NOTE ADULT - ASSESSMENT
Physical Examination:  GENERAL:               Alert responsive, No acute distress.    HEENT:                  eyes reactive, do not cross midline to left Symmetric. No JVD, Moist MM  PULM:                     Bilateral air entry, Clear to auscultation bilaterally, no significant sputum production, No Rales, No Rhonchi, No Wheezing  CVS:                         S1, S2,  +Murmur  ABD:                        Soft, nondistended, nontender, normoactive bowel sounds,   EXT:                         No edema, nontender, No Cyanosis or Clubbing   Vascular:                Warm Extremities, Normal Capillary refill, Normal Distal Pulses  SKIN:                       Warm and well perfused, no rashes noted.   NEURO:                  Alert , interactive, Left HP with 2/5 LUE - LLE 4/5 Right side 5/5, left hemineglect   PSYC:                      Calm, No Insight.        Assessment  Metabolic encephelopathy ; Resolved  acute hyponatremia ? SIADH vs Cerebral Salt Waisting improving  acute hypoglycemia unsure of etiology   Acute CVA with Left HP , not a candidate for tpa on presentation r/o cardio embolic as multiple locations on ct.  labile BP, in pt with underlying HTN  VHD s/p AVR  no known history of DM2 -Hemoglobin A1C, Whole Blood: 5.7 %        Plan   D/W Renal - pt na would not correct so fast with salt tabs, will recommend to just monitor na q 12, Tolvaptan PRN if slow.   D/W Endo - d/c steroids today, isolated hypoglycemia unsure of cause         Both renal/Endo recommended to change Metoprolol to labetalol  sBP control keep 140-180  bp meds changed with norvasc, cozar, labatolol, clonodine  Regular diet  asa, statin    1:1 observation to continue  supportive care     Family Updated: 	 Dtr over phone Date: 10/12    Sedation & Analgesia:	on hold  Diet/Nutrition:		as tolerated, advance as per mental status  GI PPx:			PPI    DVT Ppx:		Heparin    Disposition: ICU monitoring     Goals of Care: Full Code.

## 2018-10-12 NOTE — CONSULT NOTE ADULT - ASSESSMENT
89 yo woman with h/o HTN and arthritis with acute right hemispheric MCA infarct with left hemiparesis, gait and ADL dysfunction, visual field deficits.     Recommend: Continue management per neurology, nephrology, cardiology, and primary team. Continue to monitortelemetry.  Patient will need continued monitoring on telemetry and monitoring of Na over the weekend and is not medically ready for discharge. She also needs further monitoring and management of BP.   Pt will need rehab services including PT, OT, and ST.    Will follow up with patient on Monday to assess progress and readiness for discharge to an acute [facility] vs [facility] facility.

## 2018-10-12 NOTE — PROGRESS NOTE ADULT - ASSESSMENT
89 y/o female with pmhx of HTN, bilateral carotid endarterectomy in 2008, AVR in 2008 now with large acute CVA of right parietal and occipital lobes with hospital course complicated by refractory hyponatremia secondary to SIADH.

## 2018-10-12 NOTE — PROGRESS NOTE ADULT - SUBJECTIVE AND OBJECTIVE BOX
Follow-up Critical Care Progress Note  Chief Complaint : Other symptom or sign involving musculoskeletal system      noted overnight events   hypertensive received extra dose of clonidine and this am labatolol  also noted na to go to 125 and received slat tabs    this am pt doing well, alert, responsive, no cp, sob, palp, n/v      Allergies :ACE inhibitors (Other)  hydrALAZINE (Other)      PAST MEDICAL & SURGICAL HISTORY:  Hypertension, unspecified type  History of total hip replacement, right:  at [facility],  No complications  S/p bilateral carotid endarterectomy:  [facility], no complications  S/P AVR:  at [facility], Dr. Bermudez.  No complications      Medications:  MEDICATIONS  (STANDING):  amLODIPine   Tablet 5 milliGRAM(s) Oral daily  aspirin  chewable 81 milliGRAM(s) Oral daily  atorvastatin 40 milliGRAM(s) Oral at bedtime  cloNIDine 0.1 milliGRAM(s) Oral every 8 hours  docusate sodium 100 milliGRAM(s) Oral three times a day  heparin  Injectable 5000 Unit(s) SubCutaneous every 12 hours  pantoprazole    Tablet 40 milliGRAM(s) Oral daily  polyethylene glycol 3350 17 Gram(s) Oral two times a day  predniSONE   Tablet 10 milliGRAM(s) Oral every 12 hours  senna 2 Tablet(s) Oral at bedtime    MEDICATIONS  (PRN):  acetaminophen   Tablet .. 650 milliGRAM(s) Oral every 6 hours PRN Temp greater or equal to 38C (100.4F), Moderate Pain (4 - 6)      LABS:                        10.5   6.7   )-----------( 191      ( 12 Oct 2018 05:25 )             31.2     10-12    131<L>  |  101  |  35<H>  ----------------------------<  112<H>  5.3   |  24  |  1.45<H>    Ca    9.1      12 Oct 2018 05:25  Phos  4.3     10-12  Mg     2.3     10-12    TPro  6.6  /  Alb  2.5<L>  /  TBili  0.2  /  DBili  x   /  AST  29  /  ALT  25  /  AlkPhos  43  10-12    Sodium trend  10-12-18 @ 05:25   -  131<L>  10-12-18 @ 01:05   -  125<L>  10-11-18 @ 16:45   -  129<L>  10-11-18 @ 10:05   -  128<L>  10-11-18 @ 04:25   -  128<L>  10-10-18 @ 20:05   -  125<L>          Urinalysis Basic - ( 10 Oct 2018 17:30 )    Color: Yellow / Appearance: Clear / S.015 / pH: x  Gluc: x / Ketone: Negative  / Bili: Negative / Urobili: Negative   Blood: x / Protein: Negative / Nitrite: Negative   Leuk Esterase: Negative / RBC: x / WBC x   Sq Epi: x / Non Sq Epi: x / Bacteria: x      VITALS:  T(C): 36.4 (10-12-18 @ 07:00), Max: 36.6 (10-11-18 @ 15:00)  T(F): 97.5 (10-12-18 @ 07:00), Max: 97.9 (10-11-18 @ 15:00)  HR: 55 (10-12-18 @ 09:00) (51 - 60)  BP: 152/39 (10-12-18 @ 09:00) (107/47 - 217/55)  BP(mean): 73 (10-12-18 @ 09:00) (65 - 127)  ABP: --  ABP(mean): --  RR: 15 (10-12-18 @ 09:00) (13 - 23)  SpO2: 96% (10-12-18 @ 09:00) (91% - 100%)  CVP(mm Hg): --  CVP(cm H2O): --    Ins and Outs     10-11-18 @ 07:01  -  10-12-18 @ 07:00  --------------------------------------------------------  IN: 1650 mL / OUT: 1050 mL / NET: 600 mL    10-12-18 @ 07:01  -  10-12-18 @ 09:43  --------------------------------------------------------  IN: 480 mL / OUT: 0 mL / NET: 480 mL          Weight (kg): 57.1 (10-12-18 @ 06:00)

## 2018-10-12 NOTE — CONSULT NOTE ADULT - SUBJECTIVE AND OBJECTIVE BOX
Patient is a 89y old  female who presented to [facility] on 10/5 with a chief complaint of left sided weakness since that preceding morning.  H/o hypertension, bilateral carotid endarterectomy and AVR replacement years ago.  From previous discussion with daughter, patient's BPs have been hard to control over the years and had been on BP medication at home but often her systolic BPs are in the 180-220s. In the ED, her SBP was 220. Out of window for tPA. Initial CT of head did not reveal any abnormalities however on repeat CT on the 6th, evolving acute/subacute infarcts within the high right paramedian parietal and right occipital lobes were noted. No hemorrhagic transformation seen. Neurology and cardiology were consulted. TTE and carotid dopplers without significant findings. No arrythmia found on telemetry monitoring. SBPs have continued to trend around 180 systolically. Catapress patch was applied on 10/6.  PT was unable to see patient due to SBP in the 180s. OT saw patient and found that patient was mod to max assist for bed mobility and transfers.   The evening of the 8th, patient was transferred to ICU for hyponatremia, mental status change, and hypoglycemia with a blood sugar in the 30s. Patient is being followed by cardiology. SBPs are still labile. Has not had any episodes of arrythmia noted on telemetry monitoring Considering monitoring as an outpatient.   Neurology continues to follow along. Monitoring metabolic encephalopathy 2/2 hyponatremia and residual deficits from CVA. Nephrology managing hyponatremia.  With physical therapy, patient is mod A x 2 for bed mobility and transfers today on 10/12. Speech therapy cleared patient for soft solids with thin liquids.  PMR re-consulted for weigh in on disposition planning.     PAST MEDICAL & SURGICAL HISTORY:  Hypertension, unspecified type  History of total hip replacement, right: 2014 at [facility],  No complications  S/p bilateral carotid endarterectomy: 2008 st [facility], no complications  S/P AVR: 2008 at [facility], Dr. Bermudez.  No complications  Right shoulder arthritis  Left radial nerve palsy after fall in 2017  Left carpel tunnel   macular degeneration       Allergies    ACE inhibitors (Other)  hydrALAZINE (Other)    MEDICATIONS  (STANDING):  amLODIPine   Tablet 5 milliGRAM(s) Oral daily  aspirin  chewable 81 milliGRAM(s) Oral daily  atorvastatin 40 milliGRAM(s) Oral at bedtime  cloNIDine 0.1 milliGRAM(s) Oral every 8 hours  docusate sodium 100 milliGRAM(s) Oral three times a day  heparin  Injectable 5000 Unit(s) SubCutaneous every 12 hours  labetalol 100 milliGRAM(s) Oral every 8 hours  pantoprazole    Tablet 40 milliGRAM(s) Oral daily  polyethylene glycol 3350 17 Gram(s) Oral two times a day  predniSONE   Tablet 10 milliGRAM(s) Oral every 12 hours  senna 2 Tablet(s) Oral at bedtime    MEDICATIONS  (PRN):  acetaminophen   Tablet .. 650 milliGRAM(s) Oral every 6 hours PRN Temp greater or equal to 38C (100.4F), Moderate Pain (4 - 6)      Social Hx: Lives in  with disabled spouse. Spouse has SCI and in mostly WC bound. Pt will bring spouse clothes and meals, but he is able to bath and dress himself. Patient was independent for all of her ADLs prior to stroke. Pt used a cane for balance. No longer drives due to macular degeneration.     TODAY'S SUBJECTIVE & REVIEW OF SYMPTOMS:  [ x  ] Constitutional WNL  [   ] Cardio WNL  [ x  ] Resp WNL  [ x  ] GI WNL  [ x  ] Heme WNL  [ x  ] Endo WNL  [ x  ] Skin WNL  [   ] MSK WNL  [   ] Neuro WNL  [ x  ] Cognitive WNL  [ x  ] Psych WNL  Patient denies CP, SOB, HA, dizziness, N/V/D, abdominal pain, fever, chills. States that the pain in her right shoulder is severe with movement and she is experiencing pain in the right hand from blood draws.   Pt reports that she is has pain to the posterior neck when her SBP are above 180-190. States that she doesn't notice a difference in alertness when SBP in the 120-150s.       Vital Signs Last 24 Hrs  T(C): 36.6 (12 Oct 2018 11:00), Max: 36.6 (11 Oct 2018 21:00)  T(F): 97.9 (12 Oct 2018 11:00), Max: 97.9 (11 Oct 2018 21:00)  HR: 58 (12 Oct 2018 14:00) (51 - 60)  BP: 150/43 (12 Oct 2018 14:00) (121/47 - 217/55)  BP(mean): 73 (12 Oct 2018 14:00) (61 - 127)  RR: 19 (12 Oct 2018 14:00) (13 - 20)  SpO2: 100% (12 Oct 2018 14:00) (91% - 100%)                          10.5   6.7   )-----------( 191      ( 12 Oct 2018 05:25 )             31.2   10-12    131<L>  |  103  |  34<H>  ----------------------------<  158<H>  5.3   |  22  |  1.35<H>    Ca    8.8      12 Oct 2018 11:50  Phos  4.3     10-12  Mg     2.3     10-12    TPro  6.6  /  Alb  2.5<L>  /  TBili  0.2  /  DBili  x   /  AST  29  /  ALT  25  /  AlkPhos  43  10-12      < from: CT Head No Cont (10.06.18 @ 11:04) >  EXAM:  CT BRAIN      PROCEDURE DATE:  10/06/2018        INTERPRETATION:  .    CLINICAL INFORMATION: Cerebrovascular accident.    TECHNIQUE: Multiple axial CT images of the head were obtained without   contrast. Sagittal and coronal reconstructed images were acquired from   the source data.    COMPARISON: Prior head CT examination from 10/5/2018.    FINDINGS: An evolving transcortical infarct is notable within the high   right paramedian parietal lobe. Evolving infarct is also notable within   the right occipital lobe.    There is a chronic infarct within the right posterior frontal lobe. A   chronic infarct is also noted within the left cerebellar hemisphere.   There is also an area of encephalomalacia and gliosis within the right   basal frontal lobe related to prior insult.    There is diffuse cerebral volume loss with prominence of the sulci,   fissures, and cisternal spaces which is normal for the patient's age.   There is mild periventricular white matter hypoattenuation statistically   compatible with microvascular changes given calcific atherosclerotic   disease of the intracranial arteries.    There is no acute intracranial hemorrhage, shift of the midline   structures, or hydrocephalus.    The paranasal sinuses and mastoid air cells are clear. The calvarium   appears intact. Bilateral senile scleral plaques are noted. There is   evidence of bilateral cataract removal.     IMPRESSION: Evolving acute/subacute infarcts within the high right   paramedian parietal and right occipital lobes. No hemorrhagic   transformation.    Other chronic findings, as discussed      < end of copied text >        On Neurological Examination:  Mental Status - Patient is alert, awake, oriented X3. Speech is fluent, able to  name and repeat. Impaired attention and concentration.  Follows commands well and able to answer questions appropriately. Mood and affect  normal. Able to recall 3/3 words immediately and 0/3 after 2 minutes.   Cranial Nerves - left facial asymmetry, no dysarthria, left field cut, left sided neglect however improvement in attending to the left side. Able to assess EOMI, which are intact, no nystagmus.   Motor Exam -   Right upper diminished AROM secondary to pain, 3/5 shoulder strength, 4/5 elbow strengths, 5/5 hand and wrist strengths.   Left upper 2/5 elbow extensor  Right lower 4+/5 hip flexor, 5/5 knee and DF/PF  Left lower 3/5 hip flexor, 4/5 knee and DF/PF  Sensory    Impaired to light touch left side/extinction to touch  No tremors  DTS Reflexes: 3+ left side  Toes are flexor     GENERAL Exam:     Nontoxic , No Acute Distress   HEENT:  normocephalic, atraumatic		  LUNGS:	Clear bilaterally    HEART:	 Normal S1S2      GI/ ABDOMEN:  Soft  Non tender +BS  SKIN: Dressing intact to left lower extremity                 Assessment and Recommendation:   · Assessment		  89 yo woman with h/o HTN and arthritis with acute right hemispheric MCA infarct with left hemiparesis, gait and ADL dysfunction, visual field deficits.     Recommend: Continue management per neurology and primary team. Continue to monitor telemetry.   Will await ST and PT evaluations. Will have to f/u with cardiology regarding prolonged hear monitoring and if AC will need to be started. Will re-evaluate patient after evaluations before making final recommendations for rehab services.

## 2018-10-12 NOTE — PROGRESS NOTE ADULT - SUBJECTIVE AND OBJECTIVE BOX
Follow up for  CVA history of AVR    SUBJ:  Lethargic/arousable no complaint    PMH  Hypertension, unspecified type      MEDICATIONS  (STANDING):  amLODIPine   Tablet 5 milliGRAM(s) Oral daily  aspirin  chewable 81 milliGRAM(s) Oral daily  atorvastatin 40 milliGRAM(s) Oral at bedtime  cloNIDine 0.1 milliGRAM(s) Oral every 8 hours  docusate sodium 100 milliGRAM(s) Oral three times a day  heparin  Injectable 5000 Unit(s) SubCutaneous every 12 hours  pantoprazole    Tablet 40 milliGRAM(s) Oral daily  polyethylene glycol 3350 17 Gram(s) Oral two times a day  predniSONE   Tablet 10 milliGRAM(s) Oral every 12 hours  senna 2 Tablet(s) Oral at bedtime    MEDICATIONS  (PRN):  acetaminophen   Tablet .. 650 milliGRAM(s) Oral every 6 hours PRN Temp greater or equal to 38C (100.4F), Moderate Pain (4 - 6)        PHYSICAL EXAM:  Vital Signs Last 24 Hrs  T(C): 36.4 (12 Oct 2018 07:00), Max: 36.6 (11 Oct 2018 15:00)  T(F): 97.5 (12 Oct 2018 07:00), Max: 97.9 (11 Oct 2018 15:00)  HR: 55 (12 Oct 2018 09:00) (51 - 60)  BP: 152/39 (12 Oct 2018 09:00) (107/47 - 217/55)  BP(mean): 73 (12 Oct 2018 09:00) (65 - 127)  RR: 15 (12 Oct 2018 09:00) (13 - 23)  SpO2: 96% (12 Oct 2018 09:00) (91% - 100%)    GENERAL: NAD, well-groomed, well-developed  HEAD:  Atraumatic, Normocephalic  EYES: EOMI, PERRLA, conjunctiva and sclera clear  NECK: Supple, No JVD, no bruits  CHEST/LUNG: Clear to percussion and auscultation bilaterally; No rales, rhonchi, wheezing, or rubs  HEART: Regular rate and rhythm; No murmurs, rubs, or gallops PMI non displaced.  ABDOMEN: Soft, Nontender, Nondistended; Bowel sounds present  EXTREMITIES:   No clubbing, cyanosis, or edema  SKIN: No rashes or lesions    TELEMETRY:sinus/sinus ashley      LABS:                        10.5   6.7   )-----------( 191      ( 12 Oct 2018 05:25 )             31.2     10-12    131<L>  |  101  |  35<H>  ----------------------------<  112<H>  5.3   |  24  |  1.45<H>    Ca    9.1      12 Oct 2018 05:25  Phos  4.3     10-12  Mg     2.3     10-12    TPro  6.6  /  Alb  2.5<L>  /  TBili  0.2  /  DBili  x   /  AST  29  /  ALT  25  /  AlkPhos  43  10-12            I&O's Summary    11 Oct 2018 07:01  -  12 Oct 2018 07:00  --------------------------------------------------------  IN: 1650 mL / OUT: 1050 mL / NET: 600 mL    12 Oct 2018 07:01  -  12 Oct 2018 09:48  --------------------------------------------------------  IN: 480 mL / OUT: 0 mL / NET: 480 mL      BNP    RADIOLOGY & ADDITIONAL STUDIES:    ECHO:

## 2018-10-12 NOTE — PROGRESS NOTE ADULT - SUBJECTIVE AND OBJECTIVE BOX
Patient is a 89y old  Female who presents with a chief complaint of Weakness (11 Oct 2018 17:13)      BRIEF HOSPITAL COURSE: 89 y/o female with pmhx of HTN, bilateral carotid endarterectomy in , AVR in  who presented on 10/5 with left upper extremity weakness and left sided facial droop. She had become symptomatic the day before but family decided to wait to see if symptoms improved. By time she came in to ED she was outside of window for tPA. In addition, she was hypertensive with systolic > 200. CT head showed evolving R MCA infarct. She was transferred out of the CCU on 10/7, then readmitted overnight 10/8-10/9 for worsening weakness, pupil discrepancy, and increasing lethargy as well as htn with sbp again > 200 requiring cardene infusion. [facility] neurointerventionalist recommended correcting hyponatremia and seeing if symptoms improved. repeat CT head showed stable infarct with no expansion or hemorrhagic conversion.       Events last 24 hours: received two doses of tolvaptan for SIADH with improvement in Na to 129. repeat BMP overnight showed drop in Na to 125. patient denies headache, dizziness, vision changes. Facial droop has improved drastically since I last saw patient two days ago. LUE weakness with minimal improvement.    PAST MEDICAL & SURGICAL HISTORY:  Hypertension, unspecified type  History of total hip replacement, right:  at [facility],  No complications  S/p bilateral carotid endarterectomy:  [facility], no complications  S/P AVR:  at [facility], Dr. Bermudez.  No complications      Review of Systems:  CONSTITUTIONAL: No fever, chills, or fatigue  EYES: No eye pain, visual disturbances, or discharge  ENMT:  No difficulty hearing, tinnitus, vertigo; No sinus or throat pain  NECK: No pain or stiffness  RESPIRATORY: No cough, wheezing, chills or hemoptysis; No shortness of breath  CARDIOVASCULAR: No chest pain, palpitations, dizziness, or leg swelling  GASTROINTESTINAL: No abdominal or epigastric pain. No nausea, vomiting, or hematemesis; No diarrhea or constipation. No melena or hematochezia.  GENITOURINARY: No dysuria, frequency, hematuria, or incontinence  NEUROLOGICAL: No headaches, memory loss, loss of strength, numbness, or tremors  SKIN: No itching, burning, rashes, or lesions   MUSCULOSKELETAL: No joint pain or swelling; No muscle, back, or extremity pain  PSYCHIATRIC: No depression, anxiety, mood swings, or difficulty sleeping      Medications:    cloNIDine 0.1 milliGRAM(s) Oral every 8 hours  metoprolol tartrate 25 milliGRAM(s) Oral two times a day      acetaminophen   Tablet .. 650 milliGRAM(s) Oral every 6 hours PRN      aspirin  chewable 81 milliGRAM(s) Oral daily  heparin  Injectable 5000 Unit(s) SubCutaneous every 12 hours    docusate sodium 100 milliGRAM(s) Oral three times a day  pantoprazole    Tablet 40 milliGRAM(s) Oral daily  polyethylene glycol 3350 17 Gram(s) Oral two times a day  senna 2 Tablet(s) Oral at bedtime      atorvastatin 40 milliGRAM(s) Oral at bedtime  predniSONE   Tablet 10 milliGRAM(s) Oral every 12 hours                  ICU Vital Signs Last 24 Hrs  T(C): 36.6 (12 Oct 2018 05:00), Max: 36.6 (11 Oct 2018 07:00)  T(F): 97.8 (12 Oct 2018 05:00), Max: 97.9 (11 Oct 2018 15:00)  HR: 55 (12 Oct 2018 05:00) (51 - 60)  BP: 217/55 (12 Oct 2018 05:00) (107/47 - 217/55)  BP(mean): 100 (12 Oct 2018 05:00) (65 - 133)  ABP: --  ABP(mean): --  RR: 14 (12 Oct 2018 05:00) (13 - 25)  SpO2: 98% (12 Oct 2018 05:00) (93% - 98%)          I&O's Detail    10 Oct 2018 07:01  -  11 Oct 2018 07:00  --------------------------------------------------------  IN:    dextrose 5% + sodium chloride 0.9%: 140 mL    Oral Fluid: 640 mL    sodium chloride 3%: 120 mL  Total IN: 900 mL    OUT:    Voided: 553 mL  Total OUT: 553 mL    Total NET: 347 mL      11 Oct 2018 07:01  -  12 Oct 2018 05:38  --------------------------------------------------------  IN:    Oral Fluid: 1450 mL    Solution: 200 mL  Total IN: 1650 mL    OUT:    Voided: 1050 mL  Total OUT: 1050 mL    Total NET: 600 mL            LABS:                        10.8   5.7   )-----------( 163      ( 11 Oct 2018 04:25 )             32.0     10-12    125<L>  |  100  |  34<H>  ----------------------------<  121<H>  5.4<H>   |  20<L>  |  1.44<H>    Ca    8.4      12 Oct 2018 01:05  Phos  4.0     10-11  Mg     1.7     10-11    TPro  6.8  /  Alb  2.4<L>  /  TBili  0.3  /  DBili  0.0  /  AST  36  /  ALT  25  /  AlkPhos  48  10-12          CAPILLARY BLOOD GLUCOSE          Urinalysis Basic - ( 10 Oct 2018 17:30 )    Color: Yellow / Appearance: Clear / S.015 / pH: x  Gluc: x / Ketone: Negative  / Bili: Negative / Urobili: Negative   Blood: x / Protein: Negative / Nitrite: Negative   Leuk Esterase: Negative / RBC: x / WBC x   Sq Epi: x / Non Sq Epi: x / Bacteria: x      CULTURES:      Physical Examination:    General: No acute distress.      HEENT: Pupils equal, reactive to light.  Symmetric.    PULM: Clear to auscultation bilaterally, no significant sputum production    NECK: Supple, no lymphadenopathy, trachea midline    CVS: Regular rate and rhythm, no murmurs, rubs, or gallops    ABD: Soft, nondistended, nontender, normoactive bowel sounds, no masses    EXT: No edema, nontender    SKIN: Warm and well perfused, no rashes noted.    NEURO: Alert, oriented, interactive, nonfocal. LUE strength 0/5. RUE strength assessment limited by arthritic pain but  strength 5/5. Pupils symmetric and equally responsive to light.     DEVICES: none    RADIOLOGY: no recent radiology

## 2018-10-12 NOTE — PROGRESS NOTE ADULT - PROBLEM SELECTOR PLAN 1
-Na down to 125 from 129. gave patient one salt tab, repeat BMP with morning labs pending.  -nephrology following  -q 6 hour bmp. avoid increase in Na greater than 6-8 meq in 24 hour period to prevent osmotic DMS.   -urine output improving (1050 cc over 11 hours) s/p two doses tolvaptan.  -avoid nephrotoxic agents -Na down to 125 from 129. gave patient one salt tab, repeat BMP with morning labs pending.  -nephrology following  -q 6 hour bmp. avoid increase in Na greater than 6-8 meq in 24 hour period to prevent osmotic DMS.   -urine output improving (1050 cc over 11 hours) s/p two doses tolvaptan. LFTs stable s/p tolvaptan  -avoid nephrotoxic agents

## 2018-10-13 LAB
ANION GAP SERPL CALC-SCNC: 6 MMOL/L — SIGNIFICANT CHANGE UP (ref 5–17)
ANION GAP SERPL CALC-SCNC: 7 MMOL/L — SIGNIFICANT CHANGE UP (ref 5–17)
BUN SERPL-MCNC: 33 MG/DL — HIGH (ref 7–23)
BUN SERPL-MCNC: 36 MG/DL — HIGH (ref 7–23)
CALCIUM SERPL-MCNC: 8.5 MG/DL — SIGNIFICANT CHANGE UP (ref 8.4–10.5)
CALCIUM SERPL-MCNC: 8.7 MG/DL — SIGNIFICANT CHANGE UP (ref 8.4–10.5)
CHLORIDE SERPL-SCNC: 102 MMOL/L — SIGNIFICANT CHANGE UP (ref 96–108)
CHLORIDE SERPL-SCNC: 102 MMOL/L — SIGNIFICANT CHANGE UP (ref 96–108)
CO2 SERPL-SCNC: 24 MMOL/L — SIGNIFICANT CHANGE UP (ref 22–31)
CO2 SERPL-SCNC: 26 MMOL/L — SIGNIFICANT CHANGE UP (ref 22–31)
CREAT SERPL-MCNC: 1.32 MG/DL — HIGH (ref 0.5–1.3)
CREAT SERPL-MCNC: 1.55 MG/DL — HIGH (ref 0.5–1.3)
GLUCOSE SERPL-MCNC: 104 MG/DL — HIGH (ref 70–99)
GLUCOSE SERPL-MCNC: 114 MG/DL — HIGH (ref 70–99)
HCT VFR BLD CALC: 29.8 % — LOW (ref 34.5–45)
HGB BLD-MCNC: 10 G/DL — LOW (ref 11.5–15.5)
MCHC RBC-ENTMCNC: 33.5 GM/DL — SIGNIFICANT CHANGE UP (ref 32–36)
MCHC RBC-ENTMCNC: 33.9 PG — SIGNIFICANT CHANGE UP (ref 27–34)
MCV RBC AUTO: 101.1 FL — HIGH (ref 80–100)
PLATELET # BLD AUTO: 172 K/UL — SIGNIFICANT CHANGE UP (ref 150–400)
POTASSIUM SERPL-MCNC: 5.3 MMOL/L — SIGNIFICANT CHANGE UP (ref 3.5–5.3)
POTASSIUM SERPL-MCNC: 5.6 MMOL/L — HIGH (ref 3.5–5.3)
POTASSIUM SERPL-SCNC: 5.3 MMOL/L — SIGNIFICANT CHANGE UP (ref 3.5–5.3)
POTASSIUM SERPL-SCNC: 5.6 MMOL/L — HIGH (ref 3.5–5.3)
RBC # BLD: 2.95 M/UL — LOW (ref 3.8–5.2)
RBC # FLD: 12.7 % — SIGNIFICANT CHANGE UP (ref 10.3–14.5)
SODIUM SERPL-SCNC: 133 MMOL/L — LOW (ref 135–145)
SODIUM SERPL-SCNC: 134 MMOL/L — LOW (ref 135–145)
WBC # BLD: 7.3 K/UL — SIGNIFICANT CHANGE UP (ref 3.8–10.5)
WBC # FLD AUTO: 7.3 K/UL — SIGNIFICANT CHANGE UP (ref 3.8–10.5)

## 2018-10-13 PROCEDURE — 99232 SBSQ HOSP IP/OBS MODERATE 35: CPT

## 2018-10-13 RX ORDER — LABETALOL HCL 100 MG
100 TABLET ORAL EVERY 12 HOURS
Qty: 0 | Refills: 0 | Status: DISCONTINUED | OUTPATIENT
Start: 2018-10-13 | End: 2018-10-14

## 2018-10-13 RX ORDER — SODIUM CHLORIDE 9 MG/ML
250 INJECTION, SOLUTION INTRAVENOUS ONCE
Qty: 0 | Refills: 0 | Status: COMPLETED | OUTPATIENT
Start: 2018-10-13 | End: 2018-10-13

## 2018-10-13 RX ORDER — LABETALOL HCL 100 MG
100 TABLET ORAL EVERY 12 HOURS
Qty: 0 | Refills: 0 | Status: DISCONTINUED | OUTPATIENT
Start: 2018-10-13 | End: 2018-10-13

## 2018-10-13 RX ADMIN — PANTOPRAZOLE SODIUM 40 MILLIGRAM(S): 20 TABLET, DELAYED RELEASE ORAL at 11:37

## 2018-10-13 RX ADMIN — SODIUM CHLORIDE 500 MILLILITER(S): 9 INJECTION, SOLUTION INTRAVENOUS at 11:11

## 2018-10-13 RX ADMIN — Medication 1 PATCH: at 07:30

## 2018-10-13 RX ADMIN — Medication 0.1 MILLIGRAM(S): at 06:19

## 2018-10-13 RX ADMIN — AMLODIPINE BESYLATE 5 MILLIGRAM(S): 2.5 TABLET ORAL at 06:19

## 2018-10-13 RX ADMIN — Medication 650 MILLIGRAM(S): at 16:21

## 2018-10-13 RX ADMIN — Medication 650 MILLIGRAM(S): at 17:20

## 2018-10-13 RX ADMIN — HEPARIN SODIUM 5000 UNIT(S): 5000 INJECTION INTRAVENOUS; SUBCUTANEOUS at 17:26

## 2018-10-13 RX ADMIN — Medication 100 MILLIGRAM(S): at 06:19

## 2018-10-13 RX ADMIN — HEPARIN SODIUM 5000 UNIT(S): 5000 INJECTION INTRAVENOUS; SUBCUTANEOUS at 06:19

## 2018-10-13 RX ADMIN — Medication 81 MILLIGRAM(S): at 11:37

## 2018-10-13 RX ADMIN — Medication 100 MILLIGRAM(S): at 18:04

## 2018-10-13 RX ADMIN — ATORVASTATIN CALCIUM 40 MILLIGRAM(S): 80 TABLET, FILM COATED ORAL at 22:41

## 2018-10-13 NOTE — PROGRESS NOTE ADULT - SUBJECTIVE AND OBJECTIVE BOX
Seen in ICU, awake, appetite fair    Vital Signs Last 24 Hrs  T(C): 36.8 (10-13-18 @ 19:21), Max: 36.8 (10-13-18 @ 19:21)  T(F): 98.2 (10-13-18 @ 19:21), Max: 98.2 (10-13-18 @ 19:21)  HR: 66 (10-13-18 @ 19:21) (56 - 68)  BP: 148/87 (10-13-18 @ 19:21) (103/43 - 189/46)  BP(mean): 78 (10-13-18 @ 18:00) (61 - 88)  RR: 20 (10-13-18 @ 19:21) (14 - 25)  SpO2: 99% (10-13-18 @ 19:21) (88% - 100%)    Gen no distress  Lungs good air entry b/l  Abd soft, ND  Extr no edema  Skin no rash                                                  10.0   7.3   )-----------( 172      ( 13 Oct 2018 07:50 )             29.8     13 Oct 2018 17:30    134    |  102    |  36     ----------------------------<  114    5.6     |  26     |  1.55     Ca    8.5        13 Oct 2018 17:30  Phos  4.3       12 Oct 2018 05:25  Mg     2.3       12 Oct 2018 05:25    TPro  6.6    /  Alb  2.5    /  TBili  0.2    /  DBili  x      /  AST  29     /  ALT  25     /  AlkPhos  43     12 Oct 2018 05:25    LIVER FUNCTIONS - ( 12 Oct 2018 05:25 )  Alb: 2.5 g/dL / Pro: 6.6 g/dL / ALK PHOS: 43 U/L / ALT: 25 U/L DA / AST: 29 U/L / GGT: x           acetaminophen   Tablet .. 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

## 2018-10-13 NOTE — PROGRESS NOTE ADULT - SUBJECTIVE AND OBJECTIVE BOX
· Subjective and Objective: 	  Follow up for  CVA history of AVR    SUBJ:  Lethargic/arousable no complaint    PMH  Hypertension, unspecified type      MEDICATIONS  (STANDING):  aspirin  chewable 81 milliGRAM(s) Oral daily  atorvastatin 40 milliGRAM(s) Oral at bedtime  cloNIDine 0.1 milliGRAM(s) Oral every 8 hours  docusate sodium 100 milliGRAM(s) Oral three times a day  heparin  Injectable 5000 Unit(s) SubCutaneous every 12 hours  labetalol 100 milliGRAM(s) Oral every 8 hours  pantoprazole    Tablet 40 milliGRAM(s) Oral daily  polyethylene glycol 3350 17 Gram(s) Oral two times a day  senna 2 Tablet(s) Oral at bedtime    MEDICATIONS  (PRN):  acetaminophen   Tablet .. 650 milliGRAM(s) Oral every 6 hours PRN Temp greater or equal to 38C (100.4F), Moderate Pain (4 - 6)        PHYSICAL EXAM:  Vital Signs Last 24 Hrs  T(C): 36.5 (13 Oct 2018 09:00), Max: 36.6 (12 Oct 2018 15:00)  T(F): 97.7 (13 Oct 2018 09:00), Max: 97.8 (12 Oct 2018 15:00)  HR: 58 (13 Oct 2018 11:00) (56 - 68)  BP: 103/43 (13 Oct 2018 11:00) (103/43 - 189/46)  BP(mean): 61 (13 Oct 2018 11:00) (60 - 88)  RR: 19 (13 Oct 2018 11:00) (14 - 22)  SpO2: 98% (13 Oct 2018 11:00) (88% - 100%)    GENERAL: NAD, well-groomed, well-developed  HEAD:  Atraumatic, Normocephalic  EYES: EOMI, PERRLA, conjunctiva and sclera clear  ENT: Moist mucous membranes,  NECK: Supple, No JVD, no bruits  CHEST/LUNG: Clear to percussion bilaterally; No rales, rhonchi, wheezing, or rubs  HEART: Regular rate and rhythm; No murmurs, rubs, or gallops PMI non displaced.  ABDOMEN: Soft, Nontender, Nondistended; Bowel sounds present  EXTREMITIES:  2+ Peripheral Pulses, No clubbing, cyanosis, or edema  SKIN: No rashes or lesions  NERVOUS SYSTEM:  Alert & Oriented X3, Good concentration; Motor Strength 5/5 B/L upper and lower extremities; DTRs 2+ intact and symmetric      TELEMETRY:    ECG:    LABS:                        10.0   7.3   )-----------( 172      ( 13 Oct 2018 07:50 )             29.8     10-13    133<L>  |  102  |  33<H>  ----------------------------<  104<H>  5.3   |  24  |  1.32<H>    Ca    8.7      13 Oct 2018 07:50  Phos  4.3     10-12  Mg     2.3     10-12    TPro  6.6  /  Alb  2.5<L>  /  TBili  0.2  /  DBili  x   /  AST  29  /  ALT  25  /  AlkPhos  43  10-12            I&O's Summary    12 Oct 2018 07:01  -  13 Oct 2018 07:00  --------------------------------------------------------  IN: 2210 mL / OUT: 200 mL / NET: 2010 mL    13 Oct 2018 07:01  -  13 Oct 2018 11:33  --------------------------------------------------------  IN: 610 mL / OUT: 0 mL / NET: 610 mL              ASSESMENT AND PLAN:  Assessment and Plan:   · Assessment		  s/p CVA, history of AVR, Improved hyponatremia.   No AF identified on monitoring.  Consider outpatient monitoring post discharge ( loop or event recorder)    Attending Attestation:   Plan discussed with Jessenia.

## 2018-10-13 NOTE — PROGRESS NOTE ADULT - SUBJECTIVE AND OBJECTIVE BOX
Follow-up Critical Care Progress Note  Chief Complaint : Other symptom or sign involving musculoskeletal system        No new events overnight.  Denies SOB/CP.   BP improved and stable  Na stable      dtr updated.     Allergies :ACE inhibitors (Other)  hydrALAZINE (Other)      PAST MEDICAL & SURGICAL HISTORY:  Hypertension, unspecified type  History of total hip replacement, right: 2014 at [facility],  No complications  S/p bilateral carotid endarterectomy: 2008 st [facility], no complications  S/P AVR: 2008 at [facility], Dr. Bermudez.  No complications      Medications:  MEDICATIONS  (STANDING):  amLODIPine   Tablet 5 milliGRAM(s) Oral daily  aspirin  chewable 81 milliGRAM(s) Oral daily  atorvastatin 40 milliGRAM(s) Oral at bedtime  cloNIDine 0.1 milliGRAM(s) Oral every 8 hours  docusate sodium 100 milliGRAM(s) Oral three times a day  heparin  Injectable 5000 Unit(s) SubCutaneous every 12 hours  labetalol 100 milliGRAM(s) Oral every 8 hours  pantoprazole    Tablet 40 milliGRAM(s) Oral daily  polyethylene glycol 3350 17 Gram(s) Oral two times a day  senna 2 Tablet(s) Oral at bedtime    MEDICATIONS  (PRN):  acetaminophen   Tablet .. 650 milliGRAM(s) Oral every 6 hours PRN Temp greater or equal to 38C (100.4F), Moderate Pain (4 - 6)      LABS:                        10.0   7.3   )-----------( 172      ( 13 Oct 2018 07:50 )             29.8     10-13    133<L>  |  102  |  33<H>  ----------------------------<  104<H>  5.3   |  24  |  1.32<H>    Ca    8.7      13 Oct 2018 07:50  Phos  4.3     10-12  Mg     2.3     10-12    TPro  6.6  /  Alb  2.5<L>  /  TBili  0.2  /  DBili  x   /  AST  29  /  ALT  25  /  AlkPhos  43  10-12            VITALS:  T(C): 36.5 (10-13-18 @ 09:00), Max: 36.6 (10-12-18 @ 11:00)  T(F): 97.7 (10-13-18 @ 09:00), Max: 97.9 (10-12-18 @ 11:00)  HR: 63 (10-13-18 @ 10:00) (56 - 68)  BP: 141/45 (10-13-18 @ 10:00) (115/46 - 189/46)  BP(mean): 74 (10-13-18 @ 10:00) (60 - 88)  ABP: --  ABP(mean): --  RR: 15 (10-13-18 @ 10:00) (14 - 22)  SpO2: 100% (10-13-18 @ 10:00) (88% - 100%)  CVP(mm Hg): --  CVP(cm H2O): --    Ins and Outs     10-12-18 @ 07:01  -  10-13-18 @ 07:00  --------------------------------------------------------  IN: 2210 mL / OUT: 200 mL / NET: 2010 mL    10-13-18 @ 07:01  -  10-13-18 @ 10:43  --------------------------------------------------------  IN: 360 mL / OUT: 0 mL / NET: 360 mL          Weight (kg): 57.1 (10-12-18 @ 06:00)

## 2018-10-13 NOTE — PROGRESS NOTE ADULT - ASSESSMENT
Physical Examination:  GENERAL:               Alert responsive, No acute distress.    HEENT:                  eyes reactive, do not cross midline to left Symmetric. No JVD, Moist MM  PULM:                     Bilateral air entry, Clear to auscultation bilaterally, no significant sputum production, No Rales, No Rhonchi, No Wheezing  CVS:                         S1, S2,  +Murmur  ABD:                        Soft, nondistended, nontender, normoactive bowel sounds,   EXT:                         No edema, nontender, No Cyanosis or Clubbing   Vascular:                Warm Extremities, Normal Capillary refill, Normal Distal Pulses  SKIN:                       Warm and well perfused, no rashes noted.   NEURO:                  Alert , interactive, Left HP with 2/5 LUE - LLE 4/5 Right side 5/5, left hemineglect   PSYC:                      Calm, No Insight.        Assessment  Metabolic encephelopathy ; Resolved  acute hyponatremia ? SIADH vs Cerebral Salt Waisting improving  acute hypoglycemia unsure of etiology   Acute CVA with Left HP , not a candidate for tpa on presentation r/o cardio embolic as multiple locations on ct.  labile BP, in pt with underlying HTN  VHD s/p AVR  no known history of DM2 -Hemoglobin A1C, Whole Blood: 5.7 %        Plan   D/W Renal - Continue current care, can check Na q daily,      dtr wants q12   D/W Endo - d/c steroids today, isolated hypoglycemia unsure of cause         sBP control keep 140-180  bp meds changed with norvasc,  labatolol, clonodine'  stagger meds as possible    Regular diet  asa, statin  check vitals q 4 hrs  1:1 observation to continue  supportive care     Family Updated: 	 Dtr bedside  Date: 10/13    Sedation & Analgesia:	on hold  Diet/Nutrition:		as tolerated, advance as per mental status  GI PPx:			PPI    DVT Ppx:		Heparin    Disposition: will transfer to [facility], [facility] d/w Dr. Tolentino who will accept pt upon transfer.     Goals of Care: Full Code.

## 2018-10-13 NOTE — PROVIDER CONTACT NOTE (MEDICATION) - ACTION/TREATMENT ORDERED:
MD consulted, pt's daughter is an NP and refusing to allow pt to take BP medication
Conferring with Dr Ruelas, will most likely give Labetelol, awaiting order

## 2018-10-14 LAB
ALBUMIN SERPL ELPH-MCNC: 2.5 G/DL — LOW (ref 3.3–5)
ALBUMIN SERPL ELPH-MCNC: 2.7 G/DL — LOW (ref 3.3–5)
ALP SERPL-CCNC: 45 U/L — SIGNIFICANT CHANGE UP (ref 40–120)
ALP SERPL-CCNC: 54 U/L — SIGNIFICANT CHANGE UP (ref 40–120)
ALT FLD-CCNC: 24 U/L DA — SIGNIFICANT CHANGE UP (ref 10–45)
ALT FLD-CCNC: 26 U/L DA — SIGNIFICANT CHANGE UP (ref 10–45)
ANION GAP SERPL CALC-SCNC: 5 MMOL/L — SIGNIFICANT CHANGE UP (ref 5–17)
AST SERPL-CCNC: 26 U/L — SIGNIFICANT CHANGE UP (ref 10–40)
AST SERPL-CCNC: 28 U/L — SIGNIFICANT CHANGE UP (ref 10–40)
BILIRUB DIRECT SERPL-MCNC: 0.1 MG/DL — SIGNIFICANT CHANGE UP (ref 0–0.2)
BILIRUB DIRECT SERPL-MCNC: 0.1 MG/DL — SIGNIFICANT CHANGE UP (ref 0–0.2)
BILIRUB INDIRECT FLD-MCNC: 0.1 MG/DL — LOW (ref 0.2–1)
BILIRUB INDIRECT FLD-MCNC: 0.1 MG/DL — LOW (ref 0.2–1)
BILIRUB SERPL-MCNC: 0.2 MG/DL — SIGNIFICANT CHANGE UP (ref 0.2–1.2)
BILIRUB SERPL-MCNC: 0.2 MG/DL — SIGNIFICANT CHANGE UP (ref 0.2–1.2)
BUN SERPL-MCNC: 38 MG/DL — HIGH (ref 7–23)
CALCIUM SERPL-MCNC: 8.8 MG/DL — SIGNIFICANT CHANGE UP (ref 8.4–10.5)
CHLORIDE SERPL-SCNC: 101 MMOL/L — SIGNIFICANT CHANGE UP (ref 96–108)
CK SERPL-CCNC: 33 U/L — SIGNIFICANT CHANGE UP (ref 25–170)
CK SERPL-CCNC: 39 U/L — SIGNIFICANT CHANGE UP (ref 25–170)
CO2 SERPL-SCNC: 27 MMOL/L — SIGNIFICANT CHANGE UP (ref 22–31)
CORTICOSTEROID BINDING GLOBULIN RESULT: 2.4 MG/DL — SIGNIFICANT CHANGE UP
CORTIS F/TOTAL MFR SERPL: 6.4 % — SIGNIFICANT CHANGE UP
CORTIS SERPL-MCNC: 12 UG/DL — SIGNIFICANT CHANGE UP
CORTISOL, FREE RESULT: 0.77 UG/DL — SIGNIFICANT CHANGE UP
CREAT SERPL-MCNC: 1.62 MG/DL — HIGH (ref 0.5–1.3)
GLUCOSE SERPL-MCNC: 109 MG/DL — HIGH (ref 70–99)
HCT VFR BLD CALC: 31.7 % — LOW (ref 34.5–45)
HGB BLD-MCNC: 10 G/DL — LOW (ref 11.5–15.5)
MAGNESIUM SERPL-MCNC: 1.7 MG/DL — SIGNIFICANT CHANGE UP (ref 1.6–2.6)
MCHC RBC-ENTMCNC: 31.7 GM/DL — LOW (ref 32–36)
MCHC RBC-ENTMCNC: 32.1 PG — SIGNIFICANT CHANGE UP (ref 27–34)
MCV RBC AUTO: 101.1 FL — HIGH (ref 80–100)
PHOSPHATE SERPL-MCNC: 4.7 MG/DL — HIGH (ref 2.5–4.5)
PLATELET # BLD AUTO: 185 K/UL — SIGNIFICANT CHANGE UP (ref 150–400)
POTASSIUM SERPL-MCNC: 5.4 MMOL/L — HIGH (ref 3.5–5.3)
POTASSIUM SERPL-SCNC: 5.4 MMOL/L — HIGH (ref 3.5–5.3)
PROT SERPL-MCNC: 6.3 G/DL — SIGNIFICANT CHANGE UP (ref 6–8.3)
PROT SERPL-MCNC: 6.9 G/DL — SIGNIFICANT CHANGE UP (ref 6–8.3)
RBC # BLD: 3.13 M/UL — LOW (ref 3.8–5.2)
RBC # FLD: 12.3 % — SIGNIFICANT CHANGE UP (ref 10.3–14.5)
SODIUM SERPL-SCNC: 133 MMOL/L — LOW (ref 135–145)
WBC # BLD: 7 K/UL — SIGNIFICANT CHANGE UP (ref 3.8–10.5)
WBC # FLD AUTO: 7 K/UL — SIGNIFICANT CHANGE UP (ref 3.8–10.5)

## 2018-10-14 RX ORDER — HYDROMORPHONE HYDROCHLORIDE 2 MG/ML
0.5 INJECTION INTRAMUSCULAR; INTRAVENOUS; SUBCUTANEOUS ONCE
Qty: 0 | Refills: 0 | Status: DISCONTINUED | OUTPATIENT
Start: 2018-10-14 | End: 2018-10-14

## 2018-10-14 RX ORDER — LABETALOL HCL 100 MG
100 TABLET ORAL EVERY 8 HOURS
Qty: 0 | Refills: 0 | Status: DISCONTINUED | OUTPATIENT
Start: 2018-10-14 | End: 2018-10-16

## 2018-10-14 RX ORDER — DIPHENHYDRAMINE HCL 50 MG
25 CAPSULE ORAL ONCE
Qty: 0 | Refills: 0 | Status: COMPLETED | OUTPATIENT
Start: 2018-10-14 | End: 2018-10-14

## 2018-10-14 RX ADMIN — Medication 30 MILLILITER(S): at 21:59

## 2018-10-14 RX ADMIN — Medication 100 MILLIGRAM(S): at 21:59

## 2018-10-14 RX ADMIN — Medication 100 MILLIGRAM(S): at 15:43

## 2018-10-14 RX ADMIN — Medication 650 MILLIGRAM(S): at 01:01

## 2018-10-14 RX ADMIN — Medication 100 MILLIGRAM(S): at 08:35

## 2018-10-14 RX ADMIN — HYDROMORPHONE HYDROCHLORIDE 0.5 MILLIGRAM(S): 2 INJECTION INTRAMUSCULAR; INTRAVENOUS; SUBCUTANEOUS at 02:51

## 2018-10-14 RX ADMIN — Medication 650 MILLIGRAM(S): at 23:59

## 2018-10-14 RX ADMIN — Medication 25 MILLIGRAM(S): at 22:59

## 2018-10-14 RX ADMIN — Medication 650 MILLIGRAM(S): at 22:59

## 2018-10-14 RX ADMIN — HEPARIN SODIUM 5000 UNIT(S): 5000 INJECTION INTRAVENOUS; SUBCUTANEOUS at 06:14

## 2018-10-14 RX ADMIN — Medication 100 MILLIGRAM(S): at 06:14

## 2018-10-14 RX ADMIN — PANTOPRAZOLE SODIUM 40 MILLIGRAM(S): 20 TABLET, DELAYED RELEASE ORAL at 12:05

## 2018-10-14 RX ADMIN — Medication 0.1 MILLIGRAM(S): at 06:14

## 2018-10-14 RX ADMIN — Medication 81 MILLIGRAM(S): at 12:05

## 2018-10-14 RX ADMIN — HYDROMORPHONE HYDROCHLORIDE 0.5 MILLIGRAM(S): 2 INJECTION INTRAMUSCULAR; INTRAVENOUS; SUBCUTANEOUS at 06:10

## 2018-10-14 RX ADMIN — Medication 650 MILLIGRAM(S): at 02:01

## 2018-10-14 RX ADMIN — Medication 100 MILLIGRAM(S): at 14:41

## 2018-10-14 NOTE — PROGRESS NOTE ADULT - ASSESSMENT
Physical Examination:  GENERAL:               Alert responsive, No acute distress.    HEENT:                  eyes reactive, do not cross midline to left Symmetric. No JVD, Moist MM  PULM:                     Bilateral air entry, Clear to auscultation bilaterally, no significant sputum production, No Rales, No Rhonchi, No Wheezing  CVS:                         S1, S2,  +Murmur  ABD:                        Soft, nondistended, nontender, normoactive bowel sounds,   EXT:                         No edema, nontender, No Cyanosis or Clubbing   Vascular:                Warm Extremities, Normal Capillary refill, Normal Distal Pulses  SKIN:                       Warm and well perfused, no rashes noted.   NEURO:                  Alert , interactive, Left HP with 2/5 LUE - LLE 4/5 Right side 5/5, left hemineglect   PSYC:                      Calm, No Insight.        Assessment  Metabolic encephelopathy ; Resolved  acute hyponatremia ? SIADH vs Cerebral Salt Waisting improving  acute hypoglycemia unsure of etiology   Acute CVA with Left HP , not a candidate for tpa on presentation r/o cardio embolic as multiple locations on ct.  labile BP, in pt with underlying HTN  VHD s/p AVR  no known history of DM2 -Hemoglobin A1C, Whole Blood: 5.7 %        Plan   D/W Renal - Continue current care, can check Na q daily,      dtr wants q12   sBP control keep 140-180  bp meds changed with  labatolol, clonodine'  stagger meds as possible    Regular diet  asa, statin  check vitals q 4 hrs  1:1 observation to continue  supportive care     Family Updated: 	 Dtr bedside  Date: 10/13    Sedation & Analgesia:	on hold  Diet/Nutrition:		as tolerated, advance as per mental status  GI PPx:			PPI    DVT Ppx:		Heparin    Disposition: will transfer to [facility], case d/w Dr. Tolentino who will accept pt upon transfer.     Goals of Care: Full Code.

## 2018-10-14 NOTE — PROGRESS NOTE ADULT - SUBJECTIVE AND OBJECTIVE BOX
Follow-up Critical Care Progress Note  Chief Complaint : Other symptom or sign involving musculoskeletal system      pt c/o pain and received Dilaudid as per dtr, pt has issues with morphine, codine, oxycodone, tramadol  bp been stable since yesterday  no new neurological issues noted.        Allergies :ACE inhibitors (Other)  hydrALAZINE (Other)      PAST MEDICAL & SURGICAL HISTORY:  Hypertension, unspecified type  History of total hip replacement, right: 2014 at [facility],  No complications  S/p bilateral carotid endarterectomy: 2008 st [facility], no complications  S/P AVR: 2008 at [facility], Dr. Bermudez.  No complications      Medications:  MEDICATIONS  (STANDING):  aspirin  chewable 81 milliGRAM(s) Oral daily  atorvastatin 40 milliGRAM(s) Oral at bedtime  cloNIDine 0.1 milliGRAM(s) Oral every 8 hours  docusate sodium 100 milliGRAM(s) Oral three times a day  heparin  Injectable 5000 Unit(s) SubCutaneous every 12 hours  labetalol 100 milliGRAM(s) Oral every 12 hours  pantoprazole    Tablet 40 milliGRAM(s) Oral daily  polyethylene glycol 3350 17 Gram(s) Oral two times a day  senna 2 Tablet(s) Oral at bedtime    MEDICATIONS  (PRN):  acetaminophen   Tablet .. 650 milliGRAM(s) Oral every 6 hours PRN Temp greater or equal to 38C (100.4F), Moderate Pain (4 - 6)      LABS:                        10.0   7.0   )-----------( 185      ( 14 Oct 2018 05:55 )             31.7     10-14    133<L>  |  101  |  38<H>  ----------------------------<  109<H>  5.4<H>   |  27  |  1.62<H>    Ca    8.8      14 Oct 2018 05:55  Phos  4.7     10-14  Mg     1.7     10-14                  VITALS:  T(C): 36.7 (10-14-18 @ 09:00), Max: 36.8 (10-13-18 @ 19:21)  T(F): 98.1 (10-14-18 @ 09:00), Max: 98.2 (10-13-18 @ 19:21)  HR: 59 (10-14-18 @ 09:00) (58 - 69)  BP: 161/64 (10-14-18 @ 09:00) (103/43 - 187/46)  BP(mean): 83 (10-14-18 @ 09:00) (61 - 88)  ABP: --  ABP(mean): --  RR: 14 (10-14-18 @ 09:00) (13 - 25)  SpO2: 100% (10-14-18 @ 09:00) (94% - 100%)  CVP(mm Hg): --  CVP(cm H2O): --    Ins and Outs     10-13-18 @ 07:01  -  10-14-18 @ 07:00  --------------------------------------------------------  IN: 1590 mL / OUT: 0 mL / NET: 1590 mL          Weight (kg): 57.1 (10-12-18 @ 06:00)

## 2018-10-14 NOTE — PROGRESS NOTE ADULT - SUBJECTIVE AND OBJECTIVE BOX
Awake, appetite fair    Vital Signs Last 24 Hrs  T(C): 36.7 (10-14-18 @ 13:00), Max: 36.7 (10-13-18 @ 23:00)  T(F): 98 (10-14-18 @ 13:00), Max: 98.1 (10-14-18 @ 09:00)  HR: 64 (10-14-18 @ 14:00) (59 - 72)  BP: 151/38 (10-14-18 @ 14:00) (115/41 - 187/46)  BP(mean): 70 (10-14-18 @ 14:00) (60 - 88)  RR: 21 (10-14-18 @ 14:00) (13 - 25)  SpO2: 96% (10-14-18 @ 14:00) (94% - 100%)    Gen no distress  Lungs good air entry b/l  Abd soft, ND  Extr no edema  Skin no rash                                                           10.0   7.0   )-----------( 185      ( 14 Oct 2018 05:55 )             31.7     14 Oct 2018 05:55    133    |  101    |  38     ----------------------------<  109    5.4     |  27     |  1.62     Ca    8.8        14 Oct 2018 05:55  Phos  4.7       14 Oct 2018 05:55  Mg     1.7       14 Oct 2018 05:55    TPro  6.9    /  Alb  2.7    /  TBili  0.2    /  DBili  0.1    /  AST  26     /  ALT  24     /  AlkPhos  54     14 Oct 2018 16:30    LIVER FUNCTIONS - ( 14 Oct 2018 16:30 )  Alb: 2.7 g/dL / Pro: 6.9 g/dL / ALK PHOS: 54 U/L / ALT: 24 U/L DA / AST: 26 U/L / GGT: x            acetaminophen   Tablet .. 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

## 2018-10-15 DIAGNOSIS — R29.898 OTHER SYMPTOMS AND SIGNS INVOLVING THE MUSCULOSKELETAL SYSTEM: ICD-10-CM

## 2018-10-15 LAB
ANION GAP SERPL CALC-SCNC: 5 MMOL/L — SIGNIFICANT CHANGE UP (ref 5–17)
BUN SERPL-MCNC: 31 MG/DL — HIGH (ref 7–23)
CALCIUM SERPL-MCNC: 9 MG/DL — SIGNIFICANT CHANGE UP (ref 8.4–10.5)
CHLORIDE SERPL-SCNC: 98 MMOL/L — SIGNIFICANT CHANGE UP (ref 96–108)
CO2 SERPL-SCNC: 26 MMOL/L — SIGNIFICANT CHANGE UP (ref 22–31)
CREAT SERPL-MCNC: 1.4 MG/DL — HIGH (ref 0.5–1.3)
GLUCOSE SERPL-MCNC: 102 MG/DL — HIGH (ref 70–99)
POTASSIUM SERPL-MCNC: 5 MMOL/L — SIGNIFICANT CHANGE UP (ref 3.5–5.3)
POTASSIUM SERPL-SCNC: 5 MMOL/L — SIGNIFICANT CHANGE UP (ref 3.5–5.3)
SODIUM SERPL-SCNC: 129 MMOL/L — LOW (ref 135–145)

## 2018-10-15 PROCEDURE — 99222 1ST HOSP IP/OBS MODERATE 55: CPT

## 2018-10-15 PROCEDURE — 99232 SBSQ HOSP IP/OBS MODERATE 35: CPT

## 2018-10-15 PROCEDURE — 99233 SBSQ HOSP IP/OBS HIGH 50: CPT

## 2018-10-15 RX ORDER — ATORVASTATIN CALCIUM 80 MG/1
10 TABLET, FILM COATED ORAL AT BEDTIME
Qty: 0 | Refills: 0 | Status: DISCONTINUED | OUTPATIENT
Start: 2018-10-15 | End: 2018-10-15

## 2018-10-15 RX ORDER — HYDROMORPHONE HYDROCHLORIDE 2 MG/ML
0.5 INJECTION INTRAMUSCULAR; INTRAVENOUS; SUBCUTANEOUS ONCE
Qty: 0 | Refills: 0 | Status: DISCONTINUED | OUTPATIENT
Start: 2018-10-15 | End: 2018-10-15

## 2018-10-15 RX ORDER — TOLVAPTAN 15 MG/1
15 TABLET ORAL ONCE
Qty: 0 | Refills: 0 | Status: COMPLETED | OUTPATIENT
Start: 2018-10-15 | End: 2018-10-15

## 2018-10-15 RX ADMIN — HYDROMORPHONE HYDROCHLORIDE 0.5 MILLIGRAM(S): 2 INJECTION INTRAMUSCULAR; INTRAVENOUS; SUBCUTANEOUS at 07:11

## 2018-10-15 RX ADMIN — Medication 650 MILLIGRAM(S): at 22:40

## 2018-10-15 RX ADMIN — HYDROMORPHONE HYDROCHLORIDE 0.5 MILLIGRAM(S): 2 INJECTION INTRAMUSCULAR; INTRAVENOUS; SUBCUTANEOUS at 06:56

## 2018-10-15 RX ADMIN — TOLVAPTAN 15 MILLIGRAM(S): 15 TABLET ORAL at 13:34

## 2018-10-15 RX ADMIN — POLYETHYLENE GLYCOL 3350 17 GRAM(S): 17 POWDER, FOR SOLUTION ORAL at 17:47

## 2018-10-15 RX ADMIN — Medication 100 MILLIGRAM(S): at 05:28

## 2018-10-15 RX ADMIN — PANTOPRAZOLE SODIUM 40 MILLIGRAM(S): 20 TABLET, DELAYED RELEASE ORAL at 12:32

## 2018-10-15 RX ADMIN — Medication 0.1 MILLIGRAM(S): at 05:28

## 2018-10-15 RX ADMIN — Medication 100 MILLIGRAM(S): at 13:34

## 2018-10-15 RX ADMIN — Medication 81 MILLIGRAM(S): at 12:32

## 2018-10-15 RX ADMIN — HEPARIN SODIUM 5000 UNIT(S): 5000 INJECTION INTRAVENOUS; SUBCUTANEOUS at 17:47

## 2018-10-15 RX ADMIN — Medication 100 MILLIGRAM(S): at 22:38

## 2018-10-15 RX ADMIN — Medication 650 MILLIGRAM(S): at 23:40

## 2018-10-15 RX ADMIN — HEPARIN SODIUM 5000 UNIT(S): 5000 INJECTION INTRAVENOUS; SUBCUTANEOUS at 05:28

## 2018-10-15 NOTE — PROGRESS NOTE ADULT - ATTENDING COMMENTS
Pt. seen. Agree with documentation by NP with following amendments.    Exam: Alert, Ox3  Cardio: RRR  lungs: cta   Abd: soft, NT  Neuro: Ox3, follows commands,  Impaired attention and recall  CN: poor visual acuity,  Left gaze preference--Follows to midline, no facial asymmetry,   Motor:    Right upper diminished AROM secondary to pain, 3/5 shoulder strength, 4/5 elbow strengths,  hand and wrist strengths.   Left upper increased tone to left upper extremity, 2/5 motor shoulder and elbow, 0/5 wrist and fingers  Right lower 4+/5 hip flexor, 4/5 knee and DF/PF  Left lower 3/5 hip flexor, 4/5 knee and DF/PF  Sensation impaired on left  Proprioception impaired    Functional:  max A for transfer from sit to stand.  Was unable to ambulate with pt. as pt. had poor tolerance due to decreased endurance.    Patient limited by left sided weakness, neglect and very poor endurance.  More appropriate candidate for BEATRIS given pt's endurance and tolerance to therapy.

## 2018-10-15 NOTE — PROGRESS NOTE ADULT - PROBLEM SELECTOR PLAN 1
Daughter (antonio) insisted stopping atorvastatin due to muscle aches at night.  She was informed that Atorvastatin is the best evidence based medication for secondary clot prevention and it was possible muscle pain is not being caused by the statin.

## 2018-10-15 NOTE — PROGRESS NOTE ADULT - SUBJECTIVE AND OBJECTIVE BOX
Follow-up Critical Care Progress Note  Chief Complaint : Other symptom or sign involving musculoskeletal system    Awake, eating breakfast with daughter's assistance. As per daughter had over all body aches. 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    Allergies :ACE inhibitors (Other)  hydrALAZINE (Other)    PAST MEDICAL & SURGICAL HISTORY:  Hypertension, unspecified type  History of total hip replacement, right: 2014 at [facility],  No complications  S/p bilateral carotid endarterectomy: 2008 [facility], no complications  S/P AVR: 2008 at [facility], Dr. Bermudez.  No complications    Medications:  MEDICATIONS  (STANDING):  aspirin  chewable 81 milliGRAM(s) Oral daily  cloNIDine 0.1 milliGRAM(s) Oral daily  docusate sodium 100 milliGRAM(s) Oral three times a day  heparin  Injectable 5000 Unit(s) SubCutaneous every 12 hours  labetalol 100 milliGRAM(s) Oral every 8 hours  pantoprazole    Tablet 40 milliGRAM(s) Oral daily  polyethylene glycol 3350 17 Gram(s) Oral two times a day  pravastatin 40 milliGRAM(s) Oral at bedtime  senna 2 Tablet(s) Oral at bedtime    MEDICATIONS  (PRN):  acetaminophen   Tablet .. 650 milliGRAM(s) Oral every 6 hours PRN Temp greater or equal to 38C (100.4F), Moderate Pain (4 - 6)      LABS:                        10.0   7.0   )-----------( 185      ( 14 Oct 2018 05:55 )             31.7     10-15    129<L>  |  98  |  31<H>  ----------------------------<  102<H>  5.0   |  26  |  1.40<H>    Ca    9.0      15 Oct 2018 06:20  Phos  4.7     10-14  Mg     1.7     10-14    TPro  6.9  /  Alb  2.7<L>  /  TBili  0.2  /  DBili  0.1  /  AST  26  /  ALT  24  /  AlkPhos  54  10-14      CARDIAC MARKERS ( 14 Oct 2018 16:30 )  x     / x     / 39 U/L / x     / x      CARDIAC MARKERS ( 14 Oct 2018 05:55 )  x     / x     / 33 U/L / x     / x        VITALS:  T(C): 36.8 (10-15-18 @ 09:16), Max: 37 (10-14-18 @ 22:09)  T(F): 98.2 (10-15-18 @ 09:16), Max: 98.6 (10-14-18 @ 22:09)  HR: 65 (10-15-18 @ 09:16) (65 - 72)  BP: 147/72 (10-15-18 @ 09:16) (147/72 - 173/50)  BP(mean): --  ABP: --  ABP(mean): --  RR: 16 (10-15-18 @ 09:16) (16 - 17)  SpO2: 93% (10-15-18 @ 09:16) (93% - 98%)  CVP(mm Hg): --  CVP(cm H2O): --    Ins and Outs     10-14-18 @ 07:01  -  10-15-18 @ 07:00  --------------------------------------------------------  IN: 660 mL / OUT: 1 mL / NET: 659 mL    10-15-18 @ 07:01  -  10-15-18 @ 14:33  --------------------------------------------------------  IN: 200 mL / OUT: 0 mL / NET: 200 mL      Physical Examination:  GENERAL:               Alert responsive, No acute distress.    HEENT:                  Pupils reactive, do not cross midline to left Symmetric. Moist oral mucosa  PULM:                     Bilateral air entry, No Rales, No Rhonchi, No Wheezing  CVS:                        S1S2 no murmurs appreciated   ABD:                        Soft, nondistended, nontender, normoactive bowel sounds,   EXT:                         No edema, nontender, No Cyanosis or Clubbing   Vascular:                 Warm Extremities  SKIN:                       Warm and well perfused  NEURO:                  Alert , interactive, left hemineglect, left upper and lower extremity strength 2/5 right upper extremity limited due to chronic shoulder arthritis per daughter

## 2018-10-15 NOTE — PROGRESS NOTE ADULT - ASSESSMENT
89 yo woman with h/o HTN and arthritis with acute right hemispheric MCA infarct with left hemiparesis, gait and ADL dysfunction, visual field deficits.     Recommend: Continue management per neurology and primary team. Continue to monitor telemetry-no reports of arrythmia.   Will await ST and PT evaluations. Will have to f/u with cardiology regarding prolonged hear monitoring and if AC will need to be started. Will re-evaluate patient after evaluations before making final recommendations for rehab services.         Recommend: Continue management per neurology, nephrology, cardiology, and primary team. Continue to monitor telemetry-no reports of arrythmia.  BPs ranging around 120-160s.   For pain in right shoulder-may try adding lidocaine patch daily and warm packs before/after patches applied.   Continue bedside therapies.   Spoke to daughter over phone. She would prefer for patient to be accepted to an acute IPR. At this time, there are no caregivers with the patient's  (who is mostly wc bound). 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 Daughter reports that patient and spouse have declined help in the home in the past. Will continue to encourage pt and daughter to start planning for care of the patient and spouse after patient has completed a course of rehab.   Will discuss care with rehab attending regarding best recommendations for rehab, acute vs subacute.

## 2018-10-15 NOTE — PROGRESS NOTE ADULT - SUBJECTIVE AND OBJECTIVE BOX
Awake, appetite fair    Vital Signs Last 24 Hrs  T(C): 36.7 (10-15-18 @ 15:11), Max: 37 (10-14-18 @ 22:09)  T(F): 98.1 (10-15-18 @ 15:11), Max: 98.6 (10-14-18 @ 22:09)  HR: 65 (10-15-18 @ 15:11) (65 - 72)  BP: 144/48 (10-15-18 @ 15:11) (144/48 - 173/50)  RR: 16 (10-15-18 @ 15:11) (16 - 17)  SpO2: 96% (10-15-18 @ 15:11) (93% - 98%)    Gen no distress  Lungs good air entry b/l  Abd soft, ND  Extr no edema  Skin no rash                                                                  10.0   7.0   )-----------( 185      ( 14 Oct 2018 05:55 )             31.7     15 Oct 2018 06:20    129    |  98     |  31     ----------------------------<  102    5.0     |  26     |  1.40     Ca    9.0        15 Oct 2018 06:20  Phos  4.7       14 Oct 2018 05:55  Mg     1.7       14 Oct 2018 05:55    TPro  6.9    /  Alb  2.7    /  TBili  0.2    /  DBili  0.1    /  AST  26     /  ALT  24     /  AlkPhos  54     14 Oct 2018 16:30    LIVER FUNCTIONS - ( 14 Oct 2018 16:30 )  Alb: 2.7 g/dL / Pro: 6.9 g/dL / ALK PHOS: 54 U/L / ALT: 24 U/L DA / AST: 26 U/L / GGT: x           acetaminophen   Tablet .. 650 milliGRAM(s) Oral every 6 hours PRN  aluminum hydroxide/magnesium hydroxide/simethicone Suspension 30 milliLiter(s) Oral every 6 hours PRN  aspirin  chewable 81 milliGRAM(s) Oral daily  cloNIDine 0.1 milliGRAM(s) Oral daily  docusate sodium 100 milliGRAM(s) Oral three times a day  heparin  Injectable 5000 Unit(s) SubCutaneous every 12 hours  labetalol 100 milliGRAM(s) Oral every 8 hours  pantoprazole    Tablet 40 milliGRAM(s) Oral daily  polyethylene glycol 3350 17 Gram(s) Oral two times a day  pravastatin 20 milliGRAM(s) Oral at bedtime  senna 2 Tablet(s) Oral at bedtime    A/P:    S/p CVA  CKD III at baseline  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

## 2018-10-15 NOTE — PROGRESS NOTE ADULT - ASSESSMENT
Metabolic encephelopathy ; Resolved  acute hyponatremia ? SIADH vs Cerebral Salt Wasting   acute hypoglycemia   Acute CVA with right paramedian parietal and right occipital lobes ischemia   HTN  Valvular heart disease s/p AVR      Trend sodium as becoming hyponatremic again, may need intervention if continues to drop   f/u nephrology recs    Cont. BP meds  Daughter refusing statin due to history of myopathy   Regular Diet  Cont. Aspirin   supportive care   PT eval   DVT prophylaxis

## 2018-10-15 NOTE — CHART NOTE - NSCHARTNOTEFT_GEN_A_CORE
NUTRITION FOLLOW UP    SOURCE: Patient's daughter/ Medical Record/ MD    DIET: Regular , Low potassium with 1500cc fluid restriction    Patient noted tolerating current diet, consuming 50% of meals as per flow sheet. No GI distress noted. Skin is intact , shin injury noted from PTA. (+)1 edema (L) arm noted.    CURRENT WEIGHT: 133.1/60.4kg , ? 3kg weight gain/change  since admission.    PERTINENT MEDS:   Pertinent Medications: MEDICATIONS  (STANDING):  aspirin  chewable 81 milliGRAM(s) Oral daily  cloNIDine 0.1 milliGRAM(s) Oral daily  docusate sodium 100 milliGRAM(s) Oral three times a day  heparin  Injectable 5000 Unit(s) SubCutaneous every 12 hours  labetalol 100 milliGRAM(s) Oral every 8 hours  pantoprazole    Tablet 40 milliGRAM(s) Oral daily  polyethylene glycol 3350 17 Gram(s) Oral two times a day  pravastatin 20 milliGRAM(s) Oral at bedtime  senna 2 Tablet(s) Oral at bedtime    MEDICATIONS  (PRN):  acetaminophen   Tablet .. 650 milliGRAM(s) Oral every 6 hours PRN Temp greater or equal to 38C (100.4F), Moderate Pain (4 - 6)      PERTINENT LABS:  Date: 14 Oct 2018 05:55  Hemoglobin 10.0(L)   Hematocrit 31.7 (L)    Date: 10-15  Sodium 129<L>  Potassium 5.0  Glucose Serum 102<H>  BUN 31<H>  Creatinine 1.40(H)    ESTIMATED NEEDS:   [X] no change since previous assessment  [ ] recalculated:     PREVIOUS NUTRITION DIAGNOSIS: addressed    NUTRITION DIAGNOSIS is   [X] resolved, diet advanced and patient tolerating well.      MONITORING AND EVALUATION: Labs noted, may consider fluid restriction due to hyponatremia. Will monitor labs weights, po intake      NUTRITION RECOMMENDATIONS: Fluid restriction of 1500cc/day added to current diet.

## 2018-10-15 NOTE — PROGRESS NOTE ADULT - ASSESSMENT
89 year old with PMH of HTN, AVR, CKD-3, bilateral cardiotomy admitted to [facility] with a new Right sided MCA ischemic stroke on 10/5/18.  During this admission patient developed SIADH and hyponatremia         Metabolic encephelopathy ; Resolved  acute hyponatremia ? SIADH vs Cerebral Salt Waisting improving  acute hypoglycemia unsure of etiology   Acute CVA with Left HP , not a candidate for tpa on presentation r/o cardio embolic as multiple locations on ct.  labile BP, in pt with underlying HTN  VHD s/p AVR  no known history of DM2 -Hemoglobin A1C, Whole Blood: 5.7 %        Plan   D/W Renal - Continue current care, can check Na q daily,      dtr wants q12   sBP control keep 140-180  bp meds changed with  labatolol, clonodine'  stagger meds as possible    Regular diet  asa, statin  check vitals q 4 hrs  1:1 observation to continue  supportive care     Family Updated: 	 Dtr bedside  Date: 10/13    Sedation & Analgesia:	on hold  Diet/Nutrition:		as tolerated, advance as per mental status  GI PPx:			PPI    DVT Ppx:		Heparin    Disposition: will transfer to [facility], case d/w Dr. Tolentino who will accept pt upon transfer.     Goals of Care: Full Code. 89 year old with PMH of HTN, AVR, CKD-3, bilateral cardiotomy admitted to [facility] with a new Right sided MCA ischemic stroke on 10/5/18.  During this admission patient developed SIADH and hyponatremia

## 2018-10-15 NOTE — PROGRESS NOTE ADULT - SUBJECTIVE AND OBJECTIVE BOX
· Subjective and Objective: 	  Patient is a 89y old  female who presented to [facility] on 10/5 with a chief complaint of left sided weakness since that preceding morning.  H/o hypertension, bilateral carotid endarterectomy and AVR replacement years ago.  From previous discussion with daughter, patient's BPs have been hard to control over the years and had been on BP medication at home but often her systolic BPs are in the 180-220s. In the ED, her SBP was 220. Out of window for tPA. Initial CT of head did not reveal any abnormalities however on repeat CT on the 6th, evolving acute/subacute infarcts within the high right paramedian parietal and right occipital lobes were noted. No hemorrhagic transformation seen. Neurology and cardiology were consulted. TTE and carotid dopplers without significant findings. No arrythmia found on telemetry monitoring. SBPs have continued to trend around 180 systolically. Catapress patch was applied on 10/6.  PT was unable to see patient due to SBP in the 180s. OT saw patient and found that patient was mod to max assist for bed mobility and transfers.   The evening of the 8th, patient was transferred to ICU for hyponatremia, mental status change, and hypoglycemia with a blood sugar in the 30s. Patient is being followed by cardiology. SBPs are still labile. Has not had any episodes of arrythmia noted on telemetry monitoring Considering monitoring as an outpatient.   Neurology continues to follow along. Monitoring metabolic encephalopathy 2/2 hyponatremia and residual deficits from CVA. Nephrology managing hyponatremia.  With physical therapy, patient is mod A x 2 for bed mobility and transfers today on 10/12. Speech therapy cleared patient for soft solids with thin liquids.  PMR re-consulted for weigh in on disposition planning.     PAST MEDICAL & SURGICAL HISTORY:  Hypertension, unspecified type  History of total hip replacement, right: 2014 at [facility],  No complications  S/p bilateral carotid endarterectomy: 2008 st [facility], no complications  S/P AVR: 2008 at [facility], Dr. Bermudez.  No complications  Right shoulder arthritis  Left radial nerve palsy after fall in 2017  Left carpel tunnel   macular degeneration       Allergies    ACE inhibitors (Other)  hydrALAZINE (Other)    MEDICATIONS  (STANDING):  amLODIPine   Tablet 5 milliGRAM(s) Oral daily  aspirin  chewable 81 milliGRAM(s) Oral daily  atorvastatin 40 milliGRAM(s) Oral at bedtime  cloNIDine 0.1 milliGRAM(s) Oral every 8 hours  docusate sodium 100 milliGRAM(s) Oral three times a day  heparin  Injectable 5000 Unit(s) SubCutaneous every 12 hours  labetalol 100 milliGRAM(s) Oral every 8 hours  pantoprazole    Tablet 40 milliGRAM(s) Oral daily  polyethylene glycol 3350 17 Gram(s) Oral two times a day  predniSONE   Tablet 10 milliGRAM(s) Oral every 12 hours  senna 2 Tablet(s) Oral at bedtime    MEDICATIONS  (PRN):  acetaminophen   Tablet .. 650 milliGRAM(s) Oral every 6 hours PRN Temp greater or equal to 38C (100.4F), Moderate Pain (4 - 6)      Social Hx: Lives in  with disabled spouse. Spouse has SCI and in mostly WC bound. Pt will bring spouse clothes and meals, but he is able to bath and dress himself. Patient was independent for all of her ADLs prior to stroke. Pt used a cane for balance. No longer drives due to macular degeneration.     TODAY'S SUBJECTIVE & REVIEW OF SYMPTOMS:  [ x  ] Constitutional WNL  [   ] Cardio WNL  [ x  ] Resp WNL  [ x  ] GI WNL  [ x  ] Heme WNL  [ x  ] Endo WNL  [ x  ] Skin WNL  [   ] MSK WNL  [   ] Neuro WNL  [ x  ] Cognitive WNL  [ x  ] Psych WNL  Patient denies CP, SOB, HA, dizziness, N/V/D, abdominal pain, fever, chills.     Vital Signs Last 24 Hrs  T(C): 36.8 (15 Oct 2018 09:16), Max: 37 (14 Oct 2018 22:09)  T(F): 98.2 (15 Oct 2018 09:16), Max: 98.6 (14 Oct 2018 22:09)  HR: 65 (15 Oct 2018 09:16) (64 - 72)  BP: 147/72 (15 Oct 2018 09:16) (147/72 - 173/50)  BP(mean): 70 (14 Oct 2018 14:00) (70 - 70)  RR: 16 (15 Oct 2018 09:16) (16 - 21)  SpO2: 93% (15 Oct 2018 09:16) (93% - 98%)    SBPs are trending 130s-160s, one outliner in the 170s                                             10.0   7.0   )-----------( 185      ( 14 Oct 2018 05:55 )             31.7   10-15    129<L>  |  98  |  31<H>  ----------------------------<  102<H>  5.0   |  26  |  1.40<H>    Ca    9.0      15 Oct 2018 06:20  Phos  4.7     10-14  Mg     1.7     10-14    TPro  6.9  /  Alb  2.7<L>  /  TBili  0.2  /  DBili  0.1  /  AST  26  /  ALT  24  /  AlkPhos  54  10-14      < from: CT Head No Cont (10.06.18 @ 11:04) >  EXAM:  CT BRAIN      PROCEDURE DATE:  10/06/2018        INTERPRETATION:  .    CLINICAL INFORMATION: Cerebrovascular accident.    TECHNIQUE: Multiple axial CT images of the head were obtained without   contrast. Sagittal and coronal reconstructed images were acquired from   the source data.    COMPARISON: Prior head CT examination from 10/5/2018.    FINDINGS: An evolving transcortical infarct is notable within the high   right paramedian parietal lobe. Evolving infarct is also notable within   the right occipital lobe.    There is a chronic infarct within the right posterior frontal lobe. A   chronic infarct is also noted within the left cerebellar hemisphere.   There is also an area of encephalomalacia and gliosis within the right   basal frontal lobe related to prior insult.    There is diffuse cerebral volume loss with prominence of the sulci,   fissures, and cisternal spaces which is normal for the patient's age.   There is mild periventricular white matter hypoattenuation statistically   compatible with microvascular changes given calcific atherosclerotic   disease of the intracranial arteries.    There is no acute intracranial hemorrhage, shift of the midline   structures, or hydrocephalus.    The paranasal sinuses and mastoid air cells are clear. The calvarium   appears intact. Bilateral senile scleral plaques are noted. There is   evidence of bilateral cataract removal.     IMPRESSION: Evolving acute/subacute infarcts within the high right   paramedian parietal and right occipital lobes. No hemorrhagic   transformation.    Other chronic findings, as discussed      < end of copied text >        On Neurological Examination:  Mental Status - Patient is sleepy but alert with verbal stimuli, awake, oriented X3. Speech is fluent, able to  name and repeat. Impaired attention and concentration.  Follows commands well and able to answer questions appropriately. Mood and affect  normal.   Cranial Nerves - left facial asymmetry, no dysarthria, left field cut, left sided neglect evident-difficulty attending to the left side (had been able to pass midline in exam on friday). D  Motor Exam -   Right upper diminished AROM secondary to pain, 3/5 shoulder strength, 4/5 elbow strengths, 5/5 hand and wrist strengths.   Left upper increased tone to left upper extremity, able to extend elbow with gravity elimiated- synergistic movements   Right lower 4+/5 hip flexor, 5/5 knee and DF/PF  Left lower 3/5 hip flexor, 4/5 knee and DF/PF  Sensory    Impaired to light touch left side/extinction to touch  No tremors  DTS Reflexes: 3+ left side  Toes are flexor     GENERAL Exam:     Nontoxic , No Acute Distress   HEENT:  normocephalic, atraumatic		  LUNGS:	Clear bilaterally    HEART:	 Normal S1S2      GI/ ABDOMEN:  Soft  Non tender +BS  SKIN: Dressing intact to left lower extremity

## 2018-10-15 NOTE — PROGRESS NOTE ADULT - SUBJECTIVE AND OBJECTIVE BOX
Patient is a 89y old  Female who presents with a chief complaint of Weakness (15 Oct 2018 13:10)  Patient admitted with CVA, has SIADH with hyponatremia.      Patient seen and examined at bedside.  Patient denies having any chest pain or shortness of breath.  Reports that appetite is good and no abdominal pain.    ALLERGIES:  ACE inhibitors (Other)  hydrALAZINE (Other)    MEDICATIONS:  acetaminophen   Tablet .. 650 milliGRAM(s) Oral every 6 hours PRN  aspirin  chewable 81 milliGRAM(s) Oral daily  atorvastatin 10 milliGRAM(s) Oral at bedtime  cloNIDine 0.1 milliGRAM(s) Oral daily  docusate sodium 100 milliGRAM(s) Oral three times a day  heparin  Injectable 5000 Unit(s) SubCutaneous every 12 hours  labetalol 100 milliGRAM(s) Oral every 8 hours  pantoprazole    Tablet 40 milliGRAM(s) Oral daily  polyethylene glycol 3350 17 Gram(s) Oral two times a day  senna 2 Tablet(s) Oral at bedtime    Vital Signs Last 24 Hrs  T(F): 98.2 (15 Oct 2018 09:16), Max: 98.6 (14 Oct 2018 22:09)  HR: 65 (15 Oct 2018 09:16) (65 - 72)  BP: 147/72 (15 Oct 2018 09:16) (147/72 - 173/50)  RR: 16 (15 Oct 2018 09:16) (16 - 17)  SpO2: 93% (15 Oct 2018 09:16) (93% - 98%)  I&O's Summary    14 Oct 2018 07:01  -  15 Oct 2018 07:00  --------------------------------------------------------  IN: 660 mL / OUT: 1 mL / NET: 659 mL    15 Oct 2018 07:01  -  15 Oct 2018 14:27  --------------------------------------------------------  IN: 200 mL / OUT: 0 mL / NET: 200 mL        PHYSICAL EXAM:  General: NAD, A/O x 3  ENT: MMM  Neck: Supple, No JVD  Lungs: Clear to auscultation bilaterally  Cardio: RRR, S1/S2, No murmurs  Abdomen: Soft, Nontender, Nondistended; Bowel sounds present  Extremities: No cyanosis, No edema    LABS:                        10.0   7.0   )-----------( 185      ( 14 Oct 2018 05:55 )             31.7     10-15    129  |  98  |  31  ----------------------------<  102  5.0   |  26  |  1.40    Ca    9.0      15 Oct 2018 06:20  Phos  4.7     10-14  Mg     1.7     10-14    TPro  6.9  /  Alb  2.7  /  TBili  0.2  /  DBili  0.1  /  AST  26  /  ALT  24  /  AlkPhos  54  10-14    eGFR if Non African American: 32 mL/min/1.73M2 (10-15-18 @ 06:20)  eGFR if African American: 37 mL/min/1.73M2 (10-15-18 @ 06:20)        CARDIAC MARKERS ( 14 Oct 2018 16:30 )  x     / x     / 39 U/L / x     / x      CARDIAC MARKERS ( 14 Oct 2018 05:55 )  x     / x     / 33 U/L / x     / x          10-06 Chol 185 mg/dL LDL 77 mg/dL HDL 92 mg/dL Trig 81 mg/dL        CAPILLARY BLOOD GLUCOSE        10-06 [id number] 5.7          RADIOLOGY & ADDITIONAL TESTS:    Care Discussed with Consultants/Other Providers: Patient is a 89y old  Female who presents with a chief complaint of Weakness (15 Oct 2018 13:10)  Patient admitted with CVA, has SIADH with hyponatremia.      Patient seen and examined at bedside.  Patient denies having any chest pain or shortness of breath.  Reports that appetite is good and no abdominal pain.    ALLERGIES:  ACE inhibitors (Other)  hydrALAZINE (Other)    MEDICATIONS:  acetaminophen   Tablet .. 650 milliGRAM(s) Oral every 6 hours PRN  aspirin  chewable 81 milliGRAM(s) Oral daily  atorvastatin 10 milliGRAM(s) Oral at bedtime  cloNIDine 0.1 milliGRAM(s) Oral daily  docusate sodium 100 milliGRAM(s) Oral three times a day  heparin  Injectable 5000 Unit(s) SubCutaneous every 12 hours  labetalol 100 milliGRAM(s) Oral every 8 hours  pantoprazole    Tablet 40 milliGRAM(s) Oral daily  polyethylene glycol 3350 17 Gram(s) Oral two times a day  senna 2 Tablet(s) Oral at bedtime    Vital Signs Last 24 Hrs  T(F): 98.2 (15 Oct 2018 09:16), Max: 98.6 (14 Oct 2018 22:09)  HR: 65 (15 Oct 2018 09:16) (65 - 72)  BP: 147/72 (15 Oct 2018 09:16) (147/72 - 173/50)  RR: 16 (15 Oct 2018 09:16) (16 - 17)  SpO2: 93% (15 Oct 2018 09:16) (93% - 98%)  I&O's Summary    14 Oct 2018 07:01  -  15 Oct 2018 07:00  --------------------------------------------------------  IN: 660 mL / OUT: 1 mL / NET: 659 mL    15 Oct 2018 07:01  -  15 Oct 2018 14:27  --------------------------------------------------------  IN: 200 mL / OUT: 0 mL / NET: 200 mL        PHYSICAL EXAM:  General: NAD, A/O x 2  ENT: MMM  Neck: Supple, No JVD  Lungs: decreased breath sounds at bases  Cardio: RRR, S1/S2, systolic murmur  Abdomen: Soft, Nontender, Nondistended; Bowel sounds present  Extremities: right sided weakness    LABS:                        10.0   7.0   )-----------( 185      ( 14 Oct 2018 05:55 )             31.7     10-15    129  |  98  |  31  ----------------------------<  102  5.0   |  26  |  1.40    Ca    9.0      15 Oct 2018 06:20  Phos  4.7     10-14  Mg     1.7     10-14    TPro  6.9  /  Alb  2.7  /  TBili  0.2  /  DBili  0.1  /  AST  26  /  ALT  24  /  AlkPhos  54  10-14    eGFR if Non African American: 32 mL/min/1.73M2 (10-15-18 @ 06:20)  eGFR if African American: 37 mL/min/1.73M2 (10-15-18 @ 06:20)        CARDIAC MARKERS ( 14 Oct 2018 16:30 )  x     / x     / 39 U/L / x     / x      CARDIAC MARKERS ( 14 Oct 2018 05:55 )  x     / x     / 33 U/L / x     / x          10-06 Chol 185 mg/dL LDL 77 mg/dL HDL 92 mg/dL Trig 81 mg/dL        CAPILLARY BLOOD GLUCOSE        10-06 [id number] 5.7          RADIOLOGY & ADDITIONAL TESTS:    Care Discussed with Consultants/Other Providers:

## 2018-10-15 NOTE — PROGRESS NOTE ADULT - ASSESSMENT
Imp    Elderly woman with long hx of difficult to control htn according to her daughter    No evidence of AF on monitor

## 2018-10-16 LAB
ANION GAP SERPL CALC-SCNC: 6 MMOL/L — SIGNIFICANT CHANGE UP (ref 5–17)
BUN SERPL-MCNC: 31 MG/DL — HIGH (ref 7–23)
CALCIUM SERPL-MCNC: 9.3 MG/DL — SIGNIFICANT CHANGE UP (ref 8.4–10.5)
CHLORIDE SERPL-SCNC: 103 MMOL/L — SIGNIFICANT CHANGE UP (ref 96–108)
CO2 SERPL-SCNC: 25 MMOL/L — SIGNIFICANT CHANGE UP (ref 22–31)
CREAT SERPL-MCNC: 1.39 MG/DL — HIGH (ref 0.5–1.3)
GLUCOSE SERPL-MCNC: 99 MG/DL — SIGNIFICANT CHANGE UP (ref 70–99)
POTASSIUM SERPL-MCNC: 4.9 MMOL/L — SIGNIFICANT CHANGE UP (ref 3.5–5.3)
POTASSIUM SERPL-SCNC: 4.9 MMOL/L — SIGNIFICANT CHANGE UP (ref 3.5–5.3)
SODIUM SERPL-SCNC: 134 MMOL/L — LOW (ref 135–145)

## 2018-10-16 PROCEDURE — 99223 1ST HOSP IP/OBS HIGH 75: CPT

## 2018-10-16 PROCEDURE — 90792 PSYCH DIAG EVAL W/MED SRVCS: CPT

## 2018-10-16 PROCEDURE — 99231 SBSQ HOSP IP/OBS SF/LOW 25: CPT

## 2018-10-16 PROCEDURE — 99233 SBSQ HOSP IP/OBS HIGH 50: CPT

## 2018-10-16 RX ORDER — HYDROMORPHONE HYDROCHLORIDE 2 MG/ML
1 INJECTION INTRAMUSCULAR; INTRAVENOUS; SUBCUTANEOUS EVERY 4 HOURS
Qty: 0 | Refills: 0 | Status: DISCONTINUED | OUTPATIENT
Start: 2018-10-16 | End: 2018-10-18

## 2018-10-16 RX ORDER — LABETALOL HCL 100 MG
100 TABLET ORAL EVERY 12 HOURS
Qty: 0 | Refills: 0 | Status: DISCONTINUED | OUTPATIENT
Start: 2018-10-16 | End: 2018-10-17

## 2018-10-16 RX ADMIN — HYDROMORPHONE HYDROCHLORIDE 1 MILLIGRAM(S): 2 INJECTION INTRAMUSCULAR; INTRAVENOUS; SUBCUTANEOUS at 21:15

## 2018-10-16 RX ADMIN — PANTOPRAZOLE SODIUM 40 MILLIGRAM(S): 20 TABLET, DELAYED RELEASE ORAL at 12:21

## 2018-10-16 RX ADMIN — Medication 650 MILLIGRAM(S): at 06:53

## 2018-10-16 RX ADMIN — HEPARIN SODIUM 5000 UNIT(S): 5000 INJECTION INTRAVENOUS; SUBCUTANEOUS at 05:52

## 2018-10-16 RX ADMIN — Medication 0.1 MILLIGRAM(S): at 05:52

## 2018-10-16 RX ADMIN — Medication 81 MILLIGRAM(S): at 12:21

## 2018-10-16 RX ADMIN — Medication 650 MILLIGRAM(S): at 05:53

## 2018-10-16 RX ADMIN — HYDROMORPHONE HYDROCHLORIDE 1 MILLIGRAM(S): 2 INJECTION INTRAMUSCULAR; INTRAVENOUS; SUBCUTANEOUS at 21:55

## 2018-10-16 RX ADMIN — SENNA PLUS 2 TABLET(S): 8.6 TABLET ORAL at 21:15

## 2018-10-16 RX ADMIN — Medication 100 MILLIGRAM(S): at 05:51

## 2018-10-16 RX ADMIN — HEPARIN SODIUM 5000 UNIT(S): 5000 INJECTION INTRAVENOUS; SUBCUTANEOUS at 17:31

## 2018-10-16 RX ADMIN — Medication 100 MILLIGRAM(S): at 21:15

## 2018-10-16 RX ADMIN — Medication 100 MILLIGRAM(S): at 17:30

## 2018-10-16 NOTE — PROGRESS NOTE ADULT - SUBJECTIVE AND OBJECTIVE BOX
Awake, appetite fair    Vital Signs Last 24 Hrs  T(C): 36.6 (10-16-18 @ 16:23), Max: 37.2 (10-16-18 @ 01:53)  T(F): 97.8 (10-16-18 @ 16:23), Max: 98.9 (10-16-18 @ 01:53)  HR: 62 (10-16-18 @ 16:23) (62 - 70)  BP: 150/45 (10-16-18 @ 16:23) (137/42 - 194/76)  RR: 16 (10-16-18 @ 16:23) (15 - 16)  SpO2: 97% (10-16-18 @ 16:23) (94% - 97%)    Gen no distress  Lungs good air entry b/l  Abd soft, ND  Extr no edema  Skin no rash                                                       16 Oct 2018 05:50    134    |  103    |  31     ----------------------------<  99     4.9     |  25     |  1.39     Ca    9.3        16 Oct 2018 05:50    acetaminophen   Tablet .. 650 milliGRAM(s) Oral every 6 hours PRN  aluminum hydroxide/magnesium hydroxide/simethicone Suspension 30 milliLiter(s) Oral every 6 hours PRN  aspirin  chewable 81 milliGRAM(s) Oral daily  cloNIDine 0.1 milliGRAM(s) Oral daily  docusate sodium 100 milliGRAM(s) Oral three times a day  heparin  Injectable 5000 Unit(s) SubCutaneous every 12 hours  HYDROmorphone   Tablet 1 milliGRAM(s) Oral every 4 hours PRN  labetalol 100 milliGRAM(s) Oral every 8 hours  pantoprazole    Tablet 40 milliGRAM(s) Oral daily  polyethylene glycol 3350 17 Gram(s) Oral two times a day  pravastatin 20 milliGRAM(s) Oral at bedtime  senna 2 Tablet(s) Oral at bedtime    A/P:    S/p CVA  CKD III at baseline  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

## 2018-10-16 NOTE — CONSULT NOTE ADULT - ASSESSMENT
Ax)    1) Frozen right shoulder secondary to severe osteoarthritis causing significant pain  2) Mild intermittent sciatica, left side.  3) Right CVA and left arm paresis.  4) Severe HTN - resolved  5) S/P allergic (muscular pain) reaction to Zocor - resolved  5) Delirium - secondary to large dose of dilaudid, hypoglycemia, hyponatremia - all resolved  6) Possible adrenal insufficiency.  7) Cardiac ailments as noted.      Px), From the standpoint of pain management,    1) Patient has significant pain from her right shoulder that needs to be treated. Daughter and patient give especially good and logical pain histories.  2) Clearly the NSAIDS have not been effective with this patient. Even if ibuprofen is used on the basis that the patient reported some limited effect, it would have to be used for months if not  years causing the potential of intolerable toxicity to the kidneys.  3) While the patient could theoretically tolerate Nucynta even though she could not tolerate the Tramadol, this is not likely and Nucynta is a brand new drug and using it in such an elderly pt who has shown great sensitivity to medications already is not a preferred option.  4) Hydromorphone (dilaudid) is not renal excreted, short half life, potential for buildup in body limited at small doses, and she did tolerate it in the past and proved effective. Note that the reason that the hydromorphone made her super lethargic in the ICU - aside from the fact that she also had hyponatremia and hypoglycemia, the dose of 0.5 mg IV dilaudid was eq. to 9 mg of po morphine which is twice what a opioid naive patient should be started on (5 mg). On the other hand 1 mg of dilaudid is equivalent to 4 mg of po morphine which is just exactly right. Thus in terms of side effects, and safety, knowing that patient does in fact need something for her pain in her right shoulder, 1 mg dilaudid po q4h prn pain should be the best option and this can be given safely for the rest of the patient's life if need be and should not effect her cognition.  5) Also recommended was moist heat compresses q4 hr, and when pt is discharged as the dtr, a PA, has a TENS unit, use that 4 times a day under a moist heat micro-wavable compress which the patient also has at home (maybe the patient would be allowed to bring in the compress from home).  6) For the sciatica, position change is all that is needed at this time as well as what the dtr. suggested, a lidocaine patch to the left lateral lumber area.     All of the above were extensively explained to the patient and her dtr and they are in full agreement with this plan. Discussed with Dr. Napier.

## 2018-10-16 NOTE — PROGRESS NOTE ADULT - ASSESSMENT
Metabolic encephelopathy ; Resolved  acute hyponatremia ? SIADH vs Cerebral Salt Wasting   acute hypoglycemia   Acute CVA with right paramedian parietal and right occipital lobes ischemia   HTN  Valvular heart disease s/p AVR      S/p dose of tolvaptan yesterday, sodium improved today  f/u nephrology recs    Cont. BP meds  Daughter refusing statin due to history of myopathy   Regular Diet  Cont. Aspirin   supportive care   PT eval   DVT prophylaxis

## 2018-10-16 NOTE — CONSULT NOTE ADULT - ASSESSMENT
Discussed symptomatology with Dr. Keenan present at the bedside for an evaluation regarding pain.  He felt patient gave a coherent history and physical findings concurred with chief complaint.  Daughter observed with her mother and while she has a tendency to answer questions for her mother, was able to step out of the room while writer interviewed her mother (the patient) without her present.  There does seem to be family history which in the patient's assessment the hospital in question did something "wrong", and loved ones' , eg, father -got X ray treatment for /asbestosis cancer, and patient described his death by hemoptysis. Son as already mentioned in HPI, and mother with a surgical procedure "gone wrong". This may be contributing to family dynamic of mistrust of hospital treatment/care given.  Pt herself denied depression, writer discussed deferring SSRI/SNRI for now until sodium balance maintained, or symptoms reach threshold for treatment.  Advice to the team- 1) refrain from investigative, invasive treatments unless justified by measurable data.  2) If 1:1 observation d/c, careful documentation of physical/lab findings so there is accurate comparison between/within illness episodes. 3). Inclusion of family in terms of explaining plan of care- with written instructions preferable to verbal ones, patient however has capacity to make medical decisions for herself.

## 2018-10-16 NOTE — PROGRESS NOTE ADULT - ASSESSMENT
89 year old with PMH of HTN, AVR, CKD-3, bilateral cardiotomy admitted to [facility] with a new Right sided MCA ischemic stroke on 10/5/18.  During this admission patient developed SIADH and hyponatremia 89 year old with PMH of HTN, AVR, CKD-3, bilateral cardiotomy admitted to [facility] with a new Right sided MCA ischemic stroke on 10/5/18.  During this admission patient developed SIADH and hyponatremia     One-to-one cancelled after risk benefit analysis by hospital administration

## 2018-10-16 NOTE — PROGRESS NOTE ADULT - SUBJECTIVE AND OBJECTIVE BOX
Patient is a 89y old  Female who presents with a chief complaint of Weakness (16 Oct 2018 12:29)      Patient seen and examined at bedside.  Patient denies having chest pain or shortness of breath.  Reports right shoulder pain.      ALLERGIES:  ACE inhibitors (Other)  hydrALAZINE (Other)    MEDICATIONS:  acetaminophen   Tablet .. 650 milliGRAM(s) Oral every 6 hours PRN  aluminum hydroxide/magnesium hydroxide/simethicone Suspension 30 milliLiter(s) Oral every 6 hours PRN  aspirin  chewable 81 milliGRAM(s) Oral daily  cloNIDine 0.1 milliGRAM(s) Oral daily  docusate sodium 100 milliGRAM(s) Oral three times a day  heparin  Injectable 5000 Unit(s) SubCutaneous every 12 hours  labetalol 100 milliGRAM(s) Oral every 8 hours  pantoprazole    Tablet 40 milliGRAM(s) Oral daily  polyethylene glycol 3350 17 Gram(s) Oral two times a day  pravastatin 20 milliGRAM(s) Oral at bedtime  senna 2 Tablet(s) Oral at bedtime    Vital Signs Last 24 Hrs  T(F): 97.4 (16 Oct 2018 08:42), Max: 98.9 (16 Oct 2018 01:53)  HR: 67 (16 Oct 2018 08:42) (65 - 70)  BP: 137/42 (16 Oct 2018 12:29) (137/42 - 194/76)  RR: 15 (16 Oct 2018 12:29) (15 - 16)  SpO2: 95% (16 Oct 2018 12:29) (94% - 97%)  I&O's Summary    15 Oct 2018 07:01  -  16 Oct 2018 07:00  --------------------------------------------------------  IN: 750 mL / OUT: 250 mL / NET: 500 mL    16 Oct 2018 07:01  -  16 Oct 2018 14:17  --------------------------------------------------------  IN: 400 mL / OUT: 0 mL / NET: 400 mL        PHYSICAL EXAM:  General: NAD, A/O x 3  ENT: MMM  Neck: Supple, No JVD  Lungs: Clear to auscultation bilaterally  Cardio: RRR, S1/S2, No murmurs  Abdomen: Soft, Nontender, Nondistended; Bowel sounds present  Extremities: No cyanosis, No edema    LABS:                        10.0   7.0   )-----------( 185      ( 14 Oct 2018 05:55 )             31.7     10-16    134  |  103  |  31  ----------------------------<  99  4.9   |  25  |  1.39    Ca    9.3      16 Oct 2018 05:50  Phos  4.7     10-14  Mg     1.7     10-14    TPro  6.9  /  Alb  2.7  /  TBili  0.2  /  DBili  0.1  /  AST  26  /  ALT  24  /  AlkPhos  54  10-14    eGFR if Non African American: 33 mL/min/1.73M2 (10-16-18 @ 05:50)  eGFR if African American: 38 mL/min/1.73M2 (10-16-18 @ 05:50)        CARDIAC MARKERS ( 14 Oct 2018 16:30 )  x     / x     / 39 U/L / x     / x      CARDIAC MARKERS ( 14 Oct 2018 05:55 )  x     / x     / 33 U/L / x     / x          10-06 Chol 185 mg/dL LDL 77 mg/dL HDL 92 mg/dL Trig 81 mg/dL        RADIOLOGY & ADDITIONAL TESTS:    Care Discussed with Consultants/Other Providers: Dr. Bhakta, Dr. Medina, Dr. Ayala Patient is a 89y old  Female who presents with a chief complaint of Weakness (16 Oct 2018 12:29)      Patient seen and examined at bedside.  Patient denies having chest pain or shortness of breath.  Reports right shoulder pain.      ALLERGIES:  ACE inhibitors (Other)  hydrALAZINE (Other)    MEDICATIONS:  acetaminophen   Tablet .. 650 milliGRAM(s) Oral every 6 hours PRN  aluminum hydroxide/magnesium hydroxide/simethicone Suspension 30 milliLiter(s) Oral every 6 hours PRN  aspirin  chewable 81 milliGRAM(s) Oral daily  cloNIDine 0.1 milliGRAM(s) Oral daily  docusate sodium 100 milliGRAM(s) Oral three times a day  heparin  Injectable 5000 Unit(s) SubCutaneous every 12 hours  labetalol 100 milliGRAM(s) Oral every 8 hours  pantoprazole    Tablet 40 milliGRAM(s) Oral daily  polyethylene glycol 3350 17 Gram(s) Oral two times a day  pravastatin 20 milliGRAM(s) Oral at bedtime  senna 2 Tablet(s) Oral at bedtime    Vital Signs Last 24 Hrs  T(F): 97.4 (16 Oct 2018 08:42), Max: 98.9 (16 Oct 2018 01:53)  HR: 67 (16 Oct 2018 08:42) (65 - 70)  BP: 137/42 (16 Oct 2018 12:29) (137/42 - 194/76)  RR: 15 (16 Oct 2018 12:29) (15 - 16)  SpO2: 95% (16 Oct 2018 12:29) (94% - 97%)  I&O's Summary    15 Oct 2018 07:01  -  16 Oct 2018 07:00  --------------------------------------------------------  IN: 750 mL / OUT: 250 mL / NET: 500 mL    16 Oct 2018 07:01  -  16 Oct 2018 14:17  --------------------------------------------------------  IN: 400 mL / OUT: 0 mL / NET: 400 mL        PHYSICAL EXAM:  General: NAD, A/O x 3  ENT: MMM  Neck: Supple, No JVD  Lungs: Clear to auscultation bilaterally  Cardio: RRR, S1/S2,  Abdomen: Soft, Nontender, Nondistended; Bowel sounds present  Extremities: No cyanosis, No edema, pain on palpation or right shoulder    LABS:                        10.0   7.0   )-----------( 185      ( 14 Oct 2018 05:55 )             31.7     10-16    134  |  103  |  31  ----------------------------<  99  4.9   |  25  |  1.39    Ca    9.3      16 Oct 2018 05:50  Phos  4.7     10-14  Mg     1.7     10-14    TPro  6.9  /  Alb  2.7  /  TBili  0.2  /  DBili  0.1  /  AST  26  /  ALT  24  /  AlkPhos  54  10-14    eGFR if Non African American: 33 mL/min/1.73M2 (10-16-18 @ 05:50)  eGFR if African American: 38 mL/min/1.73M2 (10-16-18 @ 05:50)        CARDIAC MARKERS ( 14 Oct 2018 16:30 )  x     / x     / 39 U/L / x     / x      CARDIAC MARKERS ( 14 Oct 2018 05:55 )  x     / x     / 33 U/L / x     / x          10-06 Chol 185 mg/dL LDL 77 mg/dL HDL 92 mg/dL Trig 81 mg/dL        RADIOLOGY & ADDITIONAL TESTS:    Care Discussed with Consultants/Other Providers: Dr. Bhakta, Dr. Medina, Dr. Ayala

## 2018-10-16 NOTE — PROGRESS NOTE ADULT - SUBJECTIVE AND OBJECTIVE BOX
Follow-up Pulmonary Progress Note  Chief Complaint : Other symptom or sign involving musculoskeletal system    Seen with daughter at bedside this morning. Awake and following commands. Does not appear distressed. Tolerating oral diet. S/p one dose tolvaptan yesterday.    Allergies :ACE inhibitors (Other)  hydrALAZINE (Other)    PAST MEDICAL & SURGICAL HISTORY:  Hypertension, unspecified type  History of total hip replacement, right: 2014 at [facility],  No complications  S/p bilateral carotid endarterectomy: 2008 [facility], no complications  S/P AVR: 2008 at [facility], Dr. Bermudez.  No complications    Medications:  MEDICATIONS  (STANDING):  aspirin  chewable 81 milliGRAM(s) Oral daily  cloNIDine 0.1 milliGRAM(s) Oral daily  docusate sodium 100 milliGRAM(s) Oral three times a day  heparin  Injectable 5000 Unit(s) SubCutaneous every 12 hours  labetalol 100 milliGRAM(s) Oral every 8 hours  pantoprazole    Tablet 40 milliGRAM(s) Oral daily  polyethylene glycol 3350 17 Gram(s) Oral two times a day  pravastatin 20 milliGRAM(s) Oral at bedtime  senna 2 Tablet(s) Oral at bedtime    MEDICATIONS  (PRN):  acetaminophen   Tablet .. 650 milliGRAM(s) Oral every 6 hours PRN Temp greater or equal to 38C (100.4F), Moderate Pain (4 - 6)  aluminum hydroxide/magnesium hydroxide/simethicone Suspension 30 milliLiter(s) Oral every 6 hours PRN Dyspepsia      LABS:    10-16    134<L>  |  103  |  31<H>  ----------------------------<  99  4.9   |  25  |  1.39<H>    Ca    9.3      16 Oct 2018 05:50    TPro  6.9  /  Alb  2.7<L>  /  TBili  0.2  /  DBili  0.1  /  AST  26  /  ALT  24  /  AlkPhos  54  10-14      CARDIAC MARKERS ( 14 Oct 2018 16:30 )  x     / x     / 39 U/L / x     / x          VITALS:  T(C): 36.3 (10-16-18 @ 08:42), Max: 37.2 (10-16-18 @ 01:53)  T(F): 97.4 (10-16-18 @ 08:42), Max: 98.9 (10-16-18 @ 01:53)  HR: 67 (10-16-18 @ 08:42) (65 - 70)  BP: 149/45 (10-16-18 @ 08:42) (144/48 - 194/76)  BP(mean): --  ABP: --  ABP(mean): --  RR: 15 (10-16-18 @ 08:42) (15 - 16)  SpO2: 97% (10-16-18 @ 08:42) (94% - 97%)  CVP(mm Hg): --  CVP(cm H2O): --    Ins and Outs     10-15-18 @ 07:01  -  10-16-18 @ 07:00  --------------------------------------------------------  IN: 750 mL / OUT: 250 mL / NET: 500 mL    10-16-18 @ 07:01  -  10-16-18 @ 11:17  --------------------------------------------------------  IN: 200 mL / OUT: 0 mL / NET: 200 mL    Physical Examination:  GENERAL:               Awake and alert, following commands, No acute distress.    HEENT:                   Pupils reactive, do not cross midline to left Symmetric. Moist oral mucosa  PULM:                     Bilateral air entry, No Rales, No Rhonchi, No Wheezing  CVS:                        S1S2 no murmurs appreciated   ABD:                        Soft, nondistended, nontender, normoactive bowel sounds,   EXT:                         No edema, nontender, No Cyanosis or Clubbing   Vascular:                 Warm Extremities  SKIN:                       Warm and well perfused  NEURO:                  Alert , interactive, left hemineglect, left upper and lower extremity strength 2/5 right upper extremity limited due to chronic shoulder arthritis per daughter

## 2018-10-16 NOTE — CONSULT NOTE ADULT - SUBJECTIVE AND OBJECTIVE BOX
Source: Patient, patient's daughter & chart    Reliability: Good    Identifying Data: 89yo  elderly female with HEALTH ISSUES - PROBLEM Dx:  Weakness of left arm, RPLS (reversible posterior leukoencephalopathy syndrome), PRES (posterior reversible encephalopathy syndrome),Encephalopathy, Aortic valve prosthesis presentPrediabetes: Prediabetes,Macrocytic anemia, DANIELLA (acute kidney injury),Hyponatremia,Essential hypertension, Acute CVA (cerebrovascular accident),Hypertension, unspecified type, CVA (cerebral vascular accident)    Chief Complaint: "My son  in this hospital."    History of Present Illness:  89 year old female with PMH HTN and AVR presented to ED at [facility] today via EMS.  Per , patient developed left arm weakness and left facial droop at about 9am yesterday, at that time they did not seek medical intervention.  This morning patient was unsteady in bathroom and  assisted her to the floor.  Called EMS to help with transfer out of bathroom, noted to be hypertensive by EMS and brought to ED against husbands wishes.  In ED SBP's found to be greater than 200.  Denies fever, chills, chest pain, shortness of breath, abdominal pain, lightheadedness, dizziness, headache, visual changes, tremor, syncope. (05 Oct 2018 13:31)    Symptoms and concerns-met with patient & Dr Robert Lind, daughter at bedside initially and then met with patient alone for interview.  Patient reports "worries" over her health and her finances as well as concern for her elderly 's health with whom she lives. Reports concern over her ability to pay their property taxes in the future as they are very expensive and her she fears her " money will run out."  Reports chronic pain to her right shoulder and neck, which weighs on her mood, admitting to nightly alcohol intake before bed, which "help's me sleep." Reports she is in charge of managing her medication prescription intake, doesn't feel she is taking too many medications.  Reports her family has had negative experiences with hospital environment, including her son in his 50's  while at [facility] of a heart attack, but she feels he may have been prescribed too much pain medications which led to his death.      Psychiatric Review of Systems:  Mood: mildly dysphoric "I feel not great and not terrible."  Anxiety-including worries " I worry about everything."  "mostly  about how I will pay the property taxes" " I worry about my health and my 's health" " I worry about my daughter."   Neuropsychiatric Symptoms-including forgetfulness, Chronic pain to right shoulder radiating to posterior neck "aching"   Addiction Issues-drinks shot of burbon and a bloody armen nightly, "helps me sleep."    MEDICATIONS  (STANDING):  aspirin  chewable 81 milliGRAM(s) Oral daily  cloNIDine 0.1 milliGRAM(s) Oral daily  docusate sodium 100 milliGRAM(s) Oral three times a day  heparin  Injectable 5000 Unit(s) SubCutaneous every 12 hours  labetalol 100 milliGRAM(s) Oral every 8 hours  pantoprazole    Tablet 40 milliGRAM(s) Oral daily  polyethylene glycol 3350 17 Gram(s) Oral two times a day  pravastatin 20 milliGRAM(s) Oral at bedtime  senna 2 Tablet(s) Oral at bedtime    MEDICATIONS  (PRN):  acetaminophen   Tablet .. 650 milliGRAM(s) Oral every 6 hours PRN Temp greater or equal to 38C (100.4F), Moderate Pain (4 - 6)  aluminum hydroxide/magnesium hydroxide/simethicone Suspension 30 milliLiter(s) Oral every 6 hours PRN Dyspepsia  HYDROmorphone   Tablet 1 milliGRAM(s) Oral every 4 hours PRN Moderate Pain (4 - 6)    Allergies  ACE inhibitors (Other)  hydrALAZINE (Other)    PAST MEDICAL & SURGICAL HISTORY:  Hypertension, unspecified type  History of total hip replacement, right:  at [facility],  No complications  S/p bilateral carotid endarterectomy:  st [facility], no complications  S/P AVR:  at [facility], Dr. Bermudez.  No complications    Past Psychiatric History: Denies  Psychotropic medication trials (agent/adequacy/ effects)-denies  Suicidality/ Aggression-denies  Substance Use History: alcohol-bloody armen drink, followed later in the evening by a shot of whiskey nightly  Social and Personal History: , mother of 1 son and 1 daughter, son  in his 50's of MI   Marriage/ children, romantic and meaningful relationships-presently   Legal, Trauma, Violence History- not known at this time    FAMILY HISTORY:  No pertinent family history in first degree relatives    T(C): 36.6 (10-16-18 @ 16:23), Max: 37.2 (10-16-18 @ 01:53)  HR: 62 (10-16-18 @ 16:23) (62 - 70)  BP: 150/45 (10-16-18 @ 16:23) (137/42 - 194/76)  RR: 16 (10-16-18 @ 16:23) (15 - 16)  SpO2: 97% (10-16-18 @ 16:23) (94% - 97%)  Wt(kg): --    Mental Status Examination:   General Appearance-elderly, thin framed female sitting up in chair with green colored wool hat on head.  -hygiene/grooming-fair  Behavior-calm, pleasant  Attitude/ [id number]  -eye contact-poor  Speech-no abnormalities noted  Thought Process-logical  Thought Content-worries over health and finances, no perceptual disturbances.  -self-harm or aggressive thoughts-denies  Mood-mildly anxious  Affect-congruent  Cognition-alert & oriented, mild distractability, able to answer questions & follow conversation. Not formally tested.  Impulse Control-fair  Insight/Judgment-fair    Studies:    Laboratory testing-    10-16    134<L>  |  103  |  31<H>  ----------------------------<  99  4.9   |  25  |  1.39<H>    Ca    9.3      16 Oct 2018 05:50    Assessment:   Patient with concerns over medical issues as well as financial concerns.  Patient reports to have lost her son while he was hospitalized at [facility] which may increase her & her family's anxiousness while being hospitalized presently.  Would continue to observe patient's condition while hospitalized, without enhanced supervision to ascertain any change in patient's status. Source: Patient, patient's daughter & chart    Reliability: Good    Identifying Data: 89yo  elderly female with HEALTH ISSUES - PROBLEM Dx:  Weakness of left arm, RPLS (reversible posterior leukoencephalopathy syndrome), PRES (posterior reversible encephalopathy syndrome),Encephalopathy, Aortic valve prosthesis presentPrediabetes: Prediabetes,Macrocytic anemia, DANIELLA (acute kidney injury),Hyponatremia,Essential hypertension, Acute CVA (cerebrovascular accident),Hypertension, unspecified type, CVA (cerebral vascular accident)    Chief Complaint: "My son  in this hospital."    History of Present Illness:  89 year old female with PMH HTN and AVR presented to ED at [facility] today via EMS.  Per , patient developed left arm weakness and left facial droop at about 9am yesterday, at that time they did not seek medical intervention.  This morning patient was unsteady in bathroom and  assisted her to the floor.  Called EMS to help with transfer out of bathroom, noted to be hypertensive by EMS and brought to ED against husbands wishes.  In ED SBP's found to be greater than 200.  Denies fever, chills, chest pain, shortness of breath, abdominal pain, lightheadedness, dizziness, headache, visual changes, tremor, syncope. (05 Oct 2018 13:31)    Symptoms and concerns-met with patient & Dr Robert Lind, daughter at bedside initially and then met with patient alone for interview.  Patient reports "worries" over her health and her finances as well as concern for her elderly 's health with whom she lives. Reports concern over her ability to pay their property taxes in the future as they are very expensive and her she fears her " money will run out."  Reports chronic pain to her right shoulder and neck, which weighs on her mood, admitting to nightly alcohol intake before bed, which "help's me sleep." Reports she is in charge of managing her medication prescription intake, doesn't feel she is taking too many medications.  Reports her family has had negative experiences with hospital environment, including her son in his 50's  while at [facility] of a heart attack, but she feels he may have been prescribed too much pain medications which led to his death.      Psychiatric Review of Systems:  Mood: mildly dysphoric "I feel not great and not terrible."  Anxiety-including worries " I worry about everything."  "mostly  about how I will pay the property taxes" " I worry about my health and my 's health" " I worry about my daughter."   Neuropsychiatric Symptoms-including forgetfulness, Chronic pain to right shoulder radiating to posterior neck "aching"   Addiction Issues-drinks shot of burbon and a bloody armen nightly, "helps me sleep."    MEDICATIONS  (STANDING):  aspirin  chewable 81 milliGRAM(s) Oral daily  cloNIDine 0.1 milliGRAM(s) Oral daily  docusate sodium 100 milliGRAM(s) Oral three times a day  heparin  Injectable 5000 Unit(s) SubCutaneous every 12 hours  labetalol 100 milliGRAM(s) Oral every 8 hours  pantoprazole    Tablet 40 milliGRAM(s) Oral daily  polyethylene glycol 3350 17 Gram(s) Oral two times a day  pravastatin 20 milliGRAM(s) Oral at bedtime  senna 2 Tablet(s) Oral at bedtime    MEDICATIONS  (PRN):  acetaminophen   Tablet .. 650 milliGRAM(s) Oral every 6 hours PRN Temp greater or equal to 38C (100.4F), Moderate Pain (4 - 6)  aluminum hydroxide/magnesium hydroxide/simethicone Suspension 30 milliLiter(s) Oral every 6 hours PRN Dyspepsia  HYDROmorphone   Tablet 1 milliGRAM(s) Oral every 4 hours PRN Moderate Pain (4 - 6)    Allergies  ACE inhibitors (Other)  hydrALAZINE (Other)    PAST MEDICAL & SURGICAL HISTORY:  Hypertension, unspecified type  History of total hip replacement, right:  at [facility],  No complications  S/p bilateral carotid endarterectomy:  st [facility], no complications  S/P AVR:  at [facility], Dr. Bermudez.  No complications    Past Psychiatric History: Denies  Psychotropic medication trials (agent/adequacy/ effects)-denies  Suicidality/ Aggression-denies  Substance Use History: alcohol-bloody armen drink, followed later in the evening by a shot of whiskey nightly  Social and Personal History: , mother of 1 son and 1 daughter, son  in his 50's of MI   Marriage/ children, romantic and meaningful relationships-presently   Legal, Trauma, Violence History- not known at this time    FAMILY HISTORY:  No pertinent family history in first degree relatives    T(C): 36.6 (10-16-18 @ 16:23), Max: 37.2 (10-16-18 @ 01:53)  HR: 62 (10-16-18 @ 16:23) (62 - 70)  BP: 150/45 (10-16-18 @ 16:23) (137/42 - 194/76)  RR: 16 (10-16-18 @ 16:23) (15 - 16)  SpO2: 97% (10-16-18 @ 16:23) (94% - 97%)  Wt(kg): --    Mental Status Examination:   General Appearance-elderly, thin framed female sitting up in chair with green colored wool hat on head.  -hygiene/grooming-fair  Behavior-calm, pleasant  Attitude/ Bwzczztjlqxwj-[id number]  -eye contact-poor  Speech-no abnormalities noted  Thought Process-logical  Thought Content-worries over health and finances, no perceptual disturbances.  -self-harm or aggressive thoughts-denies  Mood-mildly anxious  Affect-congruent  Cognition-alert & oriented, mild distractability, able to answer questions & follow conversation. Not formally tested.  Impulse Control-fair  Insight/Judgment-fair    Studies:    Laboratory testing-    10-16    134<L>  |  103  |  31<H>  ----------------------------<  99  4.9   |  25  |  1.39<H>    Ca    9.3      16 Oct 2018 05:50    Assessment: Rule out Generalized Anxiety disorder, Adjustment d/o with anxiety (provisional diagnosis).  Patient with concerns over medical issues as well as financial concerns.  Patient reports to have lost her son while he was hospitalized at [facility] which may increase her & her family's anxiousness while being hospitalized presently.  Would continue to observe patient's condition while hospitalized, without enhanced supervision to ascertain any change in patient's status.

## 2018-10-16 NOTE — CONSULT NOTE ADULT - SUBJECTIVE AND OBJECTIVE BOX
HPI:  89 year old female with PMH HTN and AVR presented to ED at [facility] today via EMS.  Per , patient developed left arm weakness and left facial droop at about 9am yesterday, at that time they did not seek medical intervention.  This morning patient was unsteady in bathroom and  assisted her to the floor.  Called EMS to help with transfer out of bathroom, noted to be hypertensive by EMS and brought to ED against husbands wishes.  In ED SBP's found to be greater than 200.  Denies fever, chills, chest pain, shortness of breath, abdominal pain, lightheadedness, dizziness, headache, visual changes, tremor, syncope. 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 Seen by endocrinology who considered possibility of partial adrenal insuff.    Consulted for pain management. Two pain areas: a right shoulder with limited ROM, for several years pain from osteoarthritis, told she needed a shoulder replacement. NSAIDS including Mobic, Motrin, Celebrex - little effect though the patient reported ibuprofen had some beneficial effect. Percocet made her vomit, as did tramadol; never was tried on Nucynta. Did receive dilaudid for a right hip repaired for traumatic arthritis 2014-effective and tolerated it well. Pain is constant unless her shoulder is still in only certain positions, a 9 out of 10. Pt is a extremely good historian.    She also reports positional occasional pain in left hip, sacral area radiating down left leg, relieved with changes in position. Not that bothersome as compared to the shoulder.      PAST MEDICAL & SURGICAL HISTORY:  Hypertension, unspecified type  History of total hip replacement, right: 2014 at [facility],  No complications  S/p bilateral carotid endarterectomy: 2008 st [facility], no complications  S/P AVR: 2008 at [facility], Dr. Bermudez.  No complications      SOCIAL HISTORY:    Admitted from:  home assisted living [facility]   Substance abuse history:              Tobacco hx:                  Alcohol hx:              Home Opioid hx:  Surrogate/HCP/Guardian:            Phone#:    FAMILY HISTORY:  No pertinent family history in first degree relatives    Baseline ADLs (prior to admission):    Allergies    ACE inhibitors (Other)  hydrALAZINE (Other)    Intolerances      Review of Symptoms & Present Symptoms:   Dyspnea:   Nausea/Vomiting:   Anxiety:  Depressed   Fatigue:  Loss of appetite:   Pain:      -----see above                          location:     ------see above        [All others negative or Unable to obtain due to poor mentation]    MEDICATIONS  (STANDING):  aspirin  chewable 81 milliGRAM(s) Oral daily  cloNIDine 0.1 milliGRAM(s) Oral daily  docusate sodium 100 milliGRAM(s) Oral three times a day  heparin  Injectable 5000 Unit(s) SubCutaneous every 12 hours  labetalol 100 milliGRAM(s) Oral every 8 hours  pantoprazole    Tablet 40 milliGRAM(s) Oral daily  polyethylene glycol 3350 17 Gram(s) Oral two times a day  pravastatin 20 milliGRAM(s) Oral at bedtime  senna 2 Tablet(s) Oral at bedtime    MEDICATIONS  (PRN):  acetaminophen   Tablet .. 650 milliGRAM(s) Oral every 6 hours PRN Temp greater or equal to 38C (100.4F), Moderate Pain (4 - 6)  aluminum hydroxide/magnesium hydroxide/simethicone Suspension 30 milliLiter(s) Oral every 6 hours PRN Dyspepsia      PHYSICAL EXAM:    Vital Signs Last 24 Hrs  T(C): 36.3 (16 Oct 2018 08:42), Max: 37.2 (16 Oct 2018 01:53)  T(F): 97.4 (16 Oct 2018 08:42), Max: 98.9 (16 Oct 2018 01:53)  HR: 67 (16 Oct 2018 08:42) (65 - 70)  BP: 149/45 (16 Oct 2018 08:42) (144/48 - 194/76)  BP(mean): --  RR: 15 (16 Oct 2018 08:42) (15 - 16)  SpO2: 97% (16 Oct 2018 08:42) (94% - 97%)    General: alert  oriented x _3___    Karnofsky Performance Score/Palliative Performance Status Version2:    30 %    HEENT: Good color, no cachexia  Neck: Neg  Lungs: Clear  Heart: Reg  Abdomen: Soft, non-tender, no enlarged organs, BS OK   : -----  Back  :Neg   Musculoskeletal: Neg  Ext: No edema; right shoulder ROM very limited up and down with severe pain elicited, lateral motion to the right and left much better. Really severe crepitus, with jerking motion felt as right shoulder was moved. Arthritic changes of ankles  Skin: Neg  Neuro: Oriented x3, left arm paresis, no facial drooping  Diet: [NPO]    LABS:    10-16    134<L>  |  103  |  31<H>  ----------------------------<  99  4.9   |  25  |  1.39<H>    Ca    9.3      16 Oct 2018 05:50    TPro  6.9  /  Alb  2.7<L>  /  TBili  0.2  /  DBili  0.1  /  AST  26  /  ALT  24  /  AlkPhos  54  10-14        RADIOLOGY & ADDITIONAL STUDIES:    ADVANCE DIRECTIVES:   Advanced Care Planning discussion total time spent:

## 2018-10-16 NOTE — PROGRESS NOTE ADULT - SUBJECTIVE AND OBJECTIVE BOX
Follow up for hypertenison  SUBJ:    looks much improved eating lunch with assistance daughter at bedside     [facility]  Hypertension, unspecified type      MEDICATIONS  (STANDING):  aspirin  chewable 81 milliGRAM(s) Oral daily  cloNIDine 0.1 milliGRAM(s) Oral daily  docusate sodium 100 milliGRAM(s) Oral three times a day  heparin  Injectable 5000 Unit(s) SubCutaneous every 12 hours  labetalol 100 milliGRAM(s) Oral every 8 hours  pantoprazole    Tablet 40 milliGRAM(s) Oral daily  polyethylene glycol 3350 17 Gram(s) Oral two times a day  pravastatin 20 milliGRAM(s) Oral at bedtime  senna 2 Tablet(s) Oral at bedtime    MEDICATIONS  (PRN):  acetaminophen   Tablet .. 650 milliGRAM(s) Oral every 6 hours PRN Temp greater or equal to 38C (100.4F), Moderate Pain (4 - 6)  aluminum hydroxide/magnesium hydroxide/simethicone Suspension 30 milliLiter(s) Oral every 6 hours PRN Dyspepsia  HYDROmorphone   Tablet 1 milliGRAM(s) Oral every 4 hours PRN Moderate Pain (4 - 6)        PHYSICAL EXAM:  Vital Signs Last 24 Hrs  T(C): 36.6 (16 Oct 2018 16:23), Max: 37.2 (16 Oct 2018 01:53)  T(F): 97.8 (16 Oct 2018 16:23), Max: 98.9 (16 Oct 2018 01:53)  HR: 62 (16 Oct 2018 16:23) (62 - 70)  BP: 150/45 (16 Oct 2018 16:23) (137/42 - 194/76)  BP(mean): --  RR: 16 (16 Oct 2018 16:23) (15 - 16)  SpO2: 97% (16 Oct 2018 16:23) (94% - 97%)    GENERAL: NAD, elderly weak frail daughter present  HEAD:  Atraumatic, Normocephalic  EYES: EOMI, PERRLA, conjunctiva and sclera clear  ENT: Moist mucous membranes,  NECK: Supple, No JVD, no bruits  CHEST/LUNG: Clear to percussion bilaterally; No rales, rhonchi, wheezing, or rubs  HEART: Regular rate and rhythm; No murmurs, rubs, or gallops PMI non displaced.  ABDOMEN: Soft, Nontender, Nondistended; Bowel sounds present  EXTREMITIES:  2+ Peripheral Pulses, No clubbing, cyanosis, or edema  SKIN: No rashes or lesions  NERVOUS SYSTEM:  Cranial Nerves II-XII intact      TELEMETRY:  rsr    ECG:  ECHO:    LABS:    10-16    134<L>  |  103  |  31<H>  ----------------------------<  99  4.9   |  25  |  1.39<H>    Ca    9.3      16 Oct 2018 05:50      I&O's Summary    15 Oct 2018 07:01  -  16 Oct 2018 07:00  --------------------------------------------------------  IN: 750 mL / OUT: 250 mL / NET: 500 mL    16 Oct 2018 07:01  -  16 Oct 2018 16:43  --------------------------------------------------------  IN: 400 mL / OUT: 0 mL / NET: 400 mL      BNP    RADIOLOGY & ADDITIONAL STUDIES:    ECHO:  < from: TTE Echo Complete w/Doppler (10.07.18 @ 09:03) >  Summary:   1. Left ventricular ejection fraction, by visual estimation, is 55 to   60%.   2. Normal global left ventricular systolic function.   3. Increased LV wall thickness.   4. Spectral Doppler shows impaired relaxation pattern of left   ventricular myocardial filling (Grade I diastolic dysfunction).   5. There is mild concentric left ventricular hypertrophy.   6. Thickening of the anterior and posterior mitral valve leaflets.   7. Mild tricuspid regurgitation.   8. Mild aortic regurgitation.   9. Pulmonic valve regurgitation.  10. Structurally normal pulmonic valve, with normal leaflet excursion.  11. Estimated pulmonary artery systolic pressure is 43.8 mmHg assuming a   right atrial pressure of 10 mmHg, which is consistent with mild pulmonary   hypertension.  12. LA volume Index is 71.8 ml/m² ml/m2.  13. The aortic valve mean gradient is 6.7 mmHg consistent with normally   opening aortic valve.    [id number] Gary Rivers MD,FACC , Electronically signed on 10/7/2018 at   12:52:29 PM       < end of copied text >

## 2018-10-16 NOTE — PROGRESS NOTE ADULT - PROBLEM SELECTOR PLAN 4
Fluid restriction 1500cc  Nephrology following  continue to monitor daily Fluid restriction 1500cc  Nephrology following  continue to monitor daily  well controlled plan dc in 24-48hours

## 2018-10-16 NOTE — CONSULT NOTE ADULT - PROVIDER SPECIALTY LIST ADULT
Endocrinology
Nephrology
Pain Medicine
Psychiatry
Rehab Medicine
Rehab Medicine
Neurology
Cardiology

## 2018-10-17 LAB
ANION GAP SERPL CALC-SCNC: 8 MMOL/L — SIGNIFICANT CHANGE UP (ref 5–17)
BUN SERPL-MCNC: 35 MG/DL — HIGH (ref 7–23)
CALCIUM SERPL-MCNC: 8.8 MG/DL — SIGNIFICANT CHANGE UP (ref 8.4–10.5)
CHLORIDE SERPL-SCNC: 101 MMOL/L — SIGNIFICANT CHANGE UP (ref 96–108)
CO2 SERPL-SCNC: 25 MMOL/L — SIGNIFICANT CHANGE UP (ref 22–31)
CORTICOSTEROID BINDING GLOBULIN RESULT: 2.2 MG/DL — SIGNIFICANT CHANGE UP
CORTIS F/TOTAL MFR SERPL: 8.1 % — SIGNIFICANT CHANGE UP
CORTIS SERPL-MCNC: 12 UG/DL — SIGNIFICANT CHANGE UP
CORTISOL, FREE RESULT: 0.97 UG/DL — SIGNIFICANT CHANGE UP
CREAT SERPL-MCNC: 1.54 MG/DL — HIGH (ref 0.5–1.3)
GLUCOSE SERPL-MCNC: 97 MG/DL — SIGNIFICANT CHANGE UP (ref 70–99)
HCT VFR BLD CALC: 29.9 % — LOW (ref 34.5–45)
HGB BLD-MCNC: 9.9 G/DL — LOW (ref 11.5–15.5)
MCHC RBC-ENTMCNC: 33.2 GM/DL — SIGNIFICANT CHANGE UP (ref 32–36)
MCHC RBC-ENTMCNC: 33.3 PG — SIGNIFICANT CHANGE UP (ref 27–34)
MCV RBC AUTO: 100.2 FL — HIGH (ref 80–100)
PLATELET # BLD AUTO: 232 K/UL — SIGNIFICANT CHANGE UP (ref 150–400)
POTASSIUM SERPL-MCNC: 4.8 MMOL/L — SIGNIFICANT CHANGE UP (ref 3.5–5.3)
POTASSIUM SERPL-SCNC: 4.8 MMOL/L — SIGNIFICANT CHANGE UP (ref 3.5–5.3)
RBC # BLD: 2.99 M/UL — LOW (ref 3.8–5.2)
RBC # FLD: 12.7 % — SIGNIFICANT CHANGE UP (ref 10.3–14.5)
SODIUM SERPL-SCNC: 134 MMOL/L — LOW (ref 135–145)
SULFONYLUREA SERPL-MCNC: SIGNIFICANT CHANGE UP
WBC # BLD: 6.6 K/UL — SIGNIFICANT CHANGE UP (ref 3.8–10.5)
WBC # FLD AUTO: 6.6 K/UL — SIGNIFICANT CHANGE UP (ref 3.8–10.5)

## 2018-10-17 PROCEDURE — 99233 SBSQ HOSP IP/OBS HIGH 50: CPT

## 2018-10-17 RX ORDER — LABETALOL HCL 100 MG
100 TABLET ORAL EVERY 12 HOURS
Qty: 0 | Refills: 0 | Status: DISCONTINUED | OUTPATIENT
Start: 2018-10-17 | End: 2018-10-18

## 2018-10-17 RX ORDER — LABETALOL HCL 100 MG
100 TABLET ORAL
Qty: 0 | Refills: 0 | Status: DISCONTINUED | OUTPATIENT
Start: 2018-10-17 | End: 2018-10-17

## 2018-10-17 RX ADMIN — Medication 81 MILLIGRAM(S): at 11:41

## 2018-10-17 RX ADMIN — Medication 650 MILLIGRAM(S): at 22:30

## 2018-10-17 RX ADMIN — HYDROMORPHONE HYDROCHLORIDE 1 MILLIGRAM(S): 2 INJECTION INTRAMUSCULAR; INTRAVENOUS; SUBCUTANEOUS at 12:10

## 2018-10-17 RX ADMIN — POLYETHYLENE GLYCOL 3350 17 GRAM(S): 17 POWDER, FOR SOLUTION ORAL at 18:05

## 2018-10-17 RX ADMIN — HEPARIN SODIUM 5000 UNIT(S): 5000 INJECTION INTRAVENOUS; SUBCUTANEOUS at 06:06

## 2018-10-17 RX ADMIN — Medication 650 MILLIGRAM(S): at 22:14

## 2018-10-17 RX ADMIN — HEPARIN SODIUM 5000 UNIT(S): 5000 INJECTION INTRAVENOUS; SUBCUTANEOUS at 18:05

## 2018-10-17 RX ADMIN — Medication 100 MILLIGRAM(S): at 21:57

## 2018-10-17 RX ADMIN — Medication 0.1 MILLIGRAM(S): at 06:06

## 2018-10-17 RX ADMIN — Medication 100 MILLIGRAM(S): at 15:02

## 2018-10-17 RX ADMIN — Medication 100 MILLIGRAM(S): at 06:06

## 2018-10-17 RX ADMIN — PANTOPRAZOLE SODIUM 40 MILLIGRAM(S): 20 TABLET, DELAYED RELEASE ORAL at 11:40

## 2018-10-17 RX ADMIN — Medication 0.1 MILLIGRAM(S): at 16:41

## 2018-10-17 NOTE — PROGRESS NOTE ADULT - SUBJECTIVE AND OBJECTIVE BOX
Awake, appetite fair    Vital Signs Last 24 Hrs  T(C): 36.6 (10-17-18 @ 16:44), Max: 36.8 (10-16-18 @ 20:18)  T(F): 97.9 (10-17-18 @ 16:44), Max: 98.3 (10-17-18 @ 09:11)  HR: 65 (10-17-18 @ 16:44) (65 - 70)  BP: 188/54 (10-17-18 @ 16:44) (150/57 - 196/52)  BP(mean): 67 (10-17-18 @ 09:11) (67 - 88)  RR: 14 (10-17-18 @ 16:44) (14 - 16)  SpO2: 95% (10-17-18 @ 16:44) (94% - 98%)    Gen no distress  Lungs good air entry b/l  Abd soft, ND  Extr no edema  Skin no rash                                                                           9.9    6.6   )-----------( 232      ( 17 Oct 2018 06:30 )             29.9     17 Oct 2018 06:30    134    |  101    |  35     ----------------------------<  97     4.8     |  25     |  1.54     Ca    8.8        17 Oct 2018 06:30    acetaminophen   Tablet .. 650 milliGRAM(s) Oral every 6 hours PRN  aluminum hydroxide/magnesium hydroxide/simethicone Suspension 30 milliLiter(s) Oral every 6 hours PRN  aspirin  chewable 81 milliGRAM(s) Oral daily  cloNIDine 0.1 milliGRAM(s) Oral every 12 hours  docusate sodium 100 milliGRAM(s) Oral three times a day  heparin  Injectable 5000 Unit(s) SubCutaneous every 12 hours  HYDROmorphone   Tablet 1 milliGRAM(s) Oral every 4 hours PRN  labetalol 100 milliGRAM(s) Oral every 12 hours  pantoprazole    Tablet 40 milliGRAM(s) Oral daily  polyethylene glycol 3350 17 Gram(s) Oral two times a day  pravastatin 20 milliGRAM(s) Oral at bedtime  senna 2 Tablet(s) Oral at bedtime    A/P:    S/p CVA  CKD III at baseline  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  D/w daughter who is in agreement  D/w medical team

## 2018-10-17 NOTE — PROGRESS NOTE ADULT - SUBJECTIVE AND OBJECTIVE BOX
Patient is a 89y old  Female who presents with a chief complaint of Weakness (17 Oct 2018 13:17)    Patient reports no chest pain and no   Patient seen and examined at bedside.    ALLERGIES:  ACE inhibitors (Other)  hydrALAZINE (Other)    MEDICATIONS:  acetaminophen   Tablet .. 650 milliGRAM(s) Oral every 6 hours PRN  aluminum hydroxide/magnesium hydroxide/simethicone Suspension 30 milliLiter(s) Oral every 6 hours PRN  aspirin  chewable 81 milliGRAM(s) Oral daily  cloNIDine 0.1 milliGRAM(s) Oral daily  docusate sodium 100 milliGRAM(s) Oral three times a day  heparin  Injectable 5000 Unit(s) SubCutaneous every 12 hours  HYDROmorphone   Tablet 1 milliGRAM(s) Oral every 4 hours PRN  labetalol 100 milliGRAM(s) Oral <User Schedule>  pantoprazole    Tablet 40 milliGRAM(s) Oral daily  polyethylene glycol 3350 17 Gram(s) Oral two times a day  pravastatin 20 milliGRAM(s) Oral at bedtime  senna 2 Tablet(s) Oral at bedtime    Vital Signs Last 24 Hrs  T(F): 97.8 (17 Oct 2018 12:01), Max: 98.3 (17 Oct 2018 09:11)  HR: 69 (17 Oct 2018 12:01) (62 - 70)  BP: 150/57 (17 Oct 2018 12:01) (150/45 - 196/52)  RR: 14 (17 Oct 2018 12:01) (14 - 16)  SpO2: 94% (17 Oct 2018 12:01) (94% - 98%)  I&O's Summary    16 Oct 2018 07:01  -  17 Oct 2018 07:00  --------------------------------------------------------  IN: 400 mL / OUT: 500 mL / NET: -100 mL    17 Oct 2018 07:01  -  17 Oct 2018 14:36  --------------------------------------------------------  IN: 400 mL / OUT: 0 mL / NET: 400 mL        PHYSICAL EXAM:  General: NAD, A/O x 3  ENT: MMM  Neck: Supple, No JVD  Lungs: Clear to auscultation bilaterally  Cardio: RRR, S1/S2, No murmurs  Abdomen: Soft, Nontender, Nondistended; Bowel sounds present  Extremities: No cyanosis, No edema    LABS:                        9.9    6.6   )-----------( 232      ( 17 Oct 2018 06:30 )             29.9     10-17    134  |  101  |  35  ----------------------------<  97  4.8   |  25  |  1.54    Ca    8.8      17 Oct 2018 06:30    TPro  6.9  /  Alb  2.7  /  TBili  0.2  /  DBili  0.1  /  AST  26  /  ALT  24  /  AlkPhos  54  10-14    eGFR if Non African American: 29 mL/min/1.73M2 (10-17-18 @ 06:30)  eGFR if African American: 33 mL/min/1.73M2 (10-17-18 @ 06:30)        CARDIAC MARKERS ( 14 Oct 2018 16:30 )  x     / x     / 39 U/L / x     / x          10-06 Chol 185 mg/dL LDL 77 mg/dL HDL 92 mg/dL Trig 81 mg/dL        CAPILLARY BLOOD GLUCOSE        10-06 [id number] 5.7          RADIOLOGY & ADDITIONAL TESTS:    Care Discussed with Consultants/Other Providers: Patient is a 89y old  Female who presents with a chief complaint of Weakness (17 Oct 2018 13:17)    Patient reports no chest pain and no shortness breath.  Reports that pain was well controlled overnight.    Patient seen and examined at bedside.    ALLERGIES:  ACE inhibitors (Other)  hydrALAZINE (Other)    MEDICATIONS:  acetaminophen   Tablet .. 650 milliGRAM(s) Oral every 6 hours PRN  aluminum hydroxide/magnesium hydroxide/simethicone Suspension 30 milliLiter(s) Oral every 6 hours PRN  aspirin  chewable 81 milliGRAM(s) Oral daily  cloNIDine 0.1 milliGRAM(s) Oral daily  docusate sodium 100 milliGRAM(s) Oral three times a day  heparin  Injectable 5000 Unit(s) SubCutaneous every 12 hours  HYDROmorphone   Tablet 1 milliGRAM(s) Oral every 4 hours PRN  labetalol 100 milliGRAM(s) Oral <User Schedule>  pantoprazole    Tablet 40 milliGRAM(s) Oral daily  polyethylene glycol 3350 17 Gram(s) Oral two times a day  pravastatin 20 milliGRAM(s) Oral at bedtime  senna 2 Tablet(s) Oral at bedtime    Vital Signs Last 24 Hrs  T(F): 97.8 (17 Oct 2018 12:01), Max: 98.3 (17 Oct 2018 09:11)  HR: 69 (17 Oct 2018 12:01) (62 - 70)  BP: 150/57 (17 Oct 2018 12:01) (150/45 - 196/52)  RR: 14 (17 Oct 2018 12:01) (14 - 16)  SpO2: 94% (17 Oct 2018 12:01) (94% - 98%)  I&O's Summary    16 Oct 2018 07:01  -  17 Oct 2018 07:00  --------------------------------------------------------  IN: 400 mL / OUT: 500 mL / NET: -100 mL    17 Oct 2018 07:01  -  17 Oct 2018 14:36  --------------------------------------------------------  IN: 400 mL / OUT: 0 mL / NET: 400 mL        PHYSICAL EXAM:  General: NAD, A/O x 3  ENT: MMM  Neck: Supple, No JVD  Lungs: Clear to auscultation bilaterally  Cardio: RRR, S1/S2, No murmurs  Abdomen: Soft, Nontender, Nondistended; Bowel sounds present  Extremities: No cyanosis, No edema    LABS:                        9.9    6.6   )-----------( 232      ( 17 Oct 2018 06:30 )             29.9     10-17    134  |  101  |  35  ----------------------------<  97  4.8   |  25  |  1.54    Ca    8.8      17 Oct 2018 06:30    TPro  6.9  /  Alb  2.7  /  TBili  0.2  /  DBili  0.1  /  AST  26  /  ALT  24  /  AlkPhos  54  10-14    eGFR if Non African American: 29 mL/min/1.73M2 (10-17-18 @ 06:30)  eGFR if African American: 33 mL/min/1.73M2 (10-17-18 @ 06:30)        CARDIAC MARKERS ( 14 Oct 2018 16:30 )  x     / x     / 39 U/L / x     / x          10-06 Chol 185 mg/dL LDL 77 mg/dL HDL 92 mg/dL Trig 81 mg/dL        CAPILLARY BLOOD GLUCOSE        10-06 [id number] 5.7      Care Discussed with Consultants/Other Providers: Dr. Pickard, Dr. Foreman, Dr. Chu

## 2018-10-17 NOTE — PROGRESS NOTE ADULT - ASSESSMENT
89 year old with PMH of HTN, AVR, CKD-3, bilateral cardiotomy admitted to [facility] with a new Right sided MCA ischemic stroke on 10/5/18.  During this admission patient developed SIADH and hyponatremia     One-to-one cancelled after risk benefit analysis by hospital administration 10/16    Reviewed elevated BP in the early morning with Cardiology, Nephrology and daughter.  Cardiology recommended increasing clonidine, nephrology recommended increasing labetalol.  Reviewed every recorded BP with daughter over the last 72 hrs.  Daughter refused any increases in medications, wants to try moving PM labetalol dose to 8pm.  Will do and continue to monitor.    Patient seen by rehab therapy- recommend sub-acute.   working on authorization.

## 2018-10-17 NOTE — PROGRESS NOTE ADULT - SUBJECTIVE AND OBJECTIVE BOX
· Subjective and Objective: 	  Patient is a 89y old  female who presented to [facility] on 10/5 with a chief complaint of left sided weakness since that preceding morning.  H/o hypertension, bilateral carotid endarterectomy and AVR replacement years ago.  From previous discussion with daughter, patient's BPs have been hard to control over the years and had been on BP medication at home but often her systolic BPs are in the 180-220s. In the ED, her SBP was 220. Out of window for tPA. Initial CT of head did not reveal any abnormalities however on repeat CT on the 6th, evolving acute/subacute infarcts within the high right paramedian parietal and right occipital lobes were noted. No hemorrhagic transformation seen. Neurology and cardiology were consulted. TTE and carotid dopplers without significant findings. No arrythmia found on telemetry monitoring. SBPs have continued to trend around 180 systolically. Catapress patch was applied on 10/6.  PT was unable to see patient due to SBP in the 180s. OT saw patient and found that patient was mod to max assist for bed mobility and transfers.   The evening of the 8th, patient was transferred to ICU for hyponatremia, mental status change, and hypoglycemia with a blood sugar in the 30s. Patient is being followed by cardiology. SBPs are still labile. Has not had any episodes of arrythmia noted on telemetry monitoring Considering monitoring as an outpatient.   Neurology continues to follow along. Monitoring metabolic encephalopathy 2/2 hyponatremia and residual deficits from CVA. Nephrology managing hyponatremia.  With physical therapy, patient is mod A x 2 for bed mobility and transfers today on 10/12. Speech therapy cleared patient for soft solids with thin liquids.  PMR re-consulted for weigh in on disposition planning.     PAST MEDICAL & SURGICAL HISTORY:  Hypertension, unspecified type  History of total hip replacement, right: 2014 at [facility],  No complications  S/p bilateral carotid endarterectomy: 2008 st [facility], no complications  S/P AVR: 2008 at [facility], Dr. Bermudez.  No complications  Right shoulder arthritis  Left radial nerve palsy after fall in 2017  Left carpel tunnel   macular degeneration       Allergies    ACE inhibitors (Other)  hydrALAZINE (Other)    MEDICATIONS  (STANDING):  amLODIPine   Tablet 5 milliGRAM(s) Oral daily  aspirin  chewable 81 milliGRAM(s) Oral daily  atorvastatin 40 milliGRAM(s) Oral at bedtime  cloNIDine 0.1 milliGRAM(s) Oral every 8 hours  docusate sodium 100 milliGRAM(s) Oral three times a day  heparin  Injectable 5000 Unit(s) SubCutaneous every 12 hours  labetalol 100 milliGRAM(s) Oral every 8 hours  pantoprazole    Tablet 40 milliGRAM(s) Oral daily  polyethylene glycol 3350 17 Gram(s) Oral two times a day  predniSONE   Tablet 10 milliGRAM(s) Oral every 12 hours  senna 2 Tablet(s) Oral at bedtime    MEDICATIONS  (PRN):  acetaminophen   Tablet .. 650 milliGRAM(s) Oral every 6 hours PRN Temp greater or equal to 38C (100.4F), Moderate Pain (4 - 6)      Social Hx: Lives in  with disabled spouse. Spouse has SCI and in mostly WC bound. Pt will bring spouse clothes and meals, but he is able to bath and dress himself. Patient was independent for all of her ADLs prior to stroke. Pt used a cane for balance. No longer drives due to macular degeneration.     TODAY'S SUBJECTIVE & REVIEW OF SYMPTOMS:  [ x  ] Constitutional WNL  [   ] Cardio WNL  [ x  ] Resp WNL  [ x  ] GI WNL  [ x  ] Heme WNL  [ x  ] Endo WNL  [ x  ] Skin WNL  [   ] MSK WNL  [   ] Neuro WNL  [ x  ] Cognitive WNL  [ x  ] Psych WNL  Patient reports continues to have right sided neck pain, right shoulder pain, and had some ankle pain yesterday.     Vital Signs Last 24 Hrs  T(C): 36.6 (17 Oct 2018 12:01), Max: 36.8 (16 Oct 2018 20:18)  T(F): 97.8 (17 Oct 2018 12:01), Max: 98.3 (17 Oct 2018 09:11)  HR: 69 (17 Oct 2018 12:01) (62 - 70)  BP: 150/57 (17 Oct 2018 12:01) (150/45 - 196/52)  BP(mean): 67 (17 Oct 2018 09:11) (67 - 88)  RR: 14 (17 Oct 2018 12:01) (14 - 16)  SpO2: 94% (17 Oct 2018 12:01) (94% - 98%)                          10-17    134<L>  |  101  |  35<H>  ----------------------------<  97  4.8   |  25  |  1.54<H>    Ca    8.8      17 Oct 2018 06:30                          9.9    6.6   )-----------( 232      ( 17 Oct 2018 06:30 )             29.9     < from: CT Head No Cont (10.06.18 @ 11:04) >  EXAM:  CT BRAIN      PROCEDURE DATE:  10/06/2018        INTERPRETATION:  .    CLINICAL INFORMATION: Cerebrovascular accident.    TECHNIQUE: Multiple axial CT images of the head were obtained without   contrast. Sagittal and coronal reconstructed images were acquired from   the source data.    COMPARISON: Prior head CT examination from 10/5/2018.    FINDINGS: An evolving transcortical infarct is notable within the high   right paramedian parietal lobe. Evolving infarct is also notable within   the right occipital lobe.    There is a chronic infarct within the right posterior frontal lobe. A   chronic infarct is also noted within the left cerebellar hemisphere.   There is also an area of encephalomalacia and gliosis within the right   basal frontal lobe related to prior insult.    There is diffuse cerebral volume loss with prominence of the sulci,   fissures, and cisternal spaces which is normal for the patient's age.   There is mild periventricular white matter hypoattenuation statistically   compatible with microvascular changes given calcific atherosclerotic   disease of the intracranial arteries.    There is no acute intracranial hemorrhage, shift of the midline   structures, or hydrocephalus.    The paranasal sinuses and mastoid air cells are clear. The calvarium   appears intact. Bilateral senile scleral plaques are noted. There is   evidence of bilateral cataract removal.     IMPRESSION: Evolving acute/subacute infarcts within the high right   paramedian parietal and right occipital lobes. No hemorrhagic   transformation.    Other chronic findings, as discussed      < end of copied text >        On Neurological Examination:  Mental Status -Alert, oriented X3.   Cranial Nerves - left facial asymmetry, no dysarthria, left field cut, left sided neglect evident-difficulty attending to the left side (had been able to pass midline in exam on friday). D  Motor Exam -   Right upper diminished AROM secondary to pain, 3/5 shoulder strength, 4/5 elbow strengths, 5/5 hand and wrist strengths.   Right upper diminished AROM secondary to pain, 3/5 shoulder strength, 4/5 elbow strengths,  hand and wrist strengths.   Left upper increased tone to left upper extremity, 2/5 motor shoulder and elbow, 0/5 wrist and fingers  Right lower 4+/5 hip flexor, 4/5 knee and DF/PF  Left lower 3/5 hip flexor, 4/5 knee and DF/PF  Sensory    Impaired on left   GENERAL Exam:     Nontoxic , No Acute Distress   HEENT:  normocephalic, atraumatic		  LUNGS:	Clear bilaterally    HEART:	 Normal S1S2      GI/ ABDOMEN:  Soft  Non tender +BS  SKIN: Dressing intact to left lower extremity    First functional exam at 1130am: Patient in bed and able to get to side of the bed with mod A x1 to sit at the edge. Due to pain, unwilling to attempt to stand.     Second Functional Exam at 1250pm: patient was medicated about 45 minutes prior to this exam with dilaudid) max A x1  for transfer from sit to stand. Patient was unable to stand for greater than about 10 seconds before requesting to sit back down. While getting up to stand, patient reports pain in neck and right shoulder is severe. I was unable to ambulate with pt due to poor tolerance and decreased endurance with severe pain.    Patient continues to be limited by left sided weakness, neglect, very poor endurance, and pain.  Patient is an appropriate candidate for BEATRIS given pt's endurance and tolerance to therapy. Discussed with Dr Pickard, who continues to agree that BEATRIS placement is most appropriate.

## 2018-10-17 NOTE — PROGRESS NOTE ADULT - PROBLEM SELECTOR PLAN 4
Fluid restriction 1500cc  Nephrology following  continue to monitor daily  well controlled plan dc in 24-48hours

## 2018-10-17 NOTE — PROGRESS NOTE ADULT - ASSESSMENT
89 yo woman with h/o HTN and arthritis with acute right hemispheric MCA infarct with left hemiparesis, gait and ADL dysfunction, visual field deficits.     Recommend: BEATRIS placement most appropriate as noted above.

## 2018-10-18 ENCOUNTER — TRANSCRIPTION ENCOUNTER (OUTPATIENT)
Age: 83
End: 2018-10-18

## 2018-10-18 VITALS
HEART RATE: 56 BPM | SYSTOLIC BLOOD PRESSURE: 155 MMHG | DIASTOLIC BLOOD PRESSURE: 67 MMHG | OXYGEN SATURATION: 97 % | RESPIRATION RATE: 16 BRPM | TEMPERATURE: 98 F

## 2018-10-18 PROBLEM — I10 ESSENTIAL (PRIMARY) HYPERTENSION: Chronic | Status: ACTIVE | Noted: 2018-10-05

## 2018-10-18 LAB
ANION GAP SERPL CALC-SCNC: 7 MMOL/L — SIGNIFICANT CHANGE UP (ref 5–17)
BUN SERPL-MCNC: 33 MG/DL — HIGH (ref 7–23)
CALCIUM SERPL-MCNC: 9.1 MG/DL — SIGNIFICANT CHANGE UP (ref 8.4–10.5)
CHLORIDE SERPL-SCNC: 98 MMOL/L — SIGNIFICANT CHANGE UP (ref 96–108)
CO2 SERPL-SCNC: 25 MMOL/L — SIGNIFICANT CHANGE UP (ref 22–31)
CREAT SERPL-MCNC: 1.42 MG/DL — HIGH (ref 0.5–1.3)
GLUCOSE SERPL-MCNC: 106 MG/DL — HIGH (ref 70–99)
POTASSIUM SERPL-MCNC: 4.6 MMOL/L — SIGNIFICANT CHANGE UP (ref 3.5–5.3)
POTASSIUM SERPL-SCNC: 4.6 MMOL/L — SIGNIFICANT CHANGE UP (ref 3.5–5.3)
SODIUM SERPL-SCNC: 130 MMOL/L — LOW (ref 135–145)

## 2018-10-18 PROCEDURE — 99232 SBSQ HOSP IP/OBS MODERATE 35: CPT

## 2018-10-18 PROCEDURE — 99239 HOSP IP/OBS DSCHRG MGMT >30: CPT

## 2018-10-18 RX ORDER — DOCUSATE SODIUM 100 MG
1 CAPSULE ORAL
Qty: 0 | Refills: 0 | COMMUNITY
Start: 2018-10-18

## 2018-10-18 RX ORDER — POLYETHYLENE GLYCOL 3350 17 G/17G
17 POWDER, FOR SOLUTION ORAL
Qty: 0 | Refills: 0 | COMMUNITY
Start: 2018-10-18

## 2018-10-18 RX ORDER — LABETALOL HCL 100 MG
100 TABLET ORAL
Qty: 0 | Refills: 0 | Status: DISCONTINUED | OUTPATIENT
Start: 2018-10-18 | End: 2018-10-18

## 2018-10-18 RX ORDER — FERROUS SULFATE 325(65) MG
1 TABLET ORAL
Qty: 0 | Refills: 0 | COMMUNITY
Start: 2018-10-18

## 2018-10-18 RX ORDER — SENNA PLUS 8.6 MG/1
2 TABLET ORAL
Qty: 0 | Refills: 0 | COMMUNITY
Start: 2018-10-18

## 2018-10-18 RX ORDER — ASPIRIN/CALCIUM CARB/MAGNESIUM 324 MG
1 TABLET ORAL
Qty: 0 | Refills: 0 | COMMUNITY
Start: 2018-10-18

## 2018-10-18 RX ORDER — VALSARTAN 80 MG/1
1 TABLET ORAL
Qty: 0 | Refills: 0 | COMMUNITY

## 2018-10-18 RX ORDER — NIFEDIPINE 30 MG
1 TABLET, EXTENDED RELEASE 24 HR ORAL
Qty: 0 | Refills: 0 | COMMUNITY

## 2018-10-18 RX ORDER — HYDROMORPHONE HYDROCHLORIDE 2 MG/ML
1 INJECTION INTRAMUSCULAR; INTRAVENOUS; SUBCUTANEOUS
Qty: 0 | Refills: 0 | COMMUNITY
Start: 2018-10-18

## 2018-10-18 RX ORDER — METOPROLOL TARTRATE 50 MG
0 TABLET ORAL
Qty: 0 | Refills: 0 | COMMUNITY

## 2018-10-18 RX ORDER — FERROUS SULFATE 325(65) MG
325 TABLET ORAL DAILY
Qty: 0 | Refills: 0 | Status: DISCONTINUED | OUTPATIENT
Start: 2018-10-18 | End: 2018-10-18

## 2018-10-18 RX ORDER — ACETAMINOPHEN 500 MG
2 TABLET ORAL
Qty: 0 | Refills: 0 | COMMUNITY
Start: 2018-10-18

## 2018-10-18 RX ORDER — AMLODIPINE BESYLATE 2.5 MG/1
0 TABLET ORAL
Qty: 0 | Refills: 0 | COMMUNITY

## 2018-10-18 RX ORDER — PANTOPRAZOLE SODIUM 20 MG/1
1 TABLET, DELAYED RELEASE ORAL
Qty: 0 | Refills: 0 | COMMUNITY
Start: 2018-10-18

## 2018-10-18 RX ORDER — LABETALOL HCL 100 MG
1 TABLET ORAL
Qty: 0 | Refills: 0 | COMMUNITY
Start: 2018-10-18

## 2018-10-18 RX ADMIN — HYDROMORPHONE HYDROCHLORIDE 1 MILLIGRAM(S): 2 INJECTION INTRAMUSCULAR; INTRAVENOUS; SUBCUTANEOUS at 19:27

## 2018-10-18 RX ADMIN — HYDROMORPHONE HYDROCHLORIDE 1 MILLIGRAM(S): 2 INJECTION INTRAMUSCULAR; INTRAVENOUS; SUBCUTANEOUS at 18:57

## 2018-10-18 RX ADMIN — Medication 650 MILLIGRAM(S): at 05:47

## 2018-10-18 RX ADMIN — POLYETHYLENE GLYCOL 3350 17 GRAM(S): 17 POWDER, FOR SOLUTION ORAL at 05:23

## 2018-10-18 RX ADMIN — HYDROMORPHONE HYDROCHLORIDE 1 MILLIGRAM(S): 2 INJECTION INTRAMUSCULAR; INTRAVENOUS; SUBCUTANEOUS at 09:55

## 2018-10-18 RX ADMIN — Medication 100 MILLIGRAM(S): at 05:10

## 2018-10-18 RX ADMIN — Medication 0.1 MILLIGRAM(S): at 05:10

## 2018-10-18 RX ADMIN — HEPARIN SODIUM 5000 UNIT(S): 5000 INJECTION INTRAVENOUS; SUBCUTANEOUS at 05:10

## 2018-10-18 RX ADMIN — HYDROMORPHONE HYDROCHLORIDE 1 MILLIGRAM(S): 2 INJECTION INTRAMUSCULAR; INTRAVENOUS; SUBCUTANEOUS at 09:25

## 2018-10-18 RX ADMIN — Medication 100 MILLIGRAM(S): at 13:44

## 2018-10-18 RX ADMIN — Medication 0.1 MILLIGRAM(S): at 02:02

## 2018-10-18 RX ADMIN — HEPARIN SODIUM 5000 UNIT(S): 5000 INJECTION INTRAVENOUS; SUBCUTANEOUS at 17:00

## 2018-10-18 RX ADMIN — PANTOPRAZOLE SODIUM 40 MILLIGRAM(S): 20 TABLET, DELAYED RELEASE ORAL at 11:22

## 2018-10-18 RX ADMIN — Medication 100 MILLIGRAM(S): at 05:11

## 2018-10-18 RX ADMIN — Medication 81 MILLIGRAM(S): at 11:22

## 2018-10-18 RX ADMIN — Medication 650 MILLIGRAM(S): at 05:19

## 2018-10-18 RX ADMIN — Medication 0.1 MILLIGRAM(S): at 17:00

## 2018-10-18 NOTE — PROGRESS NOTE ADULT - PROBLEM SELECTOR PLAN 4
Fluid restriction 1500cc  Nephrology following  will do every other day  well controlled plan dc in 24-48hours

## 2018-10-18 NOTE — DISCHARGE NOTE ADULT - PLAN OF CARE
decrease risk of secondary cva patient on aspirin but refuses statin systolic between 140s-180s clonidine 0.1mg at 6am and 6pm  labetalol 100mg at 8am and 8pm

## 2018-10-18 NOTE — PROGRESS NOTE ADULT - PROVIDER SPECIALTY LIST ADULT
Cardiology
Critical Care
Endocrinology
Hospitalist
Nephrology
Neurology
Neurology
Rehab Medicine
Cardiology
Critical Care

## 2018-10-18 NOTE — PROGRESS NOTE ADULT - SUBJECTIVE AND OBJECTIVE BOX
Patient is a 89y old  Female who presents with a chief complaint of Weakness (17 Oct 2018 18:41)      Patient seen and examined at bedside.    ALLERGIES:  ACE inhibitors (Other)  hydrALAZINE (Other)    MEDICATIONS:  acetaminophen   Tablet .. 650 milliGRAM(s) Oral every 6 hours PRN  aluminum hydroxide/magnesium hydroxide/simethicone Suspension 30 milliLiter(s) Oral every 6 hours PRN  aspirin  chewable 81 milliGRAM(s) Oral daily  cloNIDine 0.1 milliGRAM(s) Oral every 12 hours  docusate sodium 100 milliGRAM(s) Oral three times a day  heparin  Injectable 5000 Unit(s) SubCutaneous every 12 hours  HYDROmorphone   Tablet 1 milliGRAM(s) Oral every 4 hours PRN  labetalol 100 milliGRAM(s) Oral every 12 hours  pantoprazole    Tablet 40 milliGRAM(s) Oral daily  polyethylene glycol 3350 17 Gram(s) Oral two times a day  pravastatin 20 milliGRAM(s) Oral at bedtime  senna 2 Tablet(s) Oral at bedtime    Vital Signs Last 24 Hrs  T(F): 98 (18 Oct 2018 12:43), Max: 98.7 (18 Oct 2018 01:49)  HR: 56 (18 Oct 2018 12:43) (56 - 70)  BP: 143/55 (18 Oct 2018 12:43) (124/49 - 196/53)  RR: 14 (18 Oct 2018 12:43) (14 - 14)  SpO2: 96% (18 Oct 2018 12:43) (94% - 98%)  I&O's Summary    17 Oct 2018 07:01  -  18 Oct 2018 07:00  --------------------------------------------------------  IN: 600 mL / OUT: 2 mL / NET: 598 mL    18 Oct 2018 07:01  -  18 Oct 2018 14:56  --------------------------------------------------------  IN: 720 mL / OUT: 300 mL / NET: 420 mL        PHYSICAL EXAM:  General: NAD, A/O x 3  ENT: MMM  Neck: Supple, No JVD  Lungs: Clear to auscultation bilaterally  Cardio: RRR, S1/S2, No murmurs  Abdomen: Soft, Nontender, Nondistended; Bowel sounds present  Extremities: No cyanosis, No edema    LABS:                        9.9    6.6   )-----------( 232      ( 17 Oct 2018 06:30 )             29.9     10-18    130  |  98  |  33  ----------------------------<  106  4.6   |  25  |  1.42    Ca    9.1      18 Oct 2018 06:10      eGFR if Non African American: 32 mL/min/1.73M2 (10-18-18 @ 06:10)  eGFR if African American: 37 mL/min/1.73M2 (10-18-18 @ 06:10)            10-06 Chol 185 mg/dL LDL 77 mg/dL HDL 92 mg/dL Trig 81 mg/dL        CAPILLARY BLOOD GLUCOSE        10-06 [id number] 5.7          RADIOLOGY & ADDITIONAL TESTS:    Care Discussed with Consultants/Other Providers: Patient is a 89y old  Female who presents with a chief complaint of Weakness (17 Oct 2018 18:41)      Patient seen and examined at bedside.    ALLERGIES:  ACE inhibitors (Other)  hydrALAZINE (Other)    MEDICATIONS:  acetaminophen   Tablet .. 650 milliGRAM(s) Oral every 6 hours PRN  aluminum hydroxide/magnesium hydroxide/simethicone Suspension 30 milliLiter(s) Oral every 6 hours PRN  aspirin  chewable 81 milliGRAM(s) Oral daily  cloNIDine 0.1 milliGRAM(s) Oral every 12 hours  docusate sodium 100 milliGRAM(s) Oral three times a day  heparin  Injectable 5000 Unit(s) SubCutaneous every 12 hours  HYDROmorphone   Tablet 1 milliGRAM(s) Oral every 4 hours PRN  labetalol 100 milliGRAM(s) Oral every 12 hours  pantoprazole    Tablet 40 milliGRAM(s) Oral daily  polyethylene glycol 3350 17 Gram(s) Oral two times a day  pravastatin 20 milliGRAM(s) Oral at bedtime  senna 2 Tablet(s) Oral at bedtime    Vital Signs Last 24 Hrs  T(F): 98 (18 Oct 2018 12:43), Max: 98.7 (18 Oct 2018 01:49)  HR: 56 (18 Oct 2018 12:43) (56 - 70)  BP: 143/55 (18 Oct 2018 12:43) (124/49 - 196/53)  RR: 14 (18 Oct 2018 12:43) (14 - 14)  SpO2: 96% (18 Oct 2018 12:43) (94% - 98%)  I&O's Summary    17 Oct 2018 07:01  -  18 Oct 2018 07:00  --------------------------------------------------------  IN: 600 mL / OUT: 2 mL / NET: 598 mL    18 Oct 2018 07:01  -  18 Oct 2018 14:56  --------------------------------------------------------  IN: 720 mL / OUT: 300 mL / NET: 420 mL        PHYSICAL EXAM:  General: NAD, A/O x 3  ENT: MMM  Neck: Supple, No JVD  Lungs: Clear to auscultation bilaterally  Cardio: RRR, S1/S2, No murmurs  Abdomen: Soft, Nontender, Nondistended; Bowel sounds present  Extremities: No cyanosis, No edema, atrophy    LABS:                        9.9    6.6   )-----------( 232      ( 17 Oct 2018 06:30 )             29.9     10-18    130  |  98  |  33  ----------------------------<  106  4.6   |  25  |  1.42    Ca    9.1      18 Oct 2018 06:10      eGFR if Non African American: 32 mL/min/1.73M2 (10-18-18 @ 06:10)  eGFR if African American: 37 mL/min/1.73M2 (10-18-18 @ 06:10)            10-06 Chol 185 mg/dL LDL 77 mg/dL HDL 92 mg/dL Trig 81 mg/dL        CAPILLARY BLOOD GLUCOSE        10-06 [id number] 5.7          RADIOLOGY & ADDITIONAL TESTS:    Care Discussed with Consultants/Other Providers:

## 2018-10-18 NOTE — PROGRESS NOTE ADULT - PROBLEM SELECTOR PLAN 3
Nephrology wants systolic 140s -180s  on labetalol spread through the day  Clonidine bid staggered with the labetalol  Elevated am BP - plan to move PM labetalol from 6pm to 8pm
Continue to monitor Na+ 129
Nephrology wants systolic 140s -180s  on labetalol spread through the day  Clonidine
monitor
-latest  from 128. receiving D5 NS at 40 cc hr and already received hypertonic saline.  -will continue to trend q 6 hours. goal is increase of 6 meq in a 24 hour period with caution not to overcorrect as patient at risk for CPM  -nephrology following
Nephrology wants systolic 140s -180s  on labetalol spread through the day  Clonidine  Elevated am BP - plan to move PM labetalol from 6pm to 8pm
Nephrology wants systolic 140s -180s  on labetalol spread through the day  Clonidine
monitor
Repeat CT w/ evolving infarcts. Needs PT/ OT  Keep NPO, needs speech swallow eval  Pt w/ poor prognosis, needs GOC discussions and palliative care consult

## 2018-10-18 NOTE — PROGRESS NOTE ADULT - SUBJECTIVE AND OBJECTIVE BOX
Awake, appetite fair    Vital Signs Last 24 Hrs  T(C): 36.4 (10-18-18 @ 16:45), Max: 37.1 (10-18-18 @ 01:49)  T(F): 97.6 (10-18-18 @ 16:45), Max: 98.7 (10-18-18 @ 01:49)  HR: 56 (10-18-18 @ 16:45) (56 - 70)  BP: 155/67 (10-18-18 @ 16:45)  RR: 16 (10-18-18 @ 16:45) (14 - 16)  SpO2: 97% (10-18-18 @ 16:45) (94% - 98%)    Gen no distress  Lungs good air entry b/l  Abd soft, ND  Extr no edema  Skin no rash                                                                                    9.9    6.6   )-----------( 232      ( 17 Oct 2018 06:30 )             29.9     18 Oct 2018 06:10    130    |  98     |  33     ----------------------------<  106    4.6     |  25     |  1.42     Ca    9.1        18 Oct 2018 06:10    acetaminophen   Tablet .. 650 milliGRAM(s) Oral every 6 hours PRN  aluminum hydroxide/magnesium hydroxide/simethicone Suspension 30 milliLiter(s) Oral every 6 hours PRN  aspirin  chewable 81 milliGRAM(s) Oral daily  cloNIDine 0.1 milliGRAM(s) Oral <User Schedule>  docusate sodium 100 milliGRAM(s) Oral three times a day  ferrous    sulfate 325 milliGRAM(s) Oral daily  heparin  Injectable 5000 Unit(s) SubCutaneous every 12 hours  HYDROmorphone   Tablet 1 milliGRAM(s) Oral every 4 hours PRN  labetalol 100 milliGRAM(s) Oral <User Schedule>  pantoprazole    Tablet 40 milliGRAM(s) Oral daily  polyethylene glycol 3350 17 Gram(s) Oral two times a day  pravastatin 20 milliGRAM(s) Oral at bedtime  senna 2 Tablet(s) Oral at bedtime    A/P:    S/p CVA  CKD III at baseline  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  D/w medical team

## 2018-10-18 NOTE — DISCHARGE NOTE ADULT - MEDICATION SUMMARY - MEDICATIONS TO TAKE
I will START or STAY ON the medications listed below when I get home from the hospital:    acetaminophen 325 mg oral tablet  -- 2 tab(s) by mouth every 6 hours, As needed, Temp greater or equal to 38C (100.4F), Moderate Pain (4 - 6)  -- Indication: For Pain    HYDROmorphone  -- 1 milligram(s) by mouth every 4 hours, As Needed  -- Indication: For Pain    aspirin 81 mg oral tablet, chewable  -- 1 tab(s) by mouth once a day  -- Indication: For Stroke prevention    aluminum hydroxide-magnesium hydroxide 200 mg-200 mg/5 mL oral suspension  -- 30 milliliter(s) by mouth every 6 hours, As needed, Dyspepsia  -- Indication: For Constipation    cloNIDine 0.1 mg oral tablet  -- 1 tab(s) by mouth   -- Indication: For blood pressure    labetalol 100 mg oral tablet  -- 1 tab(s) by mouth   -- Indication: For blood pressure    FeroSul 325 mg (65 mg elemental iron) oral tablet  -- 1 tab(s) by mouth 3 times a day  -- Indication: For Anemia    docusate sodium 100 mg oral capsule  -- 1 cap(s) by mouth 3 times a day  -- Indication: For Constipation    polyethylene glycol 3350 oral powder for reconstitution  -- 17 gram(s) by mouth 2 times a day, As Needed  -- Indication: For Constipation    senna oral tablet  -- 2 tab(s) by mouth once a day (at bedtime), As Needed  -- Indication: For Constipation    pantoprazole 40 mg oral delayed release tablet  -- 1 tab(s) by mouth once a day  -- Indication: For Reflux

## 2018-10-18 NOTE — PROGRESS NOTE ADULT - ASSESSMENT
89 year old with PMH of HTN, AVR, CKD-3, bilateral cardiotomy admitted to [facility] with a new Right sided MCA ischemic stroke on 10/5/18.  During this admission patient developed SIADH and hyponatremia     One-to-one cancelled after risk benefit analysis by hospital administration 10/16    Family agree with discharge today.  Patient seen by rehab therapy- recommend sub-acute.

## 2018-10-18 NOTE — PROGRESS NOTE ADULT - PROBLEM SELECTOR PROBLEM 1
Acute CVA (cerebrovascular accident)
Hyponatremia
Weakness of left arm
Acute CVA (cerebrovascular accident)
Encephalopathy
Weakness of left arm
Weakness of left arm
Acute CVA (cerebrovascular accident)
Weakness of left arm

## 2018-10-18 NOTE — DISCHARGE NOTE ADULT - CARE PLAN
Principal Discharge DX:	Cerebrovascular accident (CVA), unspecified mechanism  Goal:	decrease risk of secondary cva  Assessment and plan of treatment:	patient on aspirin but refuses statin  Secondary Diagnosis:	Hypertension, unspecified type  Goal:	systolic between 140s-180s  Assessment and plan of treatment:	clonidine 0.1mg at 6am and 6pm  labetalol 100mg at 8am and 8pm

## 2018-10-18 NOTE — PROGRESS NOTE ADULT - PROBLEM SELECTOR PROBLEM 4
Hyponatremia
DANIELLA (acute kidney injury)
DANIELLA (acute kidney injury)
Hyponatremia
DANIELLA (acute kidney injury)

## 2018-10-18 NOTE — DISCHARGE NOTE ADULT - MEDICATION SUMMARY - MEDICATIONS TO CHANGE
I will SWITCH the dose or number of times a day I take the medications listed below when I get home from the hospital:    Toprol-XL 50 mg oral tablet, extended release  -- orally every 12 hours    Procardia XL 30 mg oral tablet, extended release  -- 1 tab(s) by mouth once a day    Norvasc 2.5 mg oral tablet  -- orally every 12 hours

## 2018-10-18 NOTE — PROGRESS NOTE ADULT - REASON FOR ADMISSION
Weakness
CVA
Weakness
cva
Weakness

## 2018-10-18 NOTE — PROGRESS NOTE ADULT - PROBLEM SELECTOR PROBLEM 3
Hypertension, unspecified type
Hypertension, unspecified type
Hyponatremia
Hyponatremia
Hypertension, unspecified type
Hypertension, unspecified type
Hyponatremia
Acute CVA (cerebrovascular accident)
Hyponatremia

## 2018-10-18 NOTE — DISCHARGE NOTE ADULT - HOSPITAL COURSE
Came in with new onset CVA.  Developed SIADH with hyponatermia.  Was successfully treated.  Blood pressure also treated.

## 2018-10-18 NOTE — DISCHARGE NOTE ADULT - ADDITIONAL INSTRUCTIONS
Dr. Dutta on 10/22 at 12:40pm Dr. Dutta on 10/22 at 12:40pm    Take BMP every 3rd day for assessment of sodium

## 2018-10-18 NOTE — DISCHARGE NOTE ADULT - PATIENT PORTAL LINK FT
You can access the [facility] Patient Portal, offered by [facility], by registering with the following website: [email or web address]

## 2018-10-22 ENCOUNTER — APPOINTMENT (OUTPATIENT)
Dept: FAMILY MEDICINE | Facility: CLINIC | Age: 83
End: 2018-10-22

## 2018-10-22 ENCOUNTER — OTHER (OUTPATIENT)
Age: 83
End: 2018-10-22

## 2018-11-11 PROCEDURE — 70450 CT HEAD/BRAIN W/O DYE: CPT

## 2018-11-11 PROCEDURE — 82803 BLOOD GASES ANY COMBINATION: CPT

## 2018-11-11 PROCEDURE — 97530 THERAPEUTIC ACTIVITIES: CPT

## 2018-11-11 PROCEDURE — 97116 GAIT TRAINING THERAPY: CPT

## 2018-11-11 PROCEDURE — 93005 ELECTROCARDIOGRAM TRACING: CPT

## 2018-11-11 PROCEDURE — 83001 ASSAY OF GONADOTROPIN (FSH): CPT

## 2018-11-11 PROCEDURE — 84300 ASSAY OF URINE SODIUM: CPT

## 2018-11-11 PROCEDURE — 93306 TTE W/DOPPLER COMPLETE: CPT

## 2018-11-11 PROCEDURE — 97161 PT EVAL LOW COMPLEX 20 MIN: CPT

## 2018-11-11 PROCEDURE — 83605 ASSAY OF LACTIC ACID: CPT

## 2018-11-11 PROCEDURE — 83735 ASSAY OF MAGNESIUM: CPT

## 2018-11-11 PROCEDURE — 92526 ORAL FUNCTION THERAPY: CPT

## 2018-11-11 PROCEDURE — 83525 ASSAY OF INSULIN: CPT

## 2018-11-11 PROCEDURE — 76775 US EXAM ABDO BACK WALL LIM: CPT

## 2018-11-11 PROCEDURE — 85730 THROMBOPLASTIN TIME PARTIAL: CPT

## 2018-11-11 PROCEDURE — 84550 ASSAY OF BLOOD/URIC ACID: CPT

## 2018-11-11 PROCEDURE — 80061 LIPID PANEL: CPT

## 2018-11-11 PROCEDURE — 83002 ASSAY OF GONADOTROPIN (LH): CPT

## 2018-11-11 PROCEDURE — 85027 COMPLETE CBC AUTOMATED: CPT

## 2018-11-11 PROCEDURE — 83036 HEMOGLOBIN GLYCOSYLATED A1C: CPT

## 2018-11-11 PROCEDURE — 80053 COMPREHEN METABOLIC PANEL: CPT

## 2018-11-11 PROCEDURE — 99291 CRITICAL CARE FIRST HOUR: CPT | Mod: 25

## 2018-11-11 PROCEDURE — 84443 ASSAY THYROID STIM HORMONE: CPT

## 2018-11-11 PROCEDURE — 82550 ASSAY OF CK (CPK): CPT

## 2018-11-11 PROCEDURE — 80076 HEPATIC FUNCTION PANEL: CPT

## 2018-11-11 PROCEDURE — 76770 US EXAM ABDO BACK WALL COMP: CPT

## 2018-11-11 PROCEDURE — 71045 X-RAY EXAM CHEST 1 VIEW: CPT

## 2018-11-11 PROCEDURE — 84146 ASSAY OF PROLACTIN: CPT

## 2018-11-11 PROCEDURE — 80048 BASIC METABOLIC PNL TOTAL CA: CPT

## 2018-11-11 PROCEDURE — 97110 THERAPEUTIC EXERCISES: CPT

## 2018-11-11 PROCEDURE — 92610 EVALUATE SWALLOWING FUNCTION: CPT

## 2018-11-11 PROCEDURE — 80307 DRUG TEST PRSMV CHEM ANLYZR: CPT

## 2018-11-11 PROCEDURE — 83935 ASSAY OF URINE OSMOLALITY: CPT

## 2018-11-11 PROCEDURE — 82436 ASSAY OF URINE CHLORIDE: CPT

## 2018-11-11 PROCEDURE — 84681 ASSAY OF C-PEPTIDE: CPT

## 2018-11-11 PROCEDURE — 36600 WITHDRAWAL OF ARTERIAL BLOOD: CPT

## 2018-11-11 PROCEDURE — 93970 EXTREMITY STUDY: CPT

## 2018-11-11 PROCEDURE — 85610 PROTHROMBIN TIME: CPT

## 2018-11-11 PROCEDURE — 82533 TOTAL CORTISOL: CPT

## 2018-11-11 PROCEDURE — 84100 ASSAY OF PHOSPHORUS: CPT

## 2018-11-11 PROCEDURE — 82570 ASSAY OF URINE CREATININE: CPT

## 2018-11-11 PROCEDURE — 82024 ASSAY OF ACTH: CPT

## 2018-11-11 PROCEDURE — 95812 EEG 41-60 MINUTES: CPT

## 2018-11-11 PROCEDURE — 93880 EXTRACRANIAL BILAT STUDY: CPT

## 2018-11-11 PROCEDURE — 82962 GLUCOSE BLOOD TEST: CPT

## 2018-11-11 PROCEDURE — G0480: CPT

## 2019-01-02 ENCOUNTER — OTHER (OUTPATIENT)
Age: 84
End: 2019-01-02

## 2019-01-02 RX ORDER — ALBUTEROL SULFATE 90 UG/1
108 (90 BASE) AEROSOL, METERED RESPIRATORY (INHALATION)
Qty: 1 | Refills: 3 | Status: ACTIVE | COMMUNITY
Start: 2019-01-02 | End: 1900-01-01

## 2019-01-08 RX ORDER — ALBUTEROL SULFATE 2.5 MG/3ML
(2.5 MG/3ML) SOLUTION RESPIRATORY (INHALATION)
Qty: 1 | Refills: 3 | Status: ACTIVE | COMMUNITY
Start: 2019-01-08 | End: 1900-01-01

## 2019-03-12 NOTE — CONSULT NOTE ADULT - ATTENDING COMMENTS
110 min were spent doing consult - start 11:20 AM end 1:10 PM, > 50% spent at bedside examining patient, taking pain history, and discussion of management with patient and dtr. chills/sweating/weakness/fatigue

## 2019-05-07 ENCOUNTER — APPOINTMENT (OUTPATIENT)
Dept: FAMILY MEDICINE | Facility: CLINIC | Age: 84
End: 2019-05-07
Payer: MEDICARE

## 2019-05-07 VITALS
HEART RATE: 61 BPM | RESPIRATION RATE: 12 BRPM | DIASTOLIC BLOOD PRESSURE: 82 MMHG | OXYGEN SATURATION: 96 % | HEIGHT: 64 IN | SYSTOLIC BLOOD PRESSURE: 130 MMHG

## 2019-05-07 DIAGNOSIS — I63.511 CEREBRAL INFARCTION DUE TO UNSPECIFIED OCCLUSION OR STENOSIS OF RIGHT MIDDLE CEREBRAL ARTERY: ICD-10-CM

## 2019-05-07 DIAGNOSIS — R26.9 UNSPECIFIED ABNORMALITIES OF GAIT AND MOBILITY: ICD-10-CM

## 2019-05-07 PROCEDURE — 99214 OFFICE O/P EST MOD 30 MIN: CPT

## 2019-05-07 RX ORDER — VALSARTAN 320 MG/1
320 TABLET, COATED ORAL DAILY
Refills: 0 | Status: DISCONTINUED | COMMUNITY
Start: 2018-10-02 | End: 2019-05-07

## 2019-05-07 RX ORDER — CHROMIUM 200 MCG
TABLET ORAL
Refills: 0 | Status: ACTIVE | COMMUNITY

## 2019-05-07 RX ORDER — ACETAMINOPHEN AND PHENYLEPHRINE HYDROCHLORIDE 325; 5 MG/1; MG/1
TABLET, COATED ORAL
Refills: 0 | Status: ACTIVE | COMMUNITY

## 2019-05-07 RX ORDER — DIAZEPAM 5 MG/1
5 TABLET ORAL
Refills: 0 | Status: ACTIVE | COMMUNITY

## 2019-05-07 RX ORDER — NIFEDIPINE 30 MG/1
30 TABLET, FILM COATED, EXTENDED RELEASE ORAL
Refills: 0 | Status: DISCONTINUED | COMMUNITY
Start: 2018-10-02 | End: 2019-05-07

## 2019-05-07 RX ORDER — GABAPENTIN 100 MG/1
100 CAPSULE ORAL
Qty: 60 | Refills: 0 | Status: ACTIVE | COMMUNITY
Start: 2019-05-07 | End: 1900-01-01

## 2019-05-07 RX ORDER — LOSARTAN POTASSIUM 50 MG/1
50 TABLET, FILM COATED ORAL
Qty: 180 | Refills: 3 | Status: ACTIVE | COMMUNITY
Start: 2019-05-07 | End: 1900-01-01

## 2019-05-07 RX ORDER — ACETAMINOPHEN 325 MG/1
TABLET, FILM COATED ORAL
Refills: 0 | Status: ACTIVE | COMMUNITY

## 2019-05-07 RX ORDER — LABETALOL HYDROCHLORIDE 200 MG/1
200 TABLET, FILM COATED ORAL TWICE DAILY
Qty: 60 | Refills: 3 | Status: ACTIVE | COMMUNITY
Start: 2019-05-07 | End: 1900-01-01

## 2019-05-07 RX ORDER — DOCUSATE SODIUM 100 MG/1
100 CAPSULE ORAL
Refills: 0 | Status: ACTIVE | COMMUNITY

## 2019-05-07 RX ORDER — METOPROLOL SUCCINATE 50 MG/1
50 TABLET, EXTENDED RELEASE ORAL
Qty: 180 | Refills: 0 | Status: DISCONTINUED | COMMUNITY
Start: 2017-04-04 | End: 2019-05-07

## 2019-05-07 RX ORDER — LABETALOL HYDROCHLORIDE 300 MG/1
300 TABLET, FILM COATED ORAL
Qty: 30 | Refills: 0 | Status: ACTIVE | COMMUNITY
Start: 2019-05-07 | End: 1900-01-01

## 2019-05-07 RX ORDER — ALBUTEROL SULFATE 2.5 MG/3ML
(2.5 MG/3ML) SOLUTION RESPIRATORY (INHALATION)
Refills: 0 | Status: ACTIVE | COMMUNITY

## 2019-05-07 RX ORDER — LABETALOL HYDROCHLORIDE 200 MG/1
200 TABLET, FILM COATED ORAL
Refills: 0 | Status: DISCONTINUED | COMMUNITY
End: 2019-05-07

## 2019-05-07 RX ORDER — LIDOCAINE 50 MG/G
5 PATCH CUTANEOUS
Qty: 1 | Refills: 0 | Status: DISCONTINUED | COMMUNITY
Start: 2017-08-31 | End: 2019-05-07

## 2019-05-10 PROBLEM — R26.9 GAIT DISTURBANCE: Status: ACTIVE | Noted: 2017-08-31

## 2019-05-10 NOTE — HISTORY OF PRESENT ILLNESS
[FreeTextEntry1] : here for follow up [de-identified] : hsitory of cva, oct 2018, ischemia  mca, labile htn\par seeing neprhology, nanda noguera. [phone]\par recently had labs, 2/2019 daughter reports cbc, cmp wnl\par left arm hemparesis, can ambulate with assistance, but in wheelchair. \par was in [facility] until dec 8. currently not in PT

## 2019-05-10 NOTE — ASSESSMENT
[FreeTextEntry1] : at this time reviewed all medications with daughter\par will obtain consult from nephrologist\par cont current meds to control bp, daughter monitors at home\par daughter has assistance, discussed obtaining additional support/help for mom\par

## 2019-05-10 NOTE — PHYSICAL EXAM
[Well Nourished] : well nourished [No Acute Distress] : no acute distress [Normal Rate] : normal rate  [No Joint Swelling] : no joint swelling [Soft] : abdomen soft

## 2019-05-10 NOTE — HEALTH RISK ASSESSMENT
[No falls in past year] : Patient reported no falls in the past year [Hepatitis C test declined] : Hepatitis C test declined [HIV test declined] : HIV test declined [Spatial Ability and Orientation] : difficulty with spatial ability and orientation [With Significant Other] : lives with significant other [None] : None [] :  [] : No [Change in mental status noted] : No change in mental status noted [Language] : denies difficulty with language [de-identified] : lives with , daughter there living at moment [Handling Complex Tasks] : denies difficulty handling complex tasks

## 2019-06-26 ENCOUNTER — RX RENEWAL (OUTPATIENT)
Age: 84
End: 2019-06-26

## 2019-06-26 DIAGNOSIS — R26.81 UNSTEADINESS ON FEET: ICD-10-CM

## 2019-06-26 DIAGNOSIS — R09.89 OTHER SPECIFIED SYMPTOMS AND SIGNS INVOLVING THE CIRCULATORY AND RESPIRATORY SYSTEMS: ICD-10-CM

## 2019-06-26 DIAGNOSIS — Z96.649 PRESENCE OF UNSPECIFIED ARTIFICIAL HIP JOINT: ICD-10-CM

## 2019-06-26 RX ORDER — CLONIDINE HYDROCHLORIDE 0.1 MG/1
0.1 TABLET ORAL DAILY
Qty: 120 | Refills: 0 | Status: ACTIVE | COMMUNITY
Start: 2019-06-26 | End: 1900-01-01

## 2020-05-17 NOTE — ED PROVIDER NOTE - CROS ED CONS ALL NEG
Concern for fungemia related to permacath;   Per CC pt doesn't want to endure additional procedures  Planning on continuing HD as long as pt perceives a benefit   negative...

## 2020-12-15 PROBLEM — Z87.440 HISTORY OF URINARY TRACT INFECTION: Status: RESOLVED | Noted: 2017-08-31 | Resolved: 2020-12-15

## 2021-02-24 NOTE — PROGRESS NOTE ADULT - SUBJECTIVE AND OBJECTIVE BOX
Patient's wife called and stated the patient would like to know if more blood work is needed before his 3/8/21 appointment with Dr Britton Dickson  He completed labs for Dr Britton Dickson on 1/11/21, but was unsure if more is needed  Follow up for cva ?arrythmia  SUBJ: Patient offers no complaints.   PMH  Hypertension, unspecified type      MEDICATIONS  (STANDING):  aspirin  chewable 81 milliGRAM(s) Oral daily  atorvastatin 40 milliGRAM(s) Oral at bedtime  cloNIDine 0.1 milliGRAM(s) Oral daily  docusate sodium 100 milliGRAM(s) Oral three times a day  heparin  Injectable 5000 Unit(s) SubCutaneous every 12 hours  labetalol 100 milliGRAM(s) Oral every 8 hours  pantoprazole    Tablet 40 milliGRAM(s) Oral daily  polyethylene glycol 3350 17 Gram(s) Oral two times a day  senna 2 Tablet(s) Oral at bedtime  tolvaptan 15 milliGRAM(s) Oral once    MEDICATIONS  (PRN):  acetaminophen   Tablet .. 650 milliGRAM(s) Oral every 6 hours PRN Temp greater or equal to 38C (100.4F), Moderate Pain (4 - 6)  aluminum hydroxide/magnesium hydroxide/simethicone Suspension 30 milliLiter(s) Oral every 4 hours PRN Dyspepsia        PHYSICAL EXAM:  Vital Signs Last 24 Hrs  T(C): 36.8 (15 Oct 2018 09:16), Max: 37 (14 Oct 2018 22:09)  T(F): 98.2 (15 Oct 2018 09:16), Max: 98.6 (14 Oct 2018 22:09)  HR: 65 (15 Oct 2018 09:16) (62 - 72)  BP: 147/72 (15 Oct 2018 09:16) (138/48 - 173/50)  BP(mean): 70 (14 Oct 2018 14:00) (70 - 82)  RR: 16 (15 Oct 2018 09:16) (16 - 21)  SpO2: 93% (15 Oct 2018 09:16) (93% - 98%)    GENERAL: NAD, well-groomed, well-developed  HEAD:  Atraumatic, Normocephalic  EYES: EOMI, PERRLA, conjunctiva and sclera clear  ENT: Moist mucous membranes,  NECK: Supple, No JVD, no bruits  CHEST/LUNG: Clear to percussion bilaterally; No rales, rhonchi, wheezing, or rubs  HEART: Regular rate and rhythm; No murmurs, rubs, or gallops PMI non displaced.  ABDOMEN: Soft, Nontender, Nondistended; Bowel sounds present  EXTREMITIES:  2+ Peripheral Pulses, No clubbing, cyanosis, or edema  SKIN: No rashes or lesions  NERVOUS SYSTEM:  unable to cooperate      TELEMETRY: rsr no af    ECG:    LABS:                        10.0   7.0   )-----------( 185      ( 14 Oct 2018 05:55 )             31.7     10-15    129<L>  |  98  |  31<H>  ----------------------------<  102<H>  5.0   |  26  |  1.40<H>    Ca    9.0      15 Oct 2018 06:20  Phos  4.7     10-14  Mg     1.7     10-14    TPro  6.9  /  Alb  2.7<L>  /  TBili  0.2  /  DBili  0.1  /  AST  26  /  ALT  24  /  AlkPhos  54  10-14    CARDIAC MARKERS ( 14 Oct 2018 16:30 )  x     / x     / 39 U/L / x     / x      CARDIAC MARKERS ( 14 Oct 2018 05:55 )  x     / x     / 33 U/L / x     / x              I&O's Summary    14 Oct 2018 07:01  -  15 Oct 2018 07:00  --------------------------------------------------------  IN: 660 mL / OUT: 1 mL / NET: 659 mL    15 Oct 2018 07:01  -  15 Oct 2018 11:39  --------------------------------------------------------  IN: 200 mL / OUT: 0 mL / NET: 200 mL      BNP    RADIOLOGY & ADDITIONAL STUDIES:    ECHO:

## 2021-05-17 NOTE — ED ADULT NURSE NOTE - PAIN RATING/NUMBER SCALE (0-10): ACTIVITY
Received INR results from Lab.  INR today at 2.0 within INR goal range of 2.0-3.0.  Spoke to patient on phone.  Dosing verified.  States she has been taking TWD of 29mg.  Patient denies any changes that could have impacted the INR.  Reinforced with patient to call with any medication changes as this can impact the INR.  Patient verbalized understanding.  Dr Rosario is in office supervising the anticoagulation clinic.  Note forwarded to physician for review.    Patient was notified that their INR result was within therapeutic range. Patient was instructed to continue taking 29mg TWD.  Next INR is due in 6 weeks via lab.  Order placed.  Patient was instructed to contact us with any unusual bleeding, bruising, any changes in medications, diet or health status and if there are any other questions or concerns. Patient verbalized understanding of above.       5

## 2022-01-07 NOTE — ED PROVIDER NOTE - DISPOSITION TYPE
Biopsy Photograph Reviewed: Yes Consent Type: Consent 1 (Standard) Eye Shield Used: No Surgeon Performing Repair (Optional): Clifford Mtz M.D. Initial Size Of Lesion: 2 X Size Of Lesion In Cm (Optional): 1.1 Number Of Stages: 1 Primary Defect Length In Cm (Final Defect Size - Required For Flaps/Grafts): 2.4 Primary Defect Width In Cm (Final Defect Size - Required For Flaps/Grafts): 1.5 Repair Type: Complex Repair Oculoplastic Surgeon Procedure Text (A): After obtaining clear surgical margins the patient was sent to oculoplastics for surgical repair.  The patient understands they will receive post-surgical care and follow-up from the referring physician's office. Oculoplastic Surgeon Procedure Text (B): After obtaining clear surgical margins the patient was sent to oculoplastics for surgical repair.  The patient understands they will receive post-surgical care and follow-up from the referring physician's office. Otolaryngologist Procedure Text (A): After obtaining clear surgical margins the patient was sent to otolaryngology for surgical repair.  The patient understands they will receive post-surgical care and follow-up from the referring physician's office. Otolaryngologist Procedure Text (B): After obtaining clear surgical margins the patient was sent to otolaryngology for surgical repair.  The patient understands they will receive post-surgical care and follow-up from the referring physician's office. Plastic Surgeon Procedure Text (A): After obtaining clear surgical margins the patient was sent to plastics for surgical repair.  The patient understands they will receive post-surgical care and follow-up from the referring physician's office. Plastic Surgeon Procedure Text (B): After obtaining clear surgical margins the patient was sent to plastics for surgical repair.  The patient understands they will receive post-surgical care and follow-up from the referring physician's office. Mid-Level Procedure Text (A): After obtaining clear surgical margins the patient was sent to a mid-level provider for surgical repair.  The patient understands they will receive post-surgical care and follow-up from the mid-level provider. Mid-Level Procedure Text (B): After obtaining clear surgical margins the patient was sent to a mid-level provider for surgical repair.  The patient understands they will receive post-surgical care and follow-up from the mid-level provider. Provider Procedure Text (A): After obtaining clear surgical margins the defect was repaired by another provider. Asc Procedure Text (A): After obtaining clear surgical margins the patient was sent to an ASC for surgical repair.  The patient understands they will receive post-surgical care and follow-up from the ASC physician. Asc Procedure Text (B): After obtaining clear surgical margins the patient was sent to an ASC for surgical repair.  The patient understands they will receive post-surgical care and follow-up from the ASC physician. Simple / Intermediate / Complex Repair - Final Wound Length In Cm: 5.9 Suturegard Retention Suture: 2-0 Nylon Retention Suture Bite Size: 3 mm Length To Time In Minutes Device Was In Place: 10 Undermining Type: Entire Wound Debridement Text: The wound edges were debrided prior to proceeding with the closure to facilitate wound healing. Helical Rim Text: The closure involved the helical rim. Vermilion Border Text: The closure involved the vermilion border. Nostril Rim Text: The closure involved the nostril rim. Retention Suture Text: Retention sutures were placed to support the closure and prevent dehiscence. Secondary Defect Length In Cm (Required For Flaps): 0 Location Indication Override (Is Already Calculated Based On Selected Body Location): Area L Area H Indication Text: Tumors in this location are included in Area H (eyelids, eyebrows, nose, lips, chin, ear, pre-auricular, post-auricular, temple, genitalia, hands, feet, ankles and areola).  Tissue conservation is critical in these anatomic locations. Area M Indication Text: Tumors in this location are included in Area M (cheek, forehead, scalp, neck, jawline and pretibial skin).  Mohs surgery is indicated for tumors in these anatomic locations. Area L Indication Text: Tumors in this location are included in Area L (trunk and extremities).  Mohs surgery is indicated for larger tumors, or tumors with aggressive histologic features, in these anatomic locations. Tumor Debulked?: curette Depth Of Tumor Invasion (For Histology): tumor not visualized (deep and peripheral margins are clear of tumor) Perineural Invasion (For Histology - Be Specific If Possible): absent Special Stains Stage 1 - Results: Base On Clearance Noted Above Stage 2: Additional Anesthesia Type: 1% lidocaine with epinephrine Include Tumor Staging In Mohs Note?: Please Select the Appropriate Response Staging Info: By selecting yes to the question above you will include information on AJCC 8 tumor staging in your Mohs note. Information on tumor staging will be automatically added for SCCs on the head and neck. AJCC 8 includes tumor size, tumor depth, perineural involvement and bone invasion. Tumor Depth: Less than 6mm from granular layer and no invasion beyond the subcutaneous fat Medical Necessity Statement: Based on my medical judgement, Mohs surgery is the most appropriate treatment for this cancer compared to other treatments. Alternatives Discussed Intro (Do Not Add Period): I discussed alternative treatments to Mohs surgery and specifically discussed the risks and benefits of Consent 1/Introductory Paragraph: The rationale for Mohs was explained to the patient and consent was obtained. The risks, benefits and alternatives to therapy were discussed in detail. Specifically, the risks of infection, scarring, bleeding, prolonged wound healing, incomplete removal, allergy to anesthesia, nerve injury and recurrence were addressed. Prior to the procedure, the treatment site was clearly identified and confirmed by the patient. All components of Universal Protocol/PAUSE Rule completed. Consent 2/Introductory Paragraph: Mohs surgery was explained to the patient and consent was obtained. The risks, benefits and alternatives to therapy were discussed in detail. Specifically, the risks of infection, scarring, bleeding, prolonged wound healing, incomplete removal, allergy to anesthesia, nerve injury and recurrence were addressed. Prior to the procedure, the treatment site was clearly identified and confirmed by the patient. All components of Universal Protocol/PAUSE Rule completed. Consent 3/Introductory Paragraph: I gave the patient a chance to ask questions they had about the procedure.  Following this I explained the Mohs procedure and consent was obtained. The risks, benefits and alternatives to therapy were discussed in detail. Specifically, the risks of infection, scarring, bleeding, prolonged wound healing, incomplete removal, allergy to anesthesia, nerve injury and recurrence were addressed. Prior to the procedure, the treatment site was clearly identified and confirmed by the patient. All components of Universal Protocol/PAUSE Rule completed. Consent (Temporal Branch)/Introductory Paragraph: The rationale for Mohs was explained to the patient and consent was obtained. The risks, benefits and alternatives to therapy were discussed in detail. Specifically, the risks of damage to the temporal branch of the facial nerve, infection, scarring, bleeding, prolonged wound healing, incomplete removal, allergy to anesthesia, and recurrence were addressed. Prior to the procedure, the treatment site was clearly identified and confirmed by the patient. All components of Universal Protocol/PAUSE Rule completed. Consent (Marginal Mandibular)/Introductory Paragraph: The rationale for Mohs was explained to the patient and consent was obtained. The risks, benefits and alternatives to therapy were discussed in detail. Specifically, the risks of damage to the marginal mandibular branch of the facial nerve, infection, scarring, bleeding, prolonged wound healing, incomplete removal, allergy to anesthesia, and recurrence were addressed. Prior to the procedure, the treatment site was clearly identified and confirmed by the patient. All components of Universal Protocol/PAUSE Rule completed. Consent (Spinal Accessory)/Introductory Paragraph: The rationale for Mohs was explained to the patient and consent was obtained. The risks, benefits and alternatives to therapy were discussed in detail. Specifically, the risks of damage to the spinal accessory nerve, infection, scarring, bleeding, prolonged wound healing, incomplete removal, allergy to anesthesia, and recurrence were addressed. Prior to the procedure, the treatment site was clearly identified and confirmed by the patient. All components of Universal Protocol/PAUSE Rule completed. Consent (Near Eyelid Margin)/Introductory Paragraph: The rationale for Mohs was explained to the patient and consent was obtained. The risks, benefits and alternatives to therapy were discussed in detail. Specifically, the risks of ectropion or eyelid deformity, infection, scarring, bleeding, prolonged wound healing, incomplete removal, allergy to anesthesia, nerve injury and recurrence were addressed. Prior to the procedure, the treatment site was clearly identified and confirmed by the patient. All components of Universal Protocol/PAUSE Rule completed. Consent (Ear)/Introductory Paragraph: The rationale for Mohs was explained to the patient and consent was obtained. The risks, benefits and alternatives to therapy were discussed in detail. Specifically, the risks of ear deformity, infection, scarring, bleeding, prolonged wound healing, incomplete removal, allergy to anesthesia, nerve injury and recurrence were addressed. Prior to the procedure, the treatment site was clearly identified and confirmed by the patient. All components of Universal Protocol/PAUSE Rule completed. Consent (Nose)/Introductory Paragraph: The rationale for Mohs was explained to the patient and consent was obtained. The risks, benefits and alternatives to therapy were discussed in detail. Specifically, the risks of nasal deformity, changes in the flow of air through the nose, infection, scarring, bleeding, prolonged wound healing, incomplete removal, allergy to anesthesia, nerve injury and recurrence were addressed. Prior to the procedure, the treatment site was clearly identified and confirmed by the patient. All components of Universal Protocol/PAUSE Rule completed. Consent (Lip)/Introductory Paragraph: The rationale for Mohs was explained to the patient and consent was obtained. The risks, benefits and alternatives to therapy were discussed in detail. Specifically, the risks of lip deformity, changes in the oral aperture, infection, scarring, bleeding, prolonged wound healing, incomplete removal, allergy to anesthesia, nerve injury and recurrence were addressed. Prior to the procedure, the treatment site was clearly identified and confirmed by the patient. All components of Universal Protocol/PAUSE Rule completed. Consent (Scalp)/Introductory Paragraph: The rationale for Mohs was explained to the patient and consent was obtained. The risks, benefits and alternatives to therapy were discussed in detail. Specifically, the risks of changes in hair growth pattern secondary to repair, infection, scarring, bleeding, prolonged wound healing, incomplete removal, allergy to anesthesia, nerve injury and recurrence were addressed. Prior to the procedure, the treatment site was clearly identified and confirmed by the patient. All components of Universal Protocol/PAUSE Rule completed. Detail Level: Detailed Postop Diagnosis: same Surgeon: Clifford Mtz M.D. ADMIT Anesthesia Type: 1% lidocaine with 1:100,000 epinephrine and a 1:10 solution of 8.4% sodium bicarbonate Anesthesia Volume In Cc: 3 Hemostasis: Electrocautery Estimated Blood Loss (Cc): minimal Repair Anesthesia Method: local infiltration Anesthesia Volume In Cc: 5 Brow Lift Text: A midfrontal incision was made medially to the defect to allow access to the tissues just superior to the left eyebrow. Following careful dissection inferiorly in a supraperiosteal plane to the level of the left eyebrow, several 3-0 monocryl sutures were used to resuspend the eyebrow orbicularis oculi muscular unit to the superior frontal bone periosteum. This resulted in an appropriate reapproximation of static eyebrow symmetry and correction of the left brow ptosis. Deep Sutures: 3-0 Vicryl Epidermal Closure: interrupted subcuticular Suturegard Intro: Intraoperative tissue expansion was performed, utilizing the SUTUREGARD device, in order to reduce wound tension. Suturegard Body: The suture ends were repeatedly re-tightened and re-clamped to achieve the desired tissue expansion. Hemigard Intro: Due to skin fragility and wound tension, it was decided to use HEMIGARD adhesive retention suture devices to permit a linear closure. The skin was cleaned and dried for a 6cm distance away from the wound. Excessive hair, if present, was removed to allow for adhesion. Hemigard Postcare Instructions: The HEMIGARD strips are to remain completely dry for at least 5-7 days. Donor Site Anesthesia Type: same as repair anesthesia Epidermal Closure Graft Donor Site (Optional): simple interrupted Graft Donor Site Bandage (Optional-Leave Blank If You Don't Want In Note): Steri-strips and a pressure bandage were applied to the donor site. Closure 2 Information: This tab is for additional flaps and grafts, including complex repair and grafts and complex repair and flaps. You can also specify a different location for the additional defect, if the location is the same you do not need to select a new one. We will insert the automated text for the repair you select below just as we do for solitary flaps and grafts. Please note that at this time if you select a location with a different insurance zone you will need to override the ICD10 and CPT if appropriate. Closure 3 Information: This tab is for additional flaps and grafts above and beyond our usual structured repairs.  Please note if you enter information here it will not currently bill and you will need to add the billing information manually. Closure 4 Information: This tab is for additional flaps and grafts above and beyond our usual structured repairs.  Please note if you enter information here it will not currently bill and you will need to add the billing information manually. Wound Care: Mastisol Dressing: steri-strips and pressure dressing Wound Care (No Sutures): Petrolatum Dressing (No Sutures): dry sterile dressing Unna Boot Text: An Unna boot was placed to help immobilize the limb and facilitate more rapid healing. Home Suture Removal Text: Patient was provided instructions on removing sutures and will remove their sutures at home.  If they have any questions or difficulties they will call the office. Post-Care Instructions: I reviewed with the patient in detail post-care instructions. Patient is not to engage in any heavy lifting, exercise, or swimming for the next 7 days. Should the patient develop any fevers, chills, bleeding, severe pain patient will contact the office immediately. Pain Refusal Text: I offered to prescribe pain medication but the patient refused to take this medication. Mauc Instructions: By selecting yes to the question below the MAUC number will be added into the note.  This will be calculated automatically based on the diagnosis chosen, the size entered, the body zone selected (H,M,L) and the specific indications you chose. You will also have the option to override the Mohs AUC if you disagree with the automatically calculated number and this option is found in the Case Summary tab. Asa Classification: II Time Everyone Was In The Room/Time Out: 1029 Time Procedure Started: 1021 Time Procedure Ended: 1930 Time Patient Discharged: 1120 Where Do You Want The Question To Include Opioid Counseling Located?: Case Summary Tab Eye Protection Verbiage: Before proceeding with the stage, a plastic scleral shield was inserted. The globe was anesthetized with a few drops of 1% lidocaine with 1:100,000 epinephrine. Then, an appropriate sized scleral shield was chosen and coated with lacrilube ointment. The shield was gently inserted and left in place for the duration of each stage. After the stage was completed, the shield was gently removed. Mohs Method Verbiage: An incision at a 45 degree angle following the standard Mohs approach was done and the specimen was harvested as a microscopic controlled layer. Surgeon/Pathologist Verbiage (Will Incorporate Name Of Surgeon From Intro If Not Blank): operated in two distinct and integrated capacities as the surgeon and pathologist. Mohs Histo Method Verbiage: Each section was then chromacoded and processed in the Mohs lab using the Mohs protocol and submitted for frozen section. Subsequent Stages Histo Method Verbiage: Using a similar technique to that described above, a thin layer of tissue was removed from all areas where tumor was visible on the previous stage.  The tissue was again oriented, mapped, dyed, and processed as above. Mohs Rapid Report Verbiage: The area of clinically evident tumor was marked with skin marking ink and appropriately hatched.  The initial incision was made following the Mohs approach through the skin.  The specimen was taken to the lab, divided into the necessary number of pieces, chromacoded and processed according to the Mohs protocol.  This was repeated in successive stages until a tumor free defect was achieved. Complex Repair Preamble Text (Leave Blank If You Do Not Want): The defect edges were debeveled with a #15 scalpel blade. Given the location of the defect a complex repair was deemed most appropriate.  Using a sterile surgical marker, burrow triangles were drawn incorporating the defect and placing the expected incisions within the relaxed skin tension lines where possible.    The area thus outlined was incised to the appropriate tissue plane with a scalpel blade. Extensive wide undermining was performed in all directions to allow proper closure of the defect.  Hemostasis was obtained by cautery. Intermediate Repair Preamble Text (Leave Blank If You Do Not Want): Undermining was performed with blunt dissection. Crescentic Complex Repair Preamble Text (Leave Blank If You Do Not Want): Extensive wide undermining was performed. Non-Graft Cartilage Fenestration Text: The cartilage was fenestrated with a 2mm punch biopsy to help facilitate healing. Graft Cartilage Fenestration Text: The cartilage was fenestrated with a 2mm punch biopsy to help facilitate graft survival and healing. Secondary Intention Text (Leave Blank If You Do Not Want): The defect will heal with secondary intention. No Repair - Repaired With Adjacent Surgical Defect Text (Leave Blank If You Do Not Want): After obtaining clear surgical margins the defect was repaired concurrently with another surgical defect which was in close approximation. Adjacent Tissue Transfer Text: The defect edges were debeveled with a #15 scalpel blade.  Given the location of the defect and the proximity to free margins an adjacent tissue transfer was deemed most appropriate.  Using a sterile surgical marker, an appropriate flap was drawn incorporating the defect and placing the expected incisions within the relaxed skin tension lines where possible.    The area thus outlined was incised deep to adipose tissue with a #15 scalpel blade.  The skin margins were undermined to an appropriate distance in all directions utilizing iris scissors. Advancement Flap (Single) Text: The defect edges were debeveled with a #15 scalpel blade.  Given the location of the defect and the proximity to free margins a single advancement flap was deemed most appropriate.  Using a sterile surgical marker, an appropriate advancement flap was drawn incorporating the defect and placing the expected incisions within the relaxed skin tension lines where possible.    The area thus outlined was incised deep to adipose tissue with a #15 scalpel blade.  The skin margins were undermined to an appropriate distance in all directions utilizing iris scissors. Advancement Flap (Double) Text: The defect edges were debeveled with a #15 scalpel blade.  Given the location of the defect and the proximity to free margins a double advancement flap was deemed most appropriate.  Using a sterile surgical marker, the appropriate advancement flaps were drawn incorporating the defect and placing the expected incisions within the relaxed skin tension lines where possible.    The area thus outlined was incised deep to adipose tissue with a #15 scalpel blade.  The skin margins were undermined to an appropriate distance in all directions utilizing iris scissors. Burow's Advancement Flap Text: The defect edges were debeveled with a #15 scalpel blade.  Given the location of the defect and the proximity to free margins a Burow's advancement flap was deemed most appropriate.  Using a sterile surgical marker, the appropriate advancement flap was drawn incorporating the defect and placing the expected incisions within the relaxed skin tension lines where possible.    The area thus outlined was incised deep to adipose tissue with a #15 scalpel blade.  The skin margins were undermined to an appropriate distance in all directions utilizing iris scissors. Chonodrocutaneous Helical Advancement Flap Text: The defect edges were debeveled with a #15 scalpel blade.  Given the location of the defect and the proximity to free margins a chondrocutaneous helical advancement flap was deemed most appropriate.  Using a sterile surgical marker, the appropriate advancement flap was drawn incorporating the defect and placing the expected incisions within the relaxed skin tension lines where possible.    The area thus outlined was incised deep to adipose tissue with a #15 scalpel blade.  The skin margins were undermined to an appropriate distance in all directions utilizing iris scissors. Crescentic Advancement Flap Text: The defect edges were debeveled with a #15 scalpel blade.  Given the location of the defect and the proximity to free margins a crescentic advancement flap was deemed most appropriate.  Using a sterile surgical marker, the appropriate advancement flap was drawn incorporating the defect and placing the expected incisions within the relaxed skin tension lines where possible.    The area thus outlined was incised deep to adipose tissue with a #15 scalpel blade.  The skin margins were undermined to an appropriate distance in all directions utilizing iris scissors. A-T Advancement Flap Text: The defect edges were debeveled with a #15 scalpel blade.  Given the location of the defect, shape of the defect and the proximity to free margins an A-T advancement flap was deemed most appropriate.  Using a sterile surgical marker, an appropriate advancement flap was drawn incorporating the defect and placing the expected incisions within the relaxed skin tension lines where possible.    The area thus outlined was incised deep to adipose tissue with a #15 scalpel blade.  The skin margins were undermined to an appropriate distance in all directions utilizing iris scissors. O-T Advancement Flap Text: The defect edges were debeveled with a #15 scalpel blade.  Given the location of the defect, shape of the defect and the proximity to free margins an O-T advancement flap was deemed most appropriate.  Using a sterile surgical marker, an appropriate advancement flap was drawn incorporating the defect and placing the expected incisions within the relaxed skin tension lines where possible.    The area thus outlined was incised deep to adipose tissue with a #15 scalpel blade.  The skin margins were undermined to an appropriate distance in all directions utilizing iris scissors. O-L Flap Text: The defect edges were debeveled with a #15 scalpel blade.  Given the location of the defect, shape of the defect and the proximity to free margins an O-L flap was deemed most appropriate.  Using a sterile surgical marker, an appropriate advancement flap was drawn incorporating the defect and placing the expected incisions within the relaxed skin tension lines where possible.    The area thus outlined was incised deep to adipose tissue with a #15 scalpel blade.  The skin margins were undermined to an appropriate distance in all directions utilizing iris scissors. O-Z Flap Text: The defect edges were debeveled with a #15 scalpel blade.  Given the location of the defect, shape of the defect and the proximity to free margins an O-Z flap was deemed most appropriate.  Using a sterile surgical marker, an appropriate transposition flap was drawn incorporating the defect and placing the expected incisions within the relaxed skin tension lines where possible. The area thus outlined was incised deep to adipose tissue with a #15 scalpel blade.  The skin margins were undermined to an appropriate distance in all directions utilizing iris scissors. Double O-Z Flap Text: The defect edges were debeveled with a #15 scalpel blade.  Given the location of the defect, shape of the defect and the proximity to free margins a Double O-Z flap was deemed most appropriate.  Using a sterile surgical marker, an appropriate transposition flap was drawn incorporating the defect and placing the expected incisions within the relaxed skin tension lines where possible. The area thus outlined was incised deep to adipose tissue with a #15 scalpel blade.  The skin margins were undermined to an appropriate distance in all directions utilizing iris scissors. V-Y Flap Text: The defect edges were debeveled with a #15 scalpel blade.  Given the location of the defect, shape of the defect and the proximity to free margins a V-Y flap was deemed most appropriate.  Using a sterile surgical marker, an appropriate advancement flap was drawn incorporating the defect and placing the expected incisions within the relaxed skin tension lines where possible.    The area thus outlined was incised deep to adipose tissue with a #15 scalpel blade.  The skin margins were undermined to an appropriate distance in all directions utilizing iris scissors. Advancement-Rotation Flap Text: The defect edges were debeveled with a #15 scalpel blade.  Given the location of the defect, shape of the defect and the proximity to free margins an advancement-rotation flap was deemed most appropriate.  Using a sterile surgical marker, an appropriate flap was drawn incorporating the defect and placing the expected incisions within the relaxed skin tension lines where possible. The area thus outlined was incised deep to adipose tissue with a #15 scalpel blade.  The skin margins were undermined to an appropriate distance in all directions utilizing iris scissors. Mercedes Flap Text: The defect edges were debeveled with a #15 scalpel blade.  Given the location of the defect, shape of the defect and the proximity to free margins a Mercedes flap was deemed most appropriate.  Using a sterile surgical marker, an appropriate advancement flap was drawn incorporating the defect and placing the expected incisions within the relaxed skin tension lines where possible. The area thus outlined was incised deep to adipose tissue with a #15 scalpel blade.  The skin margins were undermined to an appropriate distance in all directions utilizing iris scissors. Modified Advancement Flap Text: The defect edges were debeveled with a #15 scalpel blade.  Given the location of the defect, shape of the defect and the proximity to free margins a modified advancement flap was deemed most appropriate.  Using a sterile surgical marker, an appropriate advancement flap was drawn incorporating the defect and placing the expected incisions within the relaxed skin tension lines where possible.    The area thus outlined was incised deep to adipose tissue with a #15 scalpel blade.  The skin margins were undermined to an appropriate distance in all directions utilizing iris scissors. Mucosal Advancement Flap Text: Given the location of the defect, shape of the defect and the proximity to free margins a mucosal advancement flap was deemed most appropriate. Incisions were made with a 15 blade scalpel in the appropriate fashion along the cutaneous vermilion border and the mucosal lip. The remaining actinically damaged mucosal tissue was excised.  The mucosal advancement flap was then elevated to the gingival sulcus with care taken to preserve the neurovascular structures and advanced into the primary defect. Care was taken to ensure that precise realignment of the vermilion border was achieved. Peng Advancement Flap Text: The defect edges were debeveled with a #15 scalpel blade.  Given the location of the defect, shape of the defect and the proximity to free margins a Peng advancement flap was deemed most appropriate.  Using a sterile surgical marker, an appropriate advancement flap was drawn incorporating the defect and placing the expected incisions within the relaxed skin tension lines where possible. The area thus outlined was incised deep to adipose tissue with a #15 scalpel blade.  The skin margins were undermined to an appropriate distance in all directions utilizing iris scissors. Hatchet Flap Text: The defect edges were debeveled with a #15 scalpel blade.  Given the location of the defect, shape of the defect and the proximity to free margins a hatchet flap was deemed most appropriate.  Using a sterile surgical marker, an appropriate hatchet flap was drawn incorporating the defect and placing the expected incisions within the relaxed skin tension lines where possible.    The area thus outlined was incised deep to adipose tissue with a #15 scalpel blade.  The skin margins were undermined to an appropriate distance in all directions utilizing iris scissors. Rotation Flap Text: The defect edges were debeveled with a #15 scalpel blade.  Given the location of the defect, shape of the defect and the proximity to free margins a rotation flap was deemed most appropriate.  Using a sterile surgical marker, an appropriate rotation flap was drawn incorporating the defect and placing the expected incisions within the relaxed skin tension lines where possible.    The area thus outlined was incised deep to adipose tissue with a #15 scalpel blade.  The skin margins were undermined to an appropriate distance in all directions utilizing iris scissors. Spiral Flap Text: The defect edges were debeveled with a #15 scalpel blade.  Given the location of the defect, shape of the defect and the proximity to free margins a spiral flap was deemed most appropriate.  Using a sterile surgical marker, an appropriate rotation flap was drawn incorporating the defect and placing the expected incisions within the relaxed skin tension lines where possible. The area thus outlined was incised deep to adipose tissue with a #15 scalpel blade.  The skin margins were undermined to an appropriate distance in all directions utilizing iris scissors. Staged Advancement Flap Text: The defect edges were debeveled with a #15 scalpel blade.  Given the location of the defect, shape of the defect and the proximity to free margins a staged advancement flap was deemed most appropriate.  Using a sterile surgical marker, an appropriate advancement flap was drawn incorporating the defect and placing the expected incisions within the relaxed skin tension lines where possible. The area thus outlined was incised deep to adipose tissue with a #15 scalpel blade.  The skin margins were undermined to an appropriate distance in all directions utilizing iris scissors. Star Wedge Flap Text: The defect edges were debeveled with a #15 scalpel blade.  Given the location of the defect, shape of the defect and the proximity to free margins a star wedge flap was deemed most appropriate.  Using a sterile surgical marker, an appropriate rotation flap was drawn incorporating the defect and placing the expected incisions within the relaxed skin tension lines where possible. The area thus outlined was incised deep to adipose tissue with a #15 scalpel blade.  The skin margins were undermined to an appropriate distance in all directions utilizing iris scissors. Transposition Flap Text: The defect edges were debeveled with a #15 scalpel blade.  Given the location of the defect and the proximity to free margins a transposition flap was deemed most appropriate.  Using a sterile surgical marker, an appropriate transposition flap was drawn incorporating the defect.    The area thus outlined was incised deep to adipose tissue with a #15 scalpel blade.  The skin margins were undermined to an appropriate distance in all directions utilizing iris scissors. Muscle Hinge Flap Text: The defect edges were debeveled with a #15 scalpel blade.  Given the size, depth and location of the defect and the proximity to free margins a muscle hinge flap was deemed most appropriate.  Using a sterile surgical marker, an appropriate hinge flap was drawn incorporating the defect. The area thus outlined was incised with a #15 scalpel blade.  The skin margins were undermined to an appropriate distance in all directions utilizing iris scissors. Mustarde Flap Text: The defect edges were debeveled with a #15 scalpel blade.  Given the size, depth and location of the defect and the proximity to free margins a Mustarde flap was deemed most appropriate.  Using a sterile surgical marker, an appropriate flap was drawn incorporating the defect. The area thus outlined was incised with a #15 scalpel blade.  The skin margins were undermined to an appropriate distance in all directions utilizing iris scissors. Nasal Turnover Hinge Flap Text: The defect edges were debeveled with a #15 scalpel blade.  Given the size, depth, location of the defect and the defect being full thickness a nasal turnover hinge flap was deemed most appropriate.  Using a sterile surgical marker, an appropriate hinge flap was drawn incorporating the defect. The area thus outlined was incised with a #15 scalpel blade. The flap was designed to recreate the nasal mucosal lining and the alar rim. The skin margins were undermined to an appropriate distance in all directions utilizing iris scissors. Nasalis-Muscle-Based Myocutaneous Island Pedicle Flap Text: Using a #15 blade, an incision was made around the donor flap to the level of the nasalis muscle. Wide lateral undermining was then performed in both the subcutaneous plane above the nasalis muscle, and in a submuscular plane just above periosteum. This allowed the formation of a free nasalis muscle axial pedicle (based on the angular artery) which was still attached to the actual cutaneous flap, increasing its mobility and vascular viability. Hemostasis was obtained with pinpoint electrocoagulation. The flap was mobilized into position and the pivotal anchor points positioned and stabilized with buried interrupted sutures. Subcutaneous and dermal tissues were closed in a multilayered fashion with sutures. Tissue redundancies were excised, and the epidermal edges were apposed without significant tension and sutured with sutures. Orbicularis Oris Muscle Flap Text: The defect edges were debeveled with a #15 scalpel blade.  Given that the defect affected the competency of the oral sphincter an obicularis oris muscle flap was deemed most appropriate to restore this competency and normal muscle function.  Using a sterile surgical marker, an appropriate flap was drawn incorporating the defect. The area thus outlined was incised with a #15 scalpel blade. Melolabial Transposition Flap Text: The defect edges were debeveled with a #15 scalpel blade.  Given the location of the defect and the proximity to free margins a melolabial flap was deemed most appropriate.  Using a sterile surgical marker, an appropriate melolabial transposition flap was drawn incorporating the defect.    The area thus outlined was incised deep to adipose tissue with a #15 scalpel blade.  The skin margins were undermined to an appropriate distance in all directions utilizing iris scissors. Rhombic Flap Text: The defect edges were debeveled with a #15 scalpel blade.  Given the location of the defect and the proximity to free margins a rhombic flap was deemed most appropriate.  Using a sterile surgical marker, an appropriate rhombic flap was drawn incorporating the defect.    The area thus outlined was incised deep to adipose tissue with a #15 scalpel blade.  The skin margins were undermined to an appropriate distance in all directions utilizing iris scissors. Rhomboid Transposition Flap Text: The defect edges were debeveled with a #15 scalpel blade.  Given the location of the defect and the proximity to free margins a rhomboid transposition flap was deemed most appropriate.  Using a sterile surgical marker, an appropriate rhomboid flap was drawn incorporating the defect.    The area thus outlined was incised deep to adipose tissue with a #15 scalpel blade.  The skin margins were undermined to an appropriate distance in all directions utilizing iris scissors. Bi-Rhombic Flap Text: The defect edges were debeveled with a #15 scalpel blade.  Given the location of the defect and the proximity to free margins a bi-rhombic flap was deemed most appropriate.  Using a sterile surgical marker, an appropriate rhombic flap was drawn incorporating the defect. The area thus outlined was incised deep to adipose tissue with a #15 scalpel blade.  The skin margins were undermined to an appropriate distance in all directions utilizing iris scissors. Helical Rim Advancement Flap Text: The defect edges were debeveled with a #15 blade scalpel.  Given the location of the defect and the proximity to free margins (helical rim) a double helical rim advancement flap was deemed most appropriate.  Using a sterile surgical marker, the appropriate advancement flaps were drawn incorporating the defect and placing the expected incisions between the helical rim and antihelix where possible.  The area thus outlined was incised through and through with a #15 scalpel blade.  With a skin hook and iris scissors, the flaps were gently and sharply undermined and freed up. Bilateral Helical Rim Advancement Flap Text: The defect edges were debeveled with a #15 blade scalpel.  Given the location of the defect and the proximity to free margins (helical rim) a bilateral helical rim advancement flap was deemed most appropriate.  Using a sterile surgical marker, the appropriate advancement flaps were drawn incorporating the defect and placing the expected incisions between the helical rim and antihelix where possible.  The area thus outlined was incised through and through with a #15 scalpel blade.  With a skin hook and iris scissors, the flaps were gently and sharply undermined and freed up. Ear Star Wedge Flap Text: The defect edges were debeveled with a #15 blade scalpel.  Given the location of the defect and the proximity to free margins (helical rim) an ear star wedge flap was deemed most appropriate.  Using a sterile surgical marker, the appropriate flap was drawn incorporating the defect and placing the expected incisions between the helical rim and antihelix where possible.  The area thus outlined was incised through and through with a #15 scalpel blade. Banner Transposition Flap Text: The defect edges were debeveled with a #15 scalpel blade.  Given the location of the defect and the proximity to free margins a Banner transposition flap was deemed most appropriate.  Using a sterile surgical marker, an appropriate flap drawn around the defect. The area thus outlined was incised deep to adipose tissue with a #15 scalpel blade.  The skin margins were undermined to an appropriate distance in all directions utilizing iris scissors. Bilobed Flap Text: The defect edges were debeveled with a #15 scalpel blade.  Given the location of the defect and the proximity to free margins a bilobe flap was deemed most appropriate.  Using a sterile surgical marker, an appropriate bilobe flap drawn around the defect.    The area thus outlined was incised deep to adipose tissue with a #15 scalpel blade.  The skin margins were undermined to an appropriate distance in all directions utilizing iris scissors. Bilobed Transposition Flap Text: The defect edges were debeveled with a #15 scalpel blade.  Given the location of the defect and the proximity to free margins a bilobed transposition flap was deemed most appropriate.  Using a sterile surgical marker, an appropriate bilobe flap drawn around the defect.    The area thus outlined was incised deep to adipose tissue with a #15 scalpel blade.  The skin margins were undermined to an appropriate distance in all directions utilizing iris scissors. Trilobed Flap Text: The defect edges were debeveled with a #15 scalpel blade.  Given the location of the defect and the proximity to free margins a trilobed flap was deemed most appropriate.  Using a sterile surgical marker, an appropriate trilobed flap drawn around the defect.    The area thus outlined was incised deep to adipose tissue with a #15 scalpel blade.  The skin margins were undermined to an appropriate distance in all directions utilizing iris scissors. Dorsal Nasal Flap Text: The defect edges were debeveled with a #15 scalpel blade.  Given the location of the defect and the proximity to free margins a dorsal nasal flap was deemed most appropriate.  Using a sterile surgical marker, an appropriate dorsal nasal flap was drawn around the defect.    The area thus outlined was incised deep to adipose tissue with a #15 scalpel blade.  The skin margins were undermined to an appropriate distance in all directions utilizing iris scissors. Island Pedicle Flap Text: The defect edges were debeveled with a #15 scalpel blade.  Given the location of the defect, shape of the defect and the proximity to free margins an island pedicle advancement flap was deemed most appropriate.  Using a sterile surgical marker, an appropriate advancement flap was drawn incorporating the defect, outlining the appropriate donor tissue and placing the expected incisions within the relaxed skin tension lines where possible.    The area thus outlined was incised deep to adipose tissue with a #15 scalpel blade.  The skin margins were undermined to an appropriate distance in all directions around the primary defect and laterally outward around the island pedicle utilizing iris scissors.  There was minimal undermining beneath the pedicle flap. Island Pedicle Flap With Canthal Suspension Text: The defect edges were debeveled with a #15 scalpel blade.  Given the location of the defect, shape of the defect and the proximity to free margins an island pedicle advancement flap was deemed most appropriate.  Using a sterile surgical marker, an appropriate advancement flap was drawn incorporating the defect, outlining the appropriate donor tissue and placing the expected incisions within the relaxed skin tension lines where possible. The area thus outlined was incised deep to adipose tissue with a #15 scalpel blade.  The skin margins were undermined to an appropriate distance in all directions around the primary defect and laterally outward around the island pedicle utilizing iris scissors.  There was minimal undermining beneath the pedicle flap. A suspension suture was placed in the canthal tendon to prevent tension and prevent ectropion. Alar Island Pedicle Flap Text: The defect edges were debeveled with a #15 scalpel blade.  Given the location of the defect, shape of the defect and the proximity to the alar rim an island pedicle advancement flap was deemed most appropriate.  Using a sterile surgical marker, an appropriate advancement flap was drawn incorporating the defect, outlining the appropriate donor tissue and placing the expected incisions within the nasal ala running parallel to the alar rim. The area thus outlined was incised with a #15 scalpel blade.  The skin margins were undermined minimally to an appropriate distance in all directions around the primary defect and laterally outward around the island pedicle utilizing iris scissors.  There was minimal undermining beneath the pedicle flap. Double Island Pedicle Flap Text: The defect edges were debeveled with a #15 scalpel blade.  Given the location of the defect, shape of the defect and the proximity to free margins a double island pedicle advancement flap was deemed most appropriate.  Using a sterile surgical marker, an appropriate advancement flap was drawn incorporating the defect, outlining the appropriate donor tissue and placing the expected incisions within the relaxed skin tension lines where possible.    The area thus outlined was incised deep to adipose tissue with a #15 scalpel blade.  The skin margins were undermined to an appropriate distance in all directions around the primary defect and laterally outward around the island pedicle utilizing iris scissors.  There was minimal undermining beneath the pedicle flap. Island Pedicle Flap-Requiring Vessel Identification Text: The defect edges were debeveled with a #15 scalpel blade.  Given the location of the defect, shape of the defect and the proximity to free margins an island pedicle advancement flap was deemed most appropriate.  Using a sterile surgical marker, an appropriate advancement flap was drawn, based on the axial vessel mentioned above, incorporating the defect, outlining the appropriate donor tissue and placing the expected incisions within the relaxed skin tension lines where possible.    The area thus outlined was incised deep to adipose tissue with a #15 scalpel blade.  The skin margins were undermined to an appropriate distance in all directions around the primary defect and laterally outward around the island pedicle utilizing iris scissors.  There was minimal undermining beneath the pedicle flap. Keystone Flap Text: The defect edges were debeveled with a #15 scalpel blade.  Given the location of the defect, shape of the defect a keystone flap was deemed most appropriate.  Using a sterile surgical marker, an appropriate keystone flap was drawn incorporating the defect, outlining the appropriate donor tissue and placing the expected incisions within the relaxed skin tension lines where possible. The area thus outlined was incised deep to adipose tissue with a #15 scalpel blade.  The skin margins were undermined to an appropriate distance in all directions around the primary defect and laterally outward around the flap utilizing iris scissors. O-T Plasty Text: The defect edges were debeveled with a #15 scalpel blade.  Given the location of the defect, shape of the defect and the proximity to free margins an O-T plasty was deemed most appropriate.  Using a sterile surgical marker, an appropriate O-T plasty was drawn incorporating the defect and placing the expected incisions within the relaxed skin tension lines where possible.    The area thus outlined was incised deep to adipose tissue with a #15 scalpel blade.  The skin margins were undermined to an appropriate distance in all directions utilizing iris scissors. O-Z Plasty Text: The defect edges were debeveled with a #15 scalpel blade.  Given the location of the defect, shape of the defect and the proximity to free margins an O-Z plasty (double transposition flap) was deemed most appropriate.  Using a sterile surgical marker, the appropriate transposition flaps were drawn incorporating the defect and placing the expected incisions within the relaxed skin tension lines where possible.    The area thus outlined was incised deep to adipose tissue with a #15 scalpel blade.  The skin margins were undermined to an appropriate distance in all directions utilizing iris scissors.  Hemostasis was achieved with electrocautery.  The flaps were then transposed into place, one clockwise and the other counterclockwise, and anchored with interrupted buried subcutaneous sutures. Double O-Z Plasty Text: The defect edges were debeveled with a #15 scalpel blade.  Given the location of the defect, shape of the defect and the proximity to free margins a Double O-Z plasty (double transposition flap) was deemed most appropriate.  Using a sterile surgical marker, the appropriate transposition flaps were drawn incorporating the defect and placing the expected incisions within the relaxed skin tension lines where possible. The area thus outlined was incised deep to adipose tissue with a #15 scalpel blade.  The skin margins were undermined to an appropriate distance in all directions utilizing iris scissors.  Hemostasis was achieved with electrocautery.  The flaps were then transposed into place, one clockwise and the other counterclockwise, and anchored with interrupted buried subcutaneous sutures. V-Y Plasty Text: The defect edges were debeveled with a #15 scalpel blade.  Given the location of the defect, shape of the defect and the proximity to free margins an V-Y advancement flap was deemed most appropriate.  Using a sterile surgical marker, an appropriate advancement flap was drawn incorporating the defect and placing the expected incisions within the relaxed skin tension lines where possible.    The area thus outlined was incised deep to adipose tissue with a #15 scalpel blade.  The skin margins were undermined to an appropriate distance in all directions utilizing iris scissors. H Plasty Text: Given the location of the defect, shape of the defect and the proximity to free margins a H-plasty was deemed most appropriate for repair.  Using a sterile surgical marker, the appropriate advancement arms of the H-plasty were drawn incorporating the defect and placing the expected incisions within the relaxed skin tension lines where possible. The area thus outlined was incised deep to adipose tissue with a #15 scalpel blade. The skin margins were undermined to an appropriate distance in all directions utilizing iris scissors.  The opposing advancement arms were then advanced into place in opposite direction and anchored with interrupted buried subcutaneous sutures. W Plasty Text: The lesion was extirpated to the level of the fat with a #15 scalpel blade.  Given the location of the defect, shape of the defect and the proximity to free margins a W-plasty was deemed most appropriate for repair.  Using a sterile surgical marker, the appropriate transposition arms of the W-plasty were drawn incorporating the defect and placing the expected incisions within the relaxed skin tension lines where possible.    The area thus outlined was incised deep to adipose tissue with a #15 scalpel blade.  The skin margins were undermined to an appropriate distance in all directions utilizing iris scissors.  The opposing transposition arms were then transposed into place in opposite direction and anchored with interrupted buried subcutaneous sutures. Z Plasty Text: The lesion was extirpated to the level of the fat with a #15 scalpel blade.  Given the location of the defect, shape of the defect and the proximity to free margins a Z-plasty was deemed most appropriate for repair.  Using a sterile surgical marker, the appropriate transposition arms of the Z-plasty were drawn incorporating the defect and placing the expected incisions within the relaxed skin tension lines where possible.    The area thus outlined was incised deep to adipose tissue with a #15 scalpel blade.  The skin margins were undermined to an appropriate distance in all directions utilizing iris scissors.  The opposing transposition arms were then transposed into place in opposite direction and anchored with interrupted buried subcutaneous sutures. Zygomaticofacial Flap Text: Given the location of the defect, shape of the defect and the proximity to free margins a zygomaticofacial flap was deemed most appropriate for repair.  Using a sterile surgical marker, the appropriate flap was drawn incorporating the defect and placing the expected incisions within the relaxed skin tension lines where possible. The area thus outlined was incised deep to adipose tissue with a #15 scalpel blade with preservation of a vascular pedicle.  The skin margins were undermined to an appropriate distance in all directions utilizing iris scissors.  The flap was then placed into the defect and anchored with interrupted buried subcutaneous sutures. Cheek Interpolation Flap Text: A decision was made to reconstruct the defect utilizing an interpolation axial flap and a staged reconstruction.  A telfa template was made of the defect.  This telfa template was then used to outline the Cheek Interpolation flap.  The donor area for the pedicle flap was then injected with anesthesia.  The flap was excised through the skin and subcutaneous tissue down to the layer of the underlying musculature.  The interpolation flap was carefully excised within this deep plane to maintain its blood supply.  The edges of the donor site were undermined.   The donor site was closed in a primary fashion.  The pedicle was then rotated into position and sutured.  Once the tube was sutured into place, adequate blood supply was confirmed with blanching and refill.  The pedicle was then wrapped with xeroform gauze and dressed appropriately with a telfa and gauze bandage to ensure continued blood supply and protect the attached pedicle. Cheek-To-Nose Interpolation Flap Text: A decision was made to reconstruct the defect utilizing an interpolation axial flap and a staged reconstruction.  A telfa template was made of the defect.  This telfa template was then used to outline the Cheek-To-Nose Interpolation flap.  The donor area for the pedicle flap was then injected with anesthesia.  The flap was excised through the skin and subcutaneous tissue down to the layer of the underlying musculature.  The interpolation flap was carefully excised within this deep plane to maintain its blood supply.  The edges of the donor site were undermined.   The donor site was closed in a primary fashion.  The pedicle was then rotated into position and sutured.  Once the tube was sutured into place, adequate blood supply was confirmed with blanching and refill.  The pedicle was then wrapped with xeroform gauze and dressed appropriately with a telfa and gauze bandage to ensure continued blood supply and protect the attached pedicle. Interpolation Flap Text: A decision was made to reconstruct the defect utilizing an interpolation axial flap and a staged reconstruction.  A telfa template was made of the defect.  This telfa template was then used to outline the interpolation flap.  The donor area for the pedicle flap was then injected with anesthesia.  The flap was excised through the skin and subcutaneous tissue down to the layer of the underlying musculature.  The interpolation flap was carefully excised within this deep plane to maintain its blood supply.  The edges of the donor site were undermined.   The donor site was closed in a primary fashion.  The pedicle was then rotated into position and sutured.  Once the tube was sutured into place, adequate blood supply was confirmed with blanching and refill.  The pedicle was then wrapped with xeroform gauze and dressed appropriately with a telfa and gauze bandage to ensure continued blood supply and protect the attached pedicle. Melolabial Interpolation Flap Text: A decision was made to reconstruct the defect utilizing an interpolation axial flap and a staged reconstruction.  A telfa template was made of the defect.  This telfa template was then used to outline the melolabial interpolation flap.  The donor area for the pedicle flap was then injected with anesthesia.  The flap was excised through the skin and subcutaneous tissue down to the layer of the underlying musculature.  The pedicle flap was carefully excised within this deep plane to maintain its blood supply.  The edges of the donor site were undermined.   The donor site was closed in a primary fashion.  The pedicle was then rotated into position and sutured.  Once the tube was sutured into place, adequate blood supply was confirmed with blanching and refill.  The pedicle was then wrapped with xeroform gauze and dressed appropriately with a telfa and gauze bandage to ensure continued blood supply and protect the attached pedicle. Mastoid Interpolation Flap Text: A decision was made to reconstruct the defect utilizing an interpolation axial flap and a staged reconstruction.  A telfa template was made of the defect.  This telfa template was then used to outline the mastoid interpolation flap.  The donor area for the pedicle flap was then injected with anesthesia.  The flap was excised through the skin and subcutaneous tissue down to the layer of the underlying musculature.  The pedicle flap was carefully excised within this deep plane to maintain its blood supply.  The edges of the donor site were undermined.   The donor site was closed in a primary fashion.  The pedicle was then rotated into position and sutured.  Once the tube was sutured into place, adequate blood supply was confirmed with blanching and refill.  The pedicle was then wrapped with xeroform gauze and dressed appropriately with a telfa and gauze bandage to ensure continued blood supply and protect the attached pedicle. Posterior Auricular Interpolation Flap Text: A decision was made to reconstruct the defect utilizing an interpolation axial flap and a staged reconstruction.  A telfa template was made of the defect.  This telfa template was then used to outline the posterior auricular interpolation flap.  The donor area for the pedicle flap was then injected with anesthesia.  The flap was excised through the skin and subcutaneous tissue down to the layer of the underlying musculature.  The pedicle flap was carefully excised within this deep plane to maintain its blood supply.  The edges of the donor site were undermined.   The donor site was closed in a primary fashion.  The pedicle was then rotated into position and sutured.  Once the tube was sutured into place, adequate blood supply was confirmed with blanching and refill.  The pedicle was then wrapped with xeroform gauze and dressed appropriately with a telfa and gauze bandage to ensure continued blood supply and protect the attached pedicle. Paramedian Forehead Flap Text: A decision was made to reconstruct the defect utilizing an interpolation axial flap and a staged reconstruction.  A telfa template was made of the defect.  This telfa template was then used to outline the paramedian forehead pedicle flap.  The donor area for the pedicle flap was then injected with anesthesia.  The flap was excised through the skin and subcutaneous tissue down to the layer of the underlying musculature.  The pedicle flap was carefully excised within this deep plane to maintain its blood supply.  The edges of the donor site were undermined.   The donor site was closed in a primary fashion.  The pedicle was then rotated into position and sutured.  Once the tube was sutured into place, adequate blood supply was confirmed with blanching and refill.  The pedicle was then wrapped with xeroform gauze and dressed appropriately with a telfa and gauze bandage to ensure continued blood supply and protect the attached pedicle. Cheiloplasty (Less Than 50%) Text: A decision was made to reconstruct the defect with a  cheiloplasty.  The defect was undermined extensively.  Additional obicularis oris muscle was excised with a 15 blade scalpel.  The defect was converted into a full thickness wedge, of less than 50% of the vertical height of the lip, to facilite a better cosmetic result.  Small vessels were then tied off with 5-0 monocyrl. The obicularis oris, superficial fascia, adipose and dermis were then reapproximated.  After the deeper layers were approximated the epidermis was reapproximated with particular care given to realign the vermillion border. Cheiloplasty (Complex) Text: A decision was made to reconstruct the defect with a  cheiloplasty.  The defect was undermined extensively.  Additional obicularis oris muscle was excised with a 15 blade scalpel.  The defect was converted into a full thickness wedge to facilite a better cosmetic result.  Small vessels were then tied off with 5-0 monocyrl. The obicularis oris, superficial fascia, adipose and dermis were then reapproximated.  After the deeper layers were approximated the epidermis was reapproximated with particular care given to realign the vermillion border. Ear Wedge Repair Text: A wedge excision was completed by carrying down an excision through the full thickness of the ear and cartilage with an inward facing Burow's triangle. The wound was then closed in a layered fashion. Full Thickness Lip Wedge Repair (Flap) Text: Given the location of the defect and the proximity to free margins a full thickness wedge repair was deemed most appropriate.  Using a sterile surgical marker, the appropriate repair was drawn incorporating the defect and placing the expected incisions perpendicular to the vermilion border.  The vermilion border was also meticulously outlined to ensure appropriate reapproximation during the repair.  The area thus outlined was incised through and through with a #15 scalpel blade.  The muscularis and dermis were reaproximated with deep sutures following hemostasis. Care was taken to realign the vermilion border before proceeding with the superficial closure.  Once the vermilion was realigned the superfical and mucosal closure was finished. Ftsg Text: The defect edges were debeveled with a #15 scalpel blade.  Given the location of the defect, shape of the defect and the proximity to free margins a full thickness skin graft was deemed most appropriate.  Using a sterile surgical marker, the primary defect shape was transferred to the donor site. The area thus outlined was incised deep to adipose tissue with a #15 scalpel blade.  The harvested graft was then trimmed of adipose tissue until only dermis and epidermis was left.  The skin margins of the secondary defect were undermined to an appropriate distance in all directions utilizing iris scissors.  The secondary defect was closed with interrupted buried subcutaneous sutures.  The skin edges were then re-apposed with running  sutures.  The skin graft was then placed in the primary defect and oriented appropriately. Split-Thickness Skin Graft Text: The defect edges were debeveled with a #15 scalpel blade.  Given the location of the defect, shape of the defect and the proximity to free margins a split thickness skin graft was deemed most appropriate.  Using a sterile surgical marker, the primary defect shape was transferred to the donor site. The split thickness graft was then harvested.  The skin graft was then placed in the primary defect and oriented appropriately. Burow's Graft Text: The defect edges were debeveled with a #15 scalpel blade.  Given the location of the defect, shape of the defect, the proximity to free margins and the presence of a standing cone deformity a Burow's skin graft was deemed most appropriate. The standing cone was removed and this tissue was then trimmed to the shape of the primary defect. The adipose tissue was also removed until only dermis and epidermis were left.  The skin margins of the secondary defect were undermined to an appropriate distance in all directions utilizing iris scissors.  The secondary defect was closed with interrupted buried subcutaneous sutures.  The skin edges were then re-apposed with running  sutures.  The skin graft was then placed in the primary defect and oriented appropriately. Cartilage Graft Text: The defect edges were debeveled with a #15 scalpel blade.  Given the location of the defect, shape of the defect, the fact the defect involved a full thickness cartilage defect a cartilage graft was deemed most appropriate.  An appropriate donor site was identified, cleansed, and anesthetized. The cartilage graft was then harvested and transferred to the recipient site, oriented appropriately and then sutured into place.  The secondary defect was then repaired using a primary closure. Composite Graft Text: The defect edges were debeveled with a #15 scalpel blade.  Given the location of the defect, shape of the defect, the proximity to free margins and the fact the defect was full thickness a composite graft was deemed most appropriate.  The defect was outline and then transferred to the donor site.  A full thickness graft was then excised from the donor site. The graft was then placed in the primary defect, oriented appropriately and then sutured into place.  The secondary defect was then repaired using a primary closure. Epidermal Autograft Text: The defect edges were debeveled with a #15 scalpel blade.  Given the location of the defect, shape of the defect and the proximity to free margins an epidermal autograft was deemed most appropriate.  Using a sterile surgical marker, the primary defect shape was transferred to the donor site. The epidermal graft was then harvested.  The skin graft was then placed in the primary defect and oriented appropriately. Dermal Autograft Text: The defect edges were debeveled with a #15 scalpel blade.  Given the location of the defect, shape of the defect and the proximity to free margins a dermal autograft was deemed most appropriate.  Using a sterile surgical marker, the primary defect shape was transferred to the donor site. The area thus outlined was incised deep to adipose tissue with a #15 scalpel blade.  The harvested graft was then trimmed of adipose and epidermal tissue until only dermis was left.  The skin graft was then placed in the primary defect and oriented appropriately. Skin Substitute Text: The defect edges were debeveled with a #15 scalpel blade.  Given the location of the defect, shape of the defect and the proximity to free margins a skin substitute graft was deemed most appropriate.  The graft material was trimmed to fit the size of the defect. The graft was then placed in the primary defect and oriented appropriately. Tissue Cultured Epidermal Autograft Text: The defect edges were debeveled with a #15 scalpel blade.  Given the location of the defect, shape of the defect and the proximity to free margins a tissue cultured epidermal autograft was deemed most appropriate.  The graft was then trimmed to fit the size of the defect.  The graft was then placed in the primary defect and oriented appropriately. Xenograft Text: The defect edges were debeveled with a #15 scalpel blade.  Given the location of the defect, shape of the defect and the proximity to free margins a xenograft was deemed most appropriate.  The graft was then trimmed to fit the size of the defect.  The graft was then placed in the primary defect and oriented appropriately. Purse String (Simple) Text: Given the location of the defect and the characteristics of the surrounding skin a pursestring closure was deemed most appropriate.  Undermining was performed circumfirentially around the surgical defect.  A purstring suture was then placed and tightened. Purse String (Intermediate) Text: Given the location of the defect and the characteristics of the surrounding skin a pursestring intermediate closure was deemed most appropriate.  Undermining was performed circumfirentially around the surgical defect.  A purstring suture was then placed and tightened. Partial Purse String (Simple) Text: Given the location of the defect and the characteristics of the surrounding skin a simple purse string closure was deemed most appropriate.  Undermining was performed circumfirentially around the surgical defect.  A purse string suture was then placed and tightened. Wound tension only allowed a partial closure of the circular defect. Partial Purse String (Intermediate) Text: Given the location of the defect and the characteristics of the surrounding skin an intermediate purse string closure was deemed most appropriate.  Undermining was performed circumfirentially around the surgical defect.  A purse string suture was then placed and tightened. Wound tension only allowed a partial closure of the circular defect. Localized Dermabrasion Text: The patient was draped in routine manner.  Localized dermabrasion using 3 x 17 mm wire brush was performed in routine manner to papillary dermis. This spot dermabrasion is being performed to complete skin cancer reconstruction. It also will eliminate the other sun damaged precancerous cells that are known to be part of the regional effect of a lifetime's worth of sun exposure. This localized dermabrasion is therapeutic and should not be considered cosmetic in any regard. Tarsorrhaphy Text: A tarsorrhaphy was performed using Frost sutures. Complex Repair And Flap Additional Text (Will Appearing After The Standard Complex Repair Text): The complex repair was not sufficient to completely close the primary defect. The remaining additional defect was repaired with the flap mentioned below. Complex Repair And Graft Additional Text (Will Appearing After The Standard Complex Repair Text): The complex repair was not sufficient to completely close the primary defect. The remaining additional defect was repaired with the graft mentioned below. Unique Flap 1 Name: Tunneled Island Pedicle Flap Unique Flap 1 Text: The surgical defect and surrounding skin were prepped with antiseptic solution. The choice of the repair was performed because extensive undermining required, because there is insufficient tissue mobility to close the wound with local tissue, to avoid a deforming, depressed, and contracted scar, to avoid anatomic distortion and/or functional deficit in adjacent fixed structures, to avoid disruption to anatomic adjacent free margins, to reduce subcutaneous dead space to reduce risk of hematoma, to reduce tension to skin to allow closure, to reduce tension to reduce risk of skin necrosis, infection, and wound dehiscence, and to reduce tension to enhance both functional and cosmetic results. The defect edges were debeveled with a #15 scalpel blade.  Given the location of the defect, shape of the defect and the proximity to free margins a one-staged tunneled  island pedicle advancement flap was deemed most appropriate (Axial Flap based on the postauricular artery).  Using a sterile surgical marker, an appropriate island pedicle flap was drawn incorporating the defect, outlining the appropriate donor tissue and placing the expected incisions within the relaxed skin tension lines where possible.    The area thus outlined was incised deep to muscle with a #15 scalpel blade.  The skin margins were undermined to an appropriate distance in all directions around the primary defect and laterally outward around the island pedicle utilizing iris scissors.  There was minimal undermining beneath the pedicle flap. A window through the cartilage was created with a #15 blade, and a tunnel was created between the defect and the donor site. The flap was tunneled through to the anterior portion of the ear and sutured into place. The subcutaneous tissue and dermis were closed with 5-0 Vicryl.  Epidermal closure was achieved with 6-0 Ethilon (running).  The donor site was closed with 5-0 Vicryl. During the repair hemostasis was achieved with Electrocautery.  Vaseline + pressure dressing with telfa were applied. I reviewed with the patient in detail post-care instructions. Patient is not to engage in any heavy lifting, exercise, or swimming for the next 7 days. Should the patient develop any fevers, chills, bleeding, severe pain patient will contact the office immediately. Suture removal in 7 days. Manual Repair Warning Statement: We plan on removing the manually selected variable below in favor of our much easier automatic structured text blocks found in the previous tab. We decided to do this to help make the flow better and give you the full power of structured data. Manual selection is never going to be ideal in our platform and I would encourage you to avoid using manual selection from this point on, especially since I will be sunsetting this feature. It is important that you do one of two things with the customized text below. First, you can save all of the text in a word file so you can have it for future reference. Second, transfer the text to the appropriate area in the Library tab. Lastly, if there is a flap or graft type which we do not have you need to let us know right away so I can add it in before the variable is hidden. No need to panic, we plan to give you roughly 6 months to make the change. Same Histology In Subsequent Stages Text: The pattern and morphology of the tumor is as described in the first stage. No Residual Tumor Seen Histology Text: There were no malignant cells seen in the sections examined. Inflammation Suggestive Of Cancer Camouflage Histology Text: There was a dense lymphocytic infiltrate which prevented adequate histologic evaluation of adjacent structures. Incidental Superficial Basal Cell Carcinoma Histology Text: Incidental Islands of basaloid cells with a peripheral palisade and surrounding retraction artifact. Incidental Actinic Keratosis Histology Text: Alternating pink, blue parakeratosis with some atypical keratinocytes Missing Epidermis Histology Text: Area of missing epidermis that was not able to be retrieved on the initial stage. A second layer of tissue was removed and processed as a continuation of the previous stage. Bcc Histology Text: Islands of basaloid cells with a peripheral palisade and surrounding retraction artifact. Bcc Infiltrative Histology Text: Strands of basaloid cells penetrating deeply among the collagen fascicles and demonstrating an infiltrative pattern. Bcc  Morpheaform/Sclerosing Histology Text: Strands of basaloid cells penetrating deeply among the collagen fascicles with an increased number of fibroblasts and the presence of fibrotic desmoplastic storms. Bcc  Nodulocystic Histology Text: Islands of basaloid cells with a peripheral palisade and surrounding retraction artifact, in addition to dilated cystic spaces within some of these nodules due to necrosis or mucin formation. Bcc Pigmented Histology Text: Islands of basaloid cells with a peripheral palisade and surrounding retraction artifact with associated melanin and melanocytes within the tumor. Bcc Superficial Histology Text: Islands of basaloid cells with a peripheral palisade and surrounding retraction artifact in close contact with the papillary dermis. Bcc Superficial Pigmented Histology Text: Islands of basaloid cells with a peripheral palisade and surrounding retraction artifact in close contact with the papillary dermis and increased melanin and melanocytes within the tumor. Mixed Superficial And Nodular Bcc Histology Text: Islands of basaloid cells with a peripheral palisade and surrounding retraction artifact in close contact with the papillary dermis, as well as basaloid nodules with peripheral palisading and retraction artifact in the dermis. Mixed Nodular And Infiltrative Bcc Histology Text: Solid basaloid tumor islands with peripheral palisading and retraction artifact intermixed with strands of basaloid cells penetrating deeply among the collagen fascicles and demonstrating an infiltrative pattern. Metatypical Bcc Histology Text: Islands of basaloid cells with a peripheral palisade and surrounding retraction artifact in close contact with the papillary dermis intermixed with islands of atypical keratinocytes present in the underlying dermis. Fibroepithelioma Of Pinkus Histology Text: Pink strands of squamous epithelium with blue basaloid buds embedded in a fibromyxoid storms. Scc Histology Text: Epidermis of variable thickness with disorganization and atypia of the basal and lower spinous layer keratinocytes with areas of full thickness atypia with buds of similar atypical keratinocytes extending into the underlying dermis. Islands of similar atypical keratinocytes are present in the underlying dermis. Scc Well Differentiated Histology Text: Glassy keratinocytes with some areas of full thickness squamous atypia and increased squamous eddies and keratin pearls. Buds of similar atypical keratinocytes also extend into the underlying dermis. Scc Moderately Differentiated Histology Text: Glassy keratinocytes with more abundant areas of full thickness squamous atypia and increased squamous eddies and keratin pearls. Buds of similar atypical keratinocytes also extend into the underlying dermis. Scc Poorly Differentiated Histology Text: Some glassy keratinocytes with areas of severe full thickness squamous atypia and fewer squamous eddies and keratin pearls. Buds of similar severely atypical keratinocytes also extend into the underlying dermis. Scc Ka Subtype Histology Text: Glassy islands of atypical squamous epithelium with neutrophilic microabcesses, eosinophils and elastic trapping. Scc Spindle Histology Text: Glassy keratinocytes with areas of full thickness squamous atypia, prominent spindle cells with buds of similar atypical spindled keratinocytes  extending into the underlying dermis. Scc In Situ Histology Text: Parakeratosis overlying an acanthotic epidermis with full thickness disorganization and atypia of the keratinocytes, and marked solar elastosis of the underlying dermis. Merkel Cell Carcinoma Histology Text: Dermal sheets and nests of cells with hyperchromatic nuclei, a high mitotic rate, and scant cytoplasm. Afx Histology Text: Spindle cell neoplasm with atypical spindle cells throughout the dermis. Basosquamous Cell Carcinoma Histology Text: Epidermis of variable thickness with disorganization and atypia of the basal and lower spinous layer keratinocytes with areas of full thickness atypia with buds of similar atypical keratinocytes extending into the underlying dermis with islands of basaloid epithelium with peripheral palisading and retraction artifact. Dfsp Histology Text: Storiform spindle cell proliferation with infiltration into the subcutaneous tissue. Desmoplastic Trichoepithelioma Histology Text: Orthokeratosis overlying an atrophic epidermis with the dermis containing a dense sclerotic stroma containing small, thin, basaloid islands of cells without retraction and rare horn cysts and calcifications. Extramammary Paget's Disease Histology Text: Atypical cells throughout the epidermis, focally crushing a normal basal layer. Microcystic Adnexal Carcinoma Histology Text: Multiple basaloid strands with small lumina and horn cysts Sebaceous Carcinoma Histology Text: Nodule of undifferentiated cells with areas of necrosis, many apoptotic cells and scattered mitotic figures with several areas of the tumor demonstrating sebaceous differentiation. Mart-1 - Positive Histology Text: MART-1 staining demonstrates areas of higher density and clustering of melanocytes with Pagetoid spread upwards within the epidermis. The surgical margins are positive for tumor cells. Mart-1 - Negative Histology Text: MART-1 staining demonstrates a normal density and pattern of melanocytes along the dermal-epidermal junction. The surgical margins are negative for tumor cells. Information: Selecting Yes will display possible errors in your note based on the variables you have selected. This validation is only offered as a suggestion for you. PLEASE NOTE THAT THE VALIDATION TEXT WILL BE REMOVED WHEN YOU FINALIZE YOUR NOTE. IF YOU WANT TO FAX A PRELIMINARY NOTE YOU WILL NEED TO TOGGLE THIS TO 'NO' IF YOU DO NOT WANT IT IN YOUR FAXED NOTE.

## 2023-06-05 NOTE — PROGRESS NOTE ADULT - ASSESSMENT
[Time Spent: ___ minutes] : I have spent [unfilled] minutes of time on the encounter. 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